# Patient Record
Sex: FEMALE | Race: OTHER | HISPANIC OR LATINO | URBAN - METROPOLITAN AREA
[De-identification: names, ages, dates, MRNs, and addresses within clinical notes are randomized per-mention and may not be internally consistent; named-entity substitution may affect disease eponyms.]

---

## 2017-01-04 ENCOUNTER — EMERGENCY (EMERGENCY)
Facility: HOSPITAL | Age: 42
LOS: 1 days | Discharge: HOME | End: 2017-01-04
Attending: EMERGENCY MEDICINE
Payer: MEDICAID

## 2017-01-04 VITALS
DIASTOLIC BLOOD PRESSURE: 70 MMHG | WEIGHT: 173.06 LBS | HEIGHT: 63 IN | OXYGEN SATURATION: 100 % | HEART RATE: 85 BPM | SYSTOLIC BLOOD PRESSURE: 95 MMHG | RESPIRATION RATE: 20 BRPM | TEMPERATURE: 99 F

## 2017-01-04 DIAGNOSIS — Z90.721 ACQUIRED ABSENCE OF OVARIES, UNILATERAL: Chronic | ICD-10-CM

## 2017-01-04 DIAGNOSIS — R20.0 ANESTHESIA OF SKIN: ICD-10-CM

## 2017-01-04 DIAGNOSIS — M54.5 LOW BACK PAIN: ICD-10-CM

## 2017-01-04 DIAGNOSIS — J45.909 UNSPECIFIED ASTHMA, UNCOMPLICATED: ICD-10-CM

## 2017-01-04 PROCEDURE — 99282 EMERGENCY DEPT VISIT SF MDM: CPT

## 2017-01-04 PROCEDURE — 99283 EMERGENCY DEPT VISIT LOW MDM: CPT

## 2017-01-04 NOTE — ED STATDOCS - OBJECTIVE STATEMENT
42 y/o F pt with hx of herniated disc  presents to ED c/o back pain radiating to her legs. Pt states b/l leg tingling. Pt was given a shot in her back by Dr. Stark today and now pain has worsen. No saddle anesthesia, no urinary symptoms. No further complaints at this time.

## 2017-01-04 NOTE — ED ADULT TRIAGE NOTE - CHIEF COMPLAINT QUOTE
pt reports back pain, given "needle" by doctor 5:30pm, now with pain radiating down legs and causing difficulty walking. unsure of name of injection.

## 2017-01-04 NOTE — ED STATDOCS - NS ED MD SCRIBE ATTENDING SCRIBE SECTIONS
DISPOSITION/HIV/HISTORY OF PRESENT ILLNESS/VITAL SIGNS( Pullset)/REVIEW OF SYSTEMS/PHYSICAL EXAM/PAST MEDICAL/SURGICAL/SOCIAL HISTORY

## 2017-03-02 ENCOUNTER — RESULT REVIEW (OUTPATIENT)
Age: 42
End: 2017-03-02

## 2017-03-03 ENCOUNTER — OUTPATIENT (OUTPATIENT)
Dept: OUTPATIENT SERVICES | Facility: HOSPITAL | Age: 42
LOS: 1 days | End: 2017-03-03
Payer: MEDICAID

## 2017-03-03 DIAGNOSIS — D34 BENIGN NEOPLASM OF THYROID GLAND: ICD-10-CM

## 2017-03-03 DIAGNOSIS — Z90.721 ACQUIRED ABSENCE OF OVARIES, UNILATERAL: Chronic | ICD-10-CM

## 2017-03-03 LAB — OBSTETRICS AND GYNECOLOGY HOSPITAL CONSULT NOTE: SIGNIFICANT CHANGE UP

## 2017-03-03 PROCEDURE — 88173 CYTOPATH EVAL FNA REPORT: CPT | Mod: 26

## 2017-03-03 PROCEDURE — 88173 CYTOPATH EVAL FNA REPORT: CPT

## 2017-03-09 ENCOUNTER — OUTPATIENT (OUTPATIENT)
Dept: OUTPATIENT SERVICES | Facility: HOSPITAL | Age: 42
LOS: 1 days | End: 2017-03-09
Payer: MEDICAID

## 2017-03-09 VITALS
RESPIRATION RATE: 16 BRPM | TEMPERATURE: 99 F | DIASTOLIC BLOOD PRESSURE: 60 MMHG | HEART RATE: 64 BPM | SYSTOLIC BLOOD PRESSURE: 100 MMHG | WEIGHT: 165.35 LBS | HEIGHT: 63 IN

## 2017-03-09 DIAGNOSIS — Z90.721 ACQUIRED ABSENCE OF OVARIES, UNILATERAL: Chronic | ICD-10-CM

## 2017-03-09 DIAGNOSIS — E04.1 NONTOXIC SINGLE THYROID NODULE: ICD-10-CM

## 2017-03-09 DIAGNOSIS — Z30.8 ENCOUNTER FOR OTHER CONTRACEPTIVE MANAGEMENT: Chronic | ICD-10-CM

## 2017-03-09 DIAGNOSIS — Z01.818 ENCOUNTER FOR OTHER PREPROCEDURAL EXAMINATION: ICD-10-CM

## 2017-03-09 DIAGNOSIS — Z90.89 ACQUIRED ABSENCE OF OTHER ORGANS: Chronic | ICD-10-CM

## 2017-03-09 LAB
ALBUMIN SERPL ELPH-MCNC: 4.3 G/DL — SIGNIFICANT CHANGE UP (ref 3.3–5.2)
ALP SERPL-CCNC: 84 U/L — SIGNIFICANT CHANGE UP (ref 40–120)
ALT FLD-CCNC: 7 U/L — SIGNIFICANT CHANGE UP
ANION GAP SERPL CALC-SCNC: 16 MMOL/L — SIGNIFICANT CHANGE UP (ref 5–17)
APPEARANCE UR: CLEAR — SIGNIFICANT CHANGE UP
APTT BLD: 33.2 SEC — SIGNIFICANT CHANGE UP (ref 27.5–37.4)
AST SERPL-CCNC: 15 U/L — SIGNIFICANT CHANGE UP
BACTERIA # UR AUTO: ABNORMAL
BASOPHILS # BLD AUTO: 0 K/UL — SIGNIFICANT CHANGE UP (ref 0–0.2)
BASOPHILS NFR BLD AUTO: 0.2 % — SIGNIFICANT CHANGE UP (ref 0–2)
BILIRUB SERPL-MCNC: 0.2 MG/DL — LOW (ref 0.4–2)
BILIRUB UR-MCNC: NEGATIVE — SIGNIFICANT CHANGE UP
BLD GP AB SCN SERPL QL: SIGNIFICANT CHANGE UP
BUN SERPL-MCNC: 10 MG/DL — SIGNIFICANT CHANGE UP (ref 8–20)
CALCIUM SERPL-MCNC: 9.4 MG/DL — SIGNIFICANT CHANGE UP (ref 8.6–10.2)
CHLORIDE SERPL-SCNC: 103 MMOL/L — SIGNIFICANT CHANGE UP (ref 98–107)
CO2 SERPL-SCNC: 23 MMOL/L — SIGNIFICANT CHANGE UP (ref 22–29)
COLOR SPEC: YELLOW — SIGNIFICANT CHANGE UP
CREAT SERPL-MCNC: 0.71 MG/DL — SIGNIFICANT CHANGE UP (ref 0.5–1.3)
DIFF PNL FLD: ABNORMAL
EPI CELLS # UR: SIGNIFICANT CHANGE UP
GLUCOSE SERPL-MCNC: 94 MG/DL — SIGNIFICANT CHANGE UP (ref 70–115)
GLUCOSE UR QL: NEGATIVE MG/DL — SIGNIFICANT CHANGE UP
HCT VFR BLD CALC: 39.2 % — SIGNIFICANT CHANGE UP (ref 37–47)
HGB BLD-MCNC: 13.5 G/DL — SIGNIFICANT CHANGE UP (ref 12–16)
INR BLD: 1.01 RATIO — SIGNIFICANT CHANGE UP (ref 0.88–1.16)
KETONES UR-MCNC: NEGATIVE — SIGNIFICANT CHANGE UP
LEUKOCYTE ESTERASE UR-ACNC: ABNORMAL
LYMPHOCYTES # BLD AUTO: 2.9 K/UL — SIGNIFICANT CHANGE UP (ref 1–4.8)
LYMPHOCYTES # BLD AUTO: 44.8 % — SIGNIFICANT CHANGE UP (ref 20–55)
MCHC RBC-ENTMCNC: 33.1 PG — HIGH (ref 27–31)
MCHC RBC-ENTMCNC: 34.4 G/DL — SIGNIFICANT CHANGE UP (ref 32–36)
MCV RBC AUTO: 96.1 FL — SIGNIFICANT CHANGE UP (ref 81–99)
MONOCYTES # BLD AUTO: 0.3 K/UL — SIGNIFICANT CHANGE UP (ref 0–0.8)
MONOCYTES NFR BLD AUTO: 5.1 % — SIGNIFICANT CHANGE UP (ref 3–10)
NEUTROPHILS # BLD AUTO: 3.2 K/UL — SIGNIFICANT CHANGE UP (ref 1.8–8)
NEUTROPHILS NFR BLD AUTO: 49.7 % — SIGNIFICANT CHANGE UP (ref 37–73)
NITRITE UR-MCNC: NEGATIVE — SIGNIFICANT CHANGE UP
PH UR: 5 — SIGNIFICANT CHANGE UP (ref 4.8–8)
PLATELET # BLD AUTO: 290 K/UL — SIGNIFICANT CHANGE UP (ref 150–400)
POTASSIUM SERPL-MCNC: 4.2 MMOL/L — SIGNIFICANT CHANGE UP (ref 3.5–5.3)
POTASSIUM SERPL-SCNC: 4.2 MMOL/L — SIGNIFICANT CHANGE UP (ref 3.5–5.3)
PROT SERPL-MCNC: 7.6 G/DL — SIGNIFICANT CHANGE UP (ref 6.6–8.7)
PROT UR-MCNC: NEGATIVE MG/DL — SIGNIFICANT CHANGE UP
PROTHROM AB SERPL-ACNC: 11.1 SEC — SIGNIFICANT CHANGE UP (ref 10–13.1)
RBC # BLD: 4.08 M/UL — LOW (ref 4.4–5.2)
RBC # FLD: 13.3 % — SIGNIFICANT CHANGE UP (ref 11–15.6)
RBC CASTS # UR COMP ASSIST: SIGNIFICANT CHANGE UP /HPF (ref 0–4)
SODIUM SERPL-SCNC: 142 MMOL/L — SIGNIFICANT CHANGE UP (ref 135–145)
SP GR SPEC: 1.01 — SIGNIFICANT CHANGE UP (ref 1.01–1.02)
TYPE + AB SCN PNL BLD: SIGNIFICANT CHANGE UP
UROBILINOGEN FLD QL: NEGATIVE MG/DL — SIGNIFICANT CHANGE UP
WBC # BLD: 6.4 K/UL — SIGNIFICANT CHANGE UP (ref 4.8–10.8)
WBC # FLD AUTO: 6.4 K/UL — SIGNIFICANT CHANGE UP (ref 4.8–10.8)

## 2017-03-09 PROCEDURE — 87086 URINE CULTURE/COLONY COUNT: CPT

## 2017-03-09 PROCEDURE — 85730 THROMBOPLASTIN TIME PARTIAL: CPT

## 2017-03-09 PROCEDURE — 93010 ELECTROCARDIOGRAM REPORT: CPT

## 2017-03-09 PROCEDURE — 71046 X-RAY EXAM CHEST 2 VIEWS: CPT

## 2017-03-09 PROCEDURE — 80053 COMPREHEN METABOLIC PANEL: CPT

## 2017-03-09 PROCEDURE — 86900 BLOOD TYPING SEROLOGIC ABO: CPT

## 2017-03-09 PROCEDURE — 81001 URINALYSIS AUTO W/SCOPE: CPT

## 2017-03-09 PROCEDURE — 93005 ELECTROCARDIOGRAM TRACING: CPT

## 2017-03-09 PROCEDURE — 86850 RBC ANTIBODY SCREEN: CPT

## 2017-03-09 PROCEDURE — 85027 COMPLETE CBC AUTOMATED: CPT

## 2017-03-09 PROCEDURE — 86901 BLOOD TYPING SEROLOGIC RH(D): CPT

## 2017-03-09 PROCEDURE — 71020: CPT | Mod: 26

## 2017-03-09 PROCEDURE — 85610 PROTHROMBIN TIME: CPT

## 2017-03-09 RX ORDER — SODIUM CHLORIDE 9 MG/ML
3 INJECTION INTRAMUSCULAR; INTRAVENOUS; SUBCUTANEOUS ONCE
Qty: 0 | Refills: 0 | Status: DISCONTINUED | OUTPATIENT
Start: 2017-03-28 | End: 2017-03-28

## 2017-03-09 RX ORDER — SODIUM CHLORIDE 9 MG/ML
1000 INJECTION, SOLUTION INTRAVENOUS
Qty: 0 | Refills: 0 | Status: DISCONTINUED | OUTPATIENT
Start: 2017-03-28 | End: 2017-03-28

## 2017-03-09 NOTE — H&P PST ADULT - PMH
x 6  Asthma    Bipolar 1 disorder, depressed    Ectopic pregnancy  x 3  GERD (gastroesophageal reflux disease)    History of heart murmur in childhood    Lupus    Palpitations    Thyroid nodule  2016

## 2017-03-09 NOTE — H&P PST ADULT - NEGATIVE PSYCHIATRIC SYMPTOMS
no agitation/no visual hallucinations/no mood swings/no insomnia/no memory loss/no paranoia/no suicidal ideation

## 2017-03-09 NOTE — H&P PST ADULT - NEGATIVE FEMALE-SPECIFIC SYMPTOMS
no abnormal vaginal bleeding/no vaginal discharge/no amenorrhea/no pelvic pain/no dysmenorrhea/no spotting/no menorrhagia/no irregular menses

## 2017-03-09 NOTE — H&P PST ADULT - NEGATIVE SKIN SYMPTOMS
no brittle nails/no rash/no change in size/color of mole/no pitted nails/no dryness/no tumor/no itching/no hair loss

## 2017-03-09 NOTE — H&P PST ADULT - NEGATIVE ENMT SYMPTOMS
no dysphagia/no recurrent cold sores/no ear pain/no vertigo/no abnormal taste sensation/no gum bleeding/no sinus symptoms/no nasal obstruction/no post-nasal discharge/no nasal congestion/no tinnitus/no dry mouth/no nasal discharge/no nose bleeds/no throat pain/no hearing difficulty

## 2017-03-09 NOTE — H&P PST ADULT - NEGATIVE OPHTHALMOLOGIC SYMPTOMS
no diplopia/no scleral injection R/no photophobia/no pain L/no blurred vision R/no discharge L/no pain R/no loss of vision R/no loss of vision L/no discharge R/no irritation L/no irritation R/no blurred vision L/no scleral injection L/no lacrimation R/no lacrimation L

## 2017-03-09 NOTE — H&P PST ADULT - NEGATIVE BREAST SYMPTOMS
no breast lump R/no breast lump L/no breast tenderness R/no nipple discharge R/no breast tenderness L/no nipple discharge L

## 2017-03-09 NOTE — H&P PST ADULT - NEGATIVE GENERAL SYMPTOMS
no weight loss/no malaise/no fever/no fatigue/no polydipsia/no weight gain/no polyuria/no polyphagia/no anorexia/no chills/no sweating

## 2017-03-09 NOTE — H&P PST ADULT - NEGATIVE NEUROLOGICAL SYMPTOMS
no syncope/no hemiparesis/no confusion/no tremors/no loss of sensation/no headache/no weakness/no vertigo/no loss of consciousness/no transient paralysis/no focal seizures/no paresthesias/no facial palsy/no difficulty walking/no generalized seizures

## 2017-03-09 NOTE — H&P PST ADULT - GASTROINTESTINAL DETAILS
no organomegaly/soft/no distention/no masses palpable/nontender/no guarding/no rebound tenderness/bowel sounds normal/no rigidity/no bruit

## 2017-03-09 NOTE — H&P PST ADULT - NSANTHOSAYNRD_GEN_A_CORE
No. RUPERTO screening performed.  STOP BANG Legend: 0-2 = LOW Risk; 3-4 = INTERMEDIATE Risk; 5-8 = HIGH Risk

## 2017-03-09 NOTE — H&P PST ADULT - PROBLEM SELECTOR PLAN 1
Right thyroid lobectomy with isthmusectomy frozen section possible total thyroidectomy possible modified radical neck dissection

## 2017-03-09 NOTE — H&P PST ADULT - MUSCULOSKELETAL
details… detailed exam ROM intact/normal strength/no joint warmth/no joint erythema/no calf tenderness/no joint swelling

## 2017-03-09 NOTE — H&P PST ADULT - FAMILY HISTORY
Father  Still living? No  Family history of cancer in father, Age at diagnosis: Age Unknown     Mother  Still living? Yes, Estimated age: 71-80  Family history of hypothyroidism, Age at diagnosis: Age Unknown

## 2017-03-09 NOTE — H&P PST ADULT - NEUROLOGICAL DETAILS
superficial reflexes intact/normal strength/cranial nerves intact/sensation intact/alert and oriented x 3/responds to verbal commands/no spontaneous movement/responds to pain/deep reflexes intact

## 2017-03-09 NOTE — H&P PST ADULT - NEGATIVE MUSCULOSKELETAL SYMPTOMS
no back pain/no arm pain R/no myalgia/no arthralgia/no arm pain L/no neck pain/no stiffness/no joint swelling/no muscle cramps/no muscle weakness/no leg pain L/no arthritis/no leg pain R

## 2017-03-09 NOTE — H&P PST ADULT - NEGATIVE CARDIOVASCULAR SYMPTOMS
no peripheral edema/no paroxysmal nocturnal dyspnea/no orthopnea/no palpitations/no claudication/no chest pain/no dyspnea on exertion

## 2017-03-09 NOTE — H&P PST ADULT - NEGATIVE GENERAL GENITOURINARY SYMPTOMS
no gas in urine/no flank pain L/no urine discoloration/no bladder infections/no renal colic/no nocturia/no dysuria/no incontinence/normal urinary frequency/no hematuria/no flank pain R/no urinary hesitancy

## 2017-03-09 NOTE — H&P PST ADULT - NEGATIVE GASTROINTESTINAL SYMPTOMS
no melena/no flatulence/no diarrhea/no abdominal pain/no hematochezia/no vomiting/no change in bowel habits/no constipation

## 2017-03-09 NOTE — H&P PST ADULT - HISTORY OF PRESENT ILLNESS
40 y/o female had ct chest to evaluate enlarged thyroid results showed thyroid nodules patient now presents for 40 y/o female had ct chest to evaluate enlarged thyroid results showed thyroid nodules patient now presents for Right thyroid lobectomy with isthmusectomy frozen section possible total thyroidectomy possible modified radical neck dissection

## 2017-03-09 NOTE — H&P PST ADULT - RS GEN PE MLT RESP DETAILS PC
no rales/airway patent/no intercostal retractions/breath sounds equal/respirations non-labored/no subcutaneous emphysema/good air movement/clear to auscultation bilaterally/no chest wall tenderness/no rhonchi/no wheezes

## 2017-03-10 DIAGNOSIS — Z01.818 ENCOUNTER FOR OTHER PREPROCEDURAL EXAMINATION: ICD-10-CM

## 2017-03-10 DIAGNOSIS — E02 SUBCLINICAL IODINE-DEFICIENCY HYPOTHYROIDISM: ICD-10-CM

## 2017-03-10 LAB
CULTURE RESULTS: SIGNIFICANT CHANGE UP
SPECIMEN SOURCE: SIGNIFICANT CHANGE UP

## 2017-03-27 ENCOUNTER — RESULT REVIEW (OUTPATIENT)
Age: 42
End: 2017-03-27

## 2017-03-28 ENCOUNTER — INPATIENT (INPATIENT)
Facility: HOSPITAL | Age: 42
LOS: 0 days | Discharge: HOME | DRG: 627 | End: 2017-03-29
Attending: SPECIALIST | Admitting: SPECIALIST
Payer: MEDICAID

## 2017-03-28 VITALS
HEIGHT: 63 IN | TEMPERATURE: 98 F | WEIGHT: 164.91 LBS | OXYGEN SATURATION: 100 % | SYSTOLIC BLOOD PRESSURE: 100 MMHG | HEART RATE: 77 BPM | DIASTOLIC BLOOD PRESSURE: 61 MMHG | RESPIRATION RATE: 16 BRPM

## 2017-03-28 DIAGNOSIS — Z90.89 ACQUIRED ABSENCE OF OTHER ORGANS: Chronic | ICD-10-CM

## 2017-03-28 DIAGNOSIS — E02 SUBCLINICAL IODINE-DEFICIENCY HYPOTHYROIDISM: ICD-10-CM

## 2017-03-28 DIAGNOSIS — Z90.721 ACQUIRED ABSENCE OF OVARIES, UNILATERAL: Chronic | ICD-10-CM

## 2017-03-28 DIAGNOSIS — Z30.8 ENCOUNTER FOR OTHER CONTRACEPTIVE MANAGEMENT: Chronic | ICD-10-CM

## 2017-03-28 DIAGNOSIS — E04.1 NONTOXIC SINGLE THYROID NODULE: ICD-10-CM

## 2017-03-28 RX ORDER — TRAZODONE HCL 50 MG
50 TABLET ORAL DAILY
Qty: 0 | Refills: 0 | Status: DISCONTINUED | OUTPATIENT
Start: 2017-03-28 | End: 2017-03-29

## 2017-03-28 RX ORDER — ONDANSETRON 8 MG/1
4 TABLET, FILM COATED ORAL ONCE
Qty: 0 | Refills: 0 | Status: DISCONTINUED | OUTPATIENT
Start: 2017-03-28 | End: 2017-03-28

## 2017-03-28 RX ORDER — MORPHINE SULFATE 50 MG/1
4 CAPSULE, EXTENDED RELEASE ORAL
Qty: 0 | Refills: 0 | Status: DISCONTINUED | OUTPATIENT
Start: 2017-03-28 | End: 2017-03-29

## 2017-03-28 RX ORDER — ARIPIPRAZOLE 15 MG/1
15 TABLET ORAL DAILY
Qty: 0 | Refills: 0 | Status: DISCONTINUED | OUTPATIENT
Start: 2017-03-28 | End: 2017-03-29

## 2017-03-28 RX ORDER — SODIUM CHLORIDE 9 MG/ML
3 INJECTION INTRAMUSCULAR; INTRAVENOUS; SUBCUTANEOUS EVERY 8 HOURS
Qty: 0 | Refills: 0 | Status: DISCONTINUED | OUTPATIENT
Start: 2017-03-28 | End: 2017-03-29

## 2017-03-28 RX ORDER — CEFAZOLIN SODIUM 1 G
2000 VIAL (EA) INJECTION ONCE
Qty: 0 | Refills: 0 | Status: COMPLETED | OUTPATIENT
Start: 2017-03-28 | End: 2017-03-28

## 2017-03-28 RX ORDER — HYDROMORPHONE HYDROCHLORIDE 2 MG/ML
0.5 INJECTION INTRAMUSCULAR; INTRAVENOUS; SUBCUTANEOUS
Qty: 0 | Refills: 0 | Status: DISCONTINUED | OUTPATIENT
Start: 2017-03-28 | End: 2017-03-28

## 2017-03-28 RX ORDER — QUETIAPINE FUMARATE 200 MG/1
100 TABLET, FILM COATED ORAL DAILY
Qty: 0 | Refills: 0 | Status: DISCONTINUED | OUTPATIENT
Start: 2017-03-28 | End: 2017-03-29

## 2017-03-28 RX ORDER — ACETAMINOPHEN 500 MG
650 TABLET ORAL EVERY 6 HOURS
Qty: 0 | Refills: 0 | Status: DISCONTINUED | OUTPATIENT
Start: 2017-03-28 | End: 2017-03-29

## 2017-03-28 RX ORDER — FENTANYL CITRATE 50 UG/ML
50 INJECTION INTRAVENOUS
Qty: 0 | Refills: 0 | Status: DISCONTINUED | OUTPATIENT
Start: 2017-03-28 | End: 2017-03-28

## 2017-03-28 RX ORDER — SODIUM CHLORIDE 9 MG/ML
1000 INJECTION, SOLUTION INTRAVENOUS
Qty: 0 | Refills: 0 | Status: DISCONTINUED | OUTPATIENT
Start: 2017-03-28 | End: 2017-03-28

## 2017-03-28 RX ORDER — DIVALPROEX SODIUM 500 MG/1
500 TABLET, DELAYED RELEASE ORAL DAILY
Qty: 0 | Refills: 0 | Status: DISCONTINUED | OUTPATIENT
Start: 2017-03-28 | End: 2017-03-29

## 2017-03-28 RX ORDER — ALBUTEROL 90 UG/1
2 AEROSOL, METERED ORAL EVERY 6 HOURS
Qty: 0 | Refills: 0 | Status: DISCONTINUED | OUTPATIENT
Start: 2017-03-28 | End: 2017-03-29

## 2017-03-28 RX ORDER — TRAZODONE HCL 50 MG
100 TABLET ORAL ONCE
Qty: 0 | Refills: 0 | Status: COMPLETED | OUTPATIENT
Start: 2017-03-28 | End: 2017-03-28

## 2017-03-28 RX ADMIN — FENTANYL CITRATE 50 MICROGRAM(S): 50 INJECTION INTRAVENOUS at 11:55

## 2017-03-28 RX ADMIN — QUETIAPINE FUMARATE 100 MILLIGRAM(S): 200 TABLET, FILM COATED ORAL at 15:59

## 2017-03-28 RX ADMIN — SODIUM CHLORIDE 3 MILLILITER(S): 9 INJECTION INTRAMUSCULAR; INTRAVENOUS; SUBCUTANEOUS at 20:56

## 2017-03-28 RX ADMIN — HYDROMORPHONE HYDROCHLORIDE 0.5 MILLIGRAM(S): 2 INJECTION INTRAMUSCULAR; INTRAVENOUS; SUBCUTANEOUS at 14:15

## 2017-03-28 RX ADMIN — FENTANYL CITRATE 50 MICROGRAM(S): 50 INJECTION INTRAVENOUS at 11:40

## 2017-03-28 RX ADMIN — HYDROMORPHONE HYDROCHLORIDE 0.5 MILLIGRAM(S): 2 INJECTION INTRAMUSCULAR; INTRAVENOUS; SUBCUTANEOUS at 12:55

## 2017-03-28 RX ADMIN — ALBUTEROL 2 PUFF(S): 90 AEROSOL, METERED ORAL at 20:44

## 2017-03-28 RX ADMIN — ARIPIPRAZOLE 15 MILLIGRAM(S): 15 TABLET ORAL at 15:57

## 2017-03-28 RX ADMIN — HYDROMORPHONE HYDROCHLORIDE 0.5 MILLIGRAM(S): 2 INJECTION INTRAMUSCULAR; INTRAVENOUS; SUBCUTANEOUS at 12:42

## 2017-03-28 RX ADMIN — DIVALPROEX SODIUM 500 MILLIGRAM(S): 500 TABLET, DELAYED RELEASE ORAL at 16:14

## 2017-03-28 RX ADMIN — MORPHINE SULFATE 4 MILLIGRAM(S): 50 CAPSULE, EXTENDED RELEASE ORAL at 17:55

## 2017-03-28 RX ADMIN — HYDROMORPHONE HYDROCHLORIDE 0.5 MILLIGRAM(S): 2 INJECTION INTRAMUSCULAR; INTRAVENOUS; SUBCUTANEOUS at 14:00

## 2017-03-28 RX ADMIN — FENTANYL CITRATE 50 MICROGRAM(S): 50 INJECTION INTRAVENOUS at 11:30

## 2017-03-28 RX ADMIN — MORPHINE SULFATE 4 MILLIGRAM(S): 50 CAPSULE, EXTENDED RELEASE ORAL at 18:55

## 2017-03-28 RX ADMIN — Medication 100 MILLIGRAM(S): at 23:13

## 2017-03-28 RX ADMIN — Medication 50 MILLIGRAM(S): at 15:59

## 2017-03-28 RX ADMIN — FENTANYL CITRATE 50 MICROGRAM(S): 50 INJECTION INTRAVENOUS at 11:45

## 2017-03-29 ENCOUNTER — TRANSCRIPTION ENCOUNTER (OUTPATIENT)
Age: 42
End: 2017-03-29

## 2017-03-29 VITALS
SYSTOLIC BLOOD PRESSURE: 111 MMHG | DIASTOLIC BLOOD PRESSURE: 74 MMHG | RESPIRATION RATE: 18 BRPM | OXYGEN SATURATION: 96 % | HEART RATE: 75 BPM | TEMPERATURE: 99 F

## 2017-03-29 LAB
ANION GAP SERPL CALC-SCNC: 9 MMOL/L — SIGNIFICANT CHANGE UP (ref 5–17)
BUN SERPL-MCNC: 7 MG/DL — LOW (ref 8–20)
CALCIUM SERPL-MCNC: 8.3 MG/DL — LOW (ref 8.6–10.2)
CHLORIDE SERPL-SCNC: 104 MMOL/L — SIGNIFICANT CHANGE UP (ref 98–107)
CO2 SERPL-SCNC: 26 MMOL/L — SIGNIFICANT CHANGE UP (ref 22–29)
CREAT SERPL-MCNC: 0.69 MG/DL — SIGNIFICANT CHANGE UP (ref 0.5–1.3)
GLUCOSE SERPL-MCNC: 101 MG/DL — SIGNIFICANT CHANGE UP (ref 70–115)
HCT VFR BLD CALC: 35 % — LOW (ref 37–47)
HGB BLD-MCNC: 12.1 G/DL — SIGNIFICANT CHANGE UP (ref 12–16)
MCHC RBC-ENTMCNC: 32.9 PG — HIGH (ref 27–31)
MCHC RBC-ENTMCNC: 34.6 G/DL — SIGNIFICANT CHANGE UP (ref 32–36)
MCV RBC AUTO: 95.1 FL — SIGNIFICANT CHANGE UP (ref 81–99)
PLATELET # BLD AUTO: 214 K/UL — SIGNIFICANT CHANGE UP (ref 150–400)
POTASSIUM SERPL-MCNC: 4.1 MMOL/L — SIGNIFICANT CHANGE UP (ref 3.5–5.3)
POTASSIUM SERPL-SCNC: 4.1 MMOL/L — SIGNIFICANT CHANGE UP (ref 3.5–5.3)
RBC # BLD: 3.68 M/UL — LOW (ref 4.4–5.2)
RBC # FLD: 13.4 % — SIGNIFICANT CHANGE UP (ref 11–15.6)
SODIUM SERPL-SCNC: 139 MMOL/L — SIGNIFICANT CHANGE UP (ref 135–145)
SURGICAL PATHOLOGY FINAL REPORT - CH: SIGNIFICANT CHANGE UP
WBC # BLD: 8.8 K/UL — SIGNIFICANT CHANGE UP (ref 4.8–10.8)
WBC # FLD AUTO: 8.8 K/UL — SIGNIFICANT CHANGE UP (ref 4.8–10.8)

## 2017-03-29 PROCEDURE — 83735 ASSAY OF MAGNESIUM: CPT

## 2017-03-29 PROCEDURE — 82040 ASSAY OF SERUM ALBUMIN: CPT

## 2017-03-29 PROCEDURE — 85027 COMPLETE CBC AUTOMATED: CPT

## 2017-03-29 PROCEDURE — 94640 AIRWAY INHALATION TREATMENT: CPT

## 2017-03-29 PROCEDURE — 88331 PATH CONSLTJ SURG 1 BLK 1SPC: CPT

## 2017-03-29 PROCEDURE — 88307 TISSUE EXAM BY PATHOLOGIST: CPT

## 2017-03-29 PROCEDURE — 36415 COLL VENOUS BLD VENIPUNCTURE: CPT

## 2017-03-29 PROCEDURE — 80048 BASIC METABOLIC PNL TOTAL CA: CPT

## 2017-03-29 RX ORDER — SODIUM CHLORIDE 9 MG/ML
1000 INJECTION, SOLUTION INTRAVENOUS
Qty: 0 | Refills: 0 | Status: DISCONTINUED | OUTPATIENT
Start: 2017-03-29 | End: 2017-03-29

## 2017-03-29 RX ORDER — OXYCODONE HYDROCHLORIDE 5 MG/1
1 TABLET ORAL
Qty: 24 | Refills: 0
Start: 2017-03-29 | End: 2017-04-02

## 2017-03-29 RX ORDER — SODIUM CHLORIDE 9 MG/ML
1000 INJECTION INTRAMUSCULAR; INTRAVENOUS; SUBCUTANEOUS ONCE
Qty: 0 | Refills: 0 | Status: COMPLETED | OUTPATIENT
Start: 2017-03-29 | End: 2017-03-29

## 2017-03-29 RX ADMIN — ARIPIPRAZOLE 15 MILLIGRAM(S): 15 TABLET ORAL at 13:03

## 2017-03-29 RX ADMIN — QUETIAPINE FUMARATE 100 MILLIGRAM(S): 200 TABLET, FILM COATED ORAL at 13:02

## 2017-03-29 RX ADMIN — SODIUM CHLORIDE 3 MILLILITER(S): 9 INJECTION INTRAMUSCULAR; INTRAVENOUS; SUBCUTANEOUS at 04:31

## 2017-03-29 RX ADMIN — DIVALPROEX SODIUM 500 MILLIGRAM(S): 500 TABLET, DELAYED RELEASE ORAL at 13:02

## 2017-03-29 RX ADMIN — SODIUM CHLORIDE 2000 MILLILITER(S): 9 INJECTION INTRAMUSCULAR; INTRAVENOUS; SUBCUTANEOUS at 09:46

## 2017-03-29 RX ADMIN — Medication 50 MILLIGRAM(S): at 13:03

## 2017-03-29 RX ADMIN — SODIUM CHLORIDE 3 MILLILITER(S): 9 INJECTION INTRAMUSCULAR; INTRAVENOUS; SUBCUTANEOUS at 13:03

## 2017-03-29 NOTE — DISCHARGE NOTE ADULT - CARE PLAN
Principal Discharge DX:	Thyroid nodule  Goal:	pain control, outpatient follow up, normal hormone regulation  Instructions for follow-up, activity and diet:	avoid strenuous activity   advance diet as tolerated   may shower only, prevent drain area from getting wet, if it does pat dry, do not let water directl;y hit incision area, do not remove steri strips , pat area dry   monitor and record drain outpatient every 8 hours , empty as needed   follow up dr hopkins one week Principal Discharge DX:	Thyroid nodule  Goal:	pain control, outpatient follow up  Instructions for follow-up, activity and diet:	avoid strenuous activity   advance diet as tolerated   may shower only, prevent drain area from getting wet, if it does pat dry, do not let water directl;y hit incision area, do not remove steri strips , pat area dry   monitor and record drain outpatient every 12 hours , empty as needed   follow up dr hopkins FRIDAY in office. call for appointment immediately

## 2017-03-29 NOTE — DISCHARGE NOTE ADULT - PLAN OF CARE
pain control, outpatient follow up, normal hormone regulation avoid strenuous activity   advance diet as tolerated   may shower only, prevent drain area from getting wet, if it does pat dry, do not let water directl;y hit incision area, do not remove steri strips , pat area dry   monitor and record drain outpatient every 8 hours , empty as needed   follow up dr hopkins one week pain control, outpatient follow up avoid strenuous activity   advance diet as tolerated   may shower only, prevent drain area from getting wet, if it does pat dry, do not let water directl;y hit incision area, do not remove steri strips , pat area dry   monitor and record drain outpatient every 12 hours , empty as needed   follow up dr hopkins FRIDAY in office. call for appointment immediately

## 2017-03-29 NOTE — DISCHARGE NOTE ADULT - MEDICATION SUMMARY - MEDICATIONS TO TAKE
I will START or STAY ON the medications listed below when I get home from the hospital:    acetaminophen-oxyCODONE 325 mg-5 mg oral tablet  -- 1 tab(s) by mouth every 4 hours, As needed, Moderate Pain MDD:6  -- Indication: For Moderate PAin     Depakote 500 mg oral delayed release tablet  -- 1000  by mouth once a day (at bedtime)  -- Indication: For Home med    traZODone 150 mg oral tablet  -- 1 tab(s) by mouth once a day (at bedtime)  -- Indication: For Home med    ARIPiprazole 15 mg oral tablet  -- 1 tab(s) by mouth once a day  -- Indication: For Home med     SEROquel 100 mg oral tablet  -- 1 tab(s) by mouth once a day  -- Indication: For HOme med     potassium iodide 1 g/mL oral solution  -- 5 drop(s) by mouth 3 times a day  -- Indication: For Home med     Ventolin HFA 90 mcg/inh inhalation aerosol  -- 2 puff(s) inhaled 4 times a day, As Needed  -- Indication: For Home med

## 2017-03-29 NOTE — DISCHARGE NOTE ADULT - CARE PROVIDER_API CALL
Abisai Eubanks), Surgery  [address]  [place] [address]  Phone: [phone]  Fax: [phone]

## 2017-03-29 NOTE — DISCHARGE NOTE ADULT - PATIENT PORTAL LINK FT
“You can access the FollowHealth Patient Portal, offered by [facility], by registering with the following website: [email or web address]”

## 2017-03-29 NOTE — DISCHARGE NOTE ADULT - HOSPITAL COURSE
42F s/p hemithryoidectomy on 3/28 no complicattions, admitted to surgical floor post op remained stable no acute events. KELIN output recorded post op. Given dvt ppx  During stay tolerating diet ambulating and voiding. pain well controlled on oral meds. no issues. Plan for d/c with outpatient follow up in officr with dr hopkins 42F s/p hemithryoidectomy on 3/28 no complicattions, admitted to surgical floor post op remained stable no acute events. KELIN output recorded post op. Given dvt ppx  During stay tolerating diet ambulating and voiding. pain well controlled on oral meds. no issues. Plan for d/c with outpatient follow up in officr with dr hopkins     Ca++ 8.3 this am  voice good  drain 55cc, will leave in place  fu kellie fri am

## 2017-06-22 ENCOUNTER — EMERGENCY (EMERGENCY)
Facility: HOSPITAL | Age: 42
LOS: 1 days | Discharge: LEFT BEFORE DISCHARGE | End: 2017-06-22
Attending: EMERGENCY MEDICINE

## 2017-06-22 VITALS
TEMPERATURE: 98 F | HEART RATE: 90 BPM | RESPIRATION RATE: 20 BRPM | HEIGHT: 66 IN | DIASTOLIC BLOOD PRESSURE: 77 MMHG | WEIGHT: 186.07 LBS | OXYGEN SATURATION: 99 % | SYSTOLIC BLOOD PRESSURE: 112 MMHG

## 2017-06-22 VITALS
RESPIRATION RATE: 20 BRPM | SYSTOLIC BLOOD PRESSURE: 112 MMHG | OXYGEN SATURATION: 100 % | TEMPERATURE: 98 F | DIASTOLIC BLOOD PRESSURE: 86 MMHG | HEART RATE: 86 BPM

## 2017-06-22 DIAGNOSIS — Z30.8 ENCOUNTER FOR OTHER CONTRACEPTIVE MANAGEMENT: Chronic | ICD-10-CM

## 2017-06-22 DIAGNOSIS — Z90.89 ACQUIRED ABSENCE OF OTHER ORGANS: Chronic | ICD-10-CM

## 2017-06-22 DIAGNOSIS — Z90.721 ACQUIRED ABSENCE OF OVARIES, UNILATERAL: Chronic | ICD-10-CM

## 2018-09-19 ENCOUNTER — TRANSCRIPTION ENCOUNTER (OUTPATIENT)
Age: 43
End: 2018-09-19

## 2019-01-11 ENCOUNTER — EMERGENCY (EMERGENCY)
Facility: HOSPITAL | Age: 44
LOS: 1 days | Discharge: HOME | End: 2019-01-11
Attending: EMERGENCY MEDICINE
Payer: MEDICAID

## 2019-01-11 VITALS
RESPIRATION RATE: 18 BRPM | HEIGHT: 63 IN | OXYGEN SATURATION: 96 % | SYSTOLIC BLOOD PRESSURE: 117 MMHG | WEIGHT: 209 LBS | HEART RATE: 89 BPM | DIASTOLIC BLOOD PRESSURE: 78 MMHG | TEMPERATURE: 98 F

## 2019-01-11 VITALS
TEMPERATURE: 98 F | OXYGEN SATURATION: 98 % | DIASTOLIC BLOOD PRESSURE: 65 MMHG | RESPIRATION RATE: 19 BRPM | SYSTOLIC BLOOD PRESSURE: 119 MMHG | HEART RATE: 80 BPM

## 2019-01-11 DIAGNOSIS — Z90.89 ACQUIRED ABSENCE OF OTHER ORGANS: Chronic | ICD-10-CM

## 2019-01-11 DIAGNOSIS — Z30.8 ENCOUNTER FOR OTHER CONTRACEPTIVE MANAGEMENT: Chronic | ICD-10-CM

## 2019-01-11 DIAGNOSIS — Z90.721 ACQUIRED ABSENCE OF OVARIES, UNILATERAL: Chronic | ICD-10-CM

## 2019-01-11 LAB
ALBUMIN SERPL ELPH-MCNC: 4.1 G/DL — SIGNIFICANT CHANGE UP (ref 3.3–5.2)
ALP SERPL-CCNC: 102 U/L — SIGNIFICANT CHANGE UP (ref 40–120)
ALT FLD-CCNC: 10 U/L — SIGNIFICANT CHANGE UP
ANION GAP SERPL CALC-SCNC: 12 MMOL/L — SIGNIFICANT CHANGE UP (ref 5–17)
APPEARANCE UR: CLEAR — SIGNIFICANT CHANGE UP
APTT BLD: 34.7 SEC — SIGNIFICANT CHANGE UP (ref 27.5–36.3)
AST SERPL-CCNC: 21 U/L — SIGNIFICANT CHANGE UP
BASOPHILS # BLD AUTO: 0 K/UL — SIGNIFICANT CHANGE UP (ref 0–0.2)
BASOPHILS NFR BLD AUTO: 0.2 % — SIGNIFICANT CHANGE UP (ref 0–2)
BILIRUB SERPL-MCNC: 0.2 MG/DL — LOW (ref 0.4–2)
BILIRUB UR-MCNC: NEGATIVE — SIGNIFICANT CHANGE UP
BUN SERPL-MCNC: 9 MG/DL — SIGNIFICANT CHANGE UP (ref 8–20)
CALCIUM SERPL-MCNC: 9 MG/DL — SIGNIFICANT CHANGE UP (ref 8.6–10.2)
CHLORIDE SERPL-SCNC: 104 MMOL/L — SIGNIFICANT CHANGE UP (ref 98–107)
CO2 SERPL-SCNC: 23 MMOL/L — SIGNIFICANT CHANGE UP (ref 22–29)
COLOR SPEC: YELLOW — SIGNIFICANT CHANGE UP
CREAT SERPL-MCNC: 0.59 MG/DL — SIGNIFICANT CHANGE UP (ref 0.5–1.3)
DIFF PNL FLD: ABNORMAL
EOSINOPHIL # BLD AUTO: 0.1 K/UL — SIGNIFICANT CHANGE UP (ref 0–0.5)
EOSINOPHIL NFR BLD AUTO: 1 % — SIGNIFICANT CHANGE UP (ref 0–6)
EPI CELLS # UR: SIGNIFICANT CHANGE UP
GLUCOSE SERPL-MCNC: 78 MG/DL — SIGNIFICANT CHANGE UP (ref 70–115)
GLUCOSE UR QL: NEGATIVE MG/DL — SIGNIFICANT CHANGE UP
HCG SERPL-ACNC: <4 MIU/ML — SIGNIFICANT CHANGE UP
HCG UR QL: NEGATIVE — SIGNIFICANT CHANGE UP
HCT VFR BLD CALC: 39.5 % — SIGNIFICANT CHANGE UP (ref 37–47)
HGB BLD-MCNC: 13.3 G/DL — SIGNIFICANT CHANGE UP (ref 12–16)
INR BLD: 0.93 RATIO — SIGNIFICANT CHANGE UP (ref 0.88–1.16)
KETONES UR-MCNC: NEGATIVE — SIGNIFICANT CHANGE UP
LEUKOCYTE ESTERASE UR-ACNC: NEGATIVE — SIGNIFICANT CHANGE UP
LIDOCAIN IGE QN: 34 U/L — SIGNIFICANT CHANGE UP (ref 22–51)
LYMPHOCYTES # BLD AUTO: 4.4 K/UL — SIGNIFICANT CHANGE UP (ref 1–4.8)
LYMPHOCYTES # BLD AUTO: 51.8 % — SIGNIFICANT CHANGE UP (ref 20–55)
MCHC RBC-ENTMCNC: 31 PG — SIGNIFICANT CHANGE UP (ref 27–31)
MCHC RBC-ENTMCNC: 33.7 G/DL — SIGNIFICANT CHANGE UP (ref 32–36)
MCV RBC AUTO: 92.1 FL — SIGNIFICANT CHANGE UP (ref 81–99)
MONOCYTES # BLD AUTO: 0.7 K/UL — SIGNIFICANT CHANGE UP (ref 0–0.8)
MONOCYTES NFR BLD AUTO: 7.8 % — SIGNIFICANT CHANGE UP (ref 3–10)
NEUTROPHILS # BLD AUTO: 3.3 K/UL — SIGNIFICANT CHANGE UP (ref 1.8–8)
NEUTROPHILS NFR BLD AUTO: 39.1 % — SIGNIFICANT CHANGE UP (ref 37–73)
NITRITE UR-MCNC: NEGATIVE — SIGNIFICANT CHANGE UP
OB PNL STL: NEGATIVE — SIGNIFICANT CHANGE UP
PH UR: 7 — SIGNIFICANT CHANGE UP (ref 5–8)
PLATELET # BLD AUTO: 289 K/UL — SIGNIFICANT CHANGE UP (ref 150–400)
POTASSIUM SERPL-MCNC: 4.1 MMOL/L — SIGNIFICANT CHANGE UP (ref 3.5–5.3)
POTASSIUM SERPL-SCNC: 4.1 MMOL/L — SIGNIFICANT CHANGE UP (ref 3.5–5.3)
PROT SERPL-MCNC: 7.3 G/DL — SIGNIFICANT CHANGE UP (ref 6.6–8.7)
PROT UR-MCNC: NEGATIVE MG/DL — SIGNIFICANT CHANGE UP
PROTHROM AB SERPL-ACNC: 10.7 SEC — SIGNIFICANT CHANGE UP (ref 10–12.9)
RBC # BLD: 4.29 M/UL — LOW (ref 4.4–5.2)
RBC # FLD: 13.1 % — SIGNIFICANT CHANGE UP (ref 11–15.6)
RBC CASTS # UR COMP ASSIST: SIGNIFICANT CHANGE UP /HPF (ref 0–4)
SODIUM SERPL-SCNC: 139 MMOL/L — SIGNIFICANT CHANGE UP (ref 135–145)
SP GR SPEC: 1.01 — SIGNIFICANT CHANGE UP (ref 1.01–1.02)
UROBILINOGEN FLD QL: NEGATIVE MG/DL — SIGNIFICANT CHANGE UP
WBC # BLD: 8.4 K/UL — SIGNIFICANT CHANGE UP (ref 4.8–10.8)
WBC # FLD AUTO: 8.4 K/UL — SIGNIFICANT CHANGE UP (ref 4.8–10.8)
WBC UR QL: SIGNIFICANT CHANGE UP

## 2019-01-11 PROCEDURE — 96374 THER/PROPH/DIAG INJ IV PUSH: CPT | Mod: XU

## 2019-01-11 PROCEDURE — 83690 ASSAY OF LIPASE: CPT

## 2019-01-11 PROCEDURE — 74177 CT ABD & PELVIS W/CONTRAST: CPT | Mod: 26

## 2019-01-11 PROCEDURE — 82272 OCCULT BLD FECES 1-3 TESTS: CPT

## 2019-01-11 PROCEDURE — 86900 BLOOD TYPING SEROLOGIC ABO: CPT

## 2019-01-11 PROCEDURE — 81001 URINALYSIS AUTO W/SCOPE: CPT

## 2019-01-11 PROCEDURE — 84702 CHORIONIC GONADOTROPIN TEST: CPT

## 2019-01-11 PROCEDURE — 81025 URINE PREGNANCY TEST: CPT

## 2019-01-11 PROCEDURE — 99284 EMERGENCY DEPT VISIT MOD MDM: CPT

## 2019-01-11 PROCEDURE — 85730 THROMBOPLASTIN TIME PARTIAL: CPT

## 2019-01-11 PROCEDURE — 86850 RBC ANTIBODY SCREEN: CPT

## 2019-01-11 PROCEDURE — 71046 X-RAY EXAM CHEST 2 VIEWS: CPT

## 2019-01-11 PROCEDURE — 80053 COMPREHEN METABOLIC PANEL: CPT

## 2019-01-11 PROCEDURE — 36415 COLL VENOUS BLD VENIPUNCTURE: CPT

## 2019-01-11 PROCEDURE — 74177 CT ABD & PELVIS W/CONTRAST: CPT

## 2019-01-11 PROCEDURE — 85610 PROTHROMBIN TIME: CPT

## 2019-01-11 PROCEDURE — 71046 X-RAY EXAM CHEST 2 VIEWS: CPT | Mod: 26

## 2019-01-11 PROCEDURE — 99284 EMERGENCY DEPT VISIT MOD MDM: CPT | Mod: 25

## 2019-01-11 PROCEDURE — 85027 COMPLETE CBC AUTOMATED: CPT

## 2019-01-11 PROCEDURE — 86901 BLOOD TYPING SEROLOGIC RH(D): CPT

## 2019-01-11 RX ORDER — SODIUM CHLORIDE 9 MG/ML
1000 INJECTION INTRAMUSCULAR; INTRAVENOUS; SUBCUTANEOUS
Qty: 0 | Refills: 0 | Status: DISCONTINUED | OUTPATIENT
Start: 2019-01-11 | End: 2019-01-16

## 2019-01-11 RX ORDER — ONDANSETRON 8 MG/1
1 TABLET, FILM COATED ORAL
Qty: 1 | Refills: 0
Start: 2019-01-11 | End: 2019-01-13

## 2019-01-11 RX ORDER — FAMOTIDINE 10 MG/ML
1 INJECTION INTRAVENOUS
Qty: 28 | Refills: 0
Start: 2019-01-11 | End: 2019-01-24

## 2019-01-11 RX ORDER — SODIUM CHLORIDE 9 MG/ML
1000 INJECTION INTRAMUSCULAR; INTRAVENOUS; SUBCUTANEOUS ONCE
Qty: 0 | Refills: 0 | Status: COMPLETED | OUTPATIENT
Start: 2019-01-11 | End: 2019-01-11

## 2019-01-11 RX ORDER — PANTOPRAZOLE SODIUM 20 MG/1
40 TABLET, DELAYED RELEASE ORAL ONCE
Qty: 0 | Refills: 0 | Status: COMPLETED | OUTPATIENT
Start: 2019-01-11 | End: 2019-01-11

## 2019-01-11 RX ADMIN — SODIUM CHLORIDE 1000 MILLILITER(S): 9 INJECTION INTRAMUSCULAR; INTRAVENOUS; SUBCUTANEOUS at 18:52

## 2019-01-11 RX ADMIN — PANTOPRAZOLE SODIUM 40 MILLIGRAM(S): 20 TABLET, DELAYED RELEASE ORAL at 18:52

## 2019-01-11 NOTE — ED STATDOCS - PROGRESS NOTE DETAILS
Patient is a 43 year old F with a PMHx of lupus, asthma, bipolar and depression/anxiety who presents to the ED complaining of rectal bleeding onset today.  Reports that "there was a lot of blood."  When she wiped, there was bright red blood on the tissue and in the bowl.  States that her lower abdomen and lower back have been cramping as well. Notes that she needed to push during her BM and her stool was not hard.  Patient states that she is not on blood thinners, but she does bleed excessively at times.  No history hemorrhoids, anemia or blood transfusions in the past.  No fever, vomiting or other medical complaints at this time. Focused eval, protocol orders entered. Pt to be moved to main ED for complete evaluation by another provider.

## 2019-01-11 NOTE — ED ADULT NURSE NOTE - OBJECTIVE STATEMENT
Pt presented to ED c/o lower abd pain since today + blood in the stool + feeling lightheaded and dizzy,

## 2019-01-11 NOTE — ED PROVIDER NOTE - PROGRESS NOTE DETAILS
Eboni PIERSON- PT REASSURE DOF NOS ERIOUS ILLNESS, ADVSIED TO STAY HYDRATED AND DISCHAREGD WITH PEPCID AND ZOFRAN

## 2019-01-11 NOTE — ED PROVIDER NOTE - NSFOLLOWUPINSTRUCTIONS_ED_ALL_ED_FT
1. TAKE MEDS AS PRESCRIBED  2. drink lot so fluids like Pedialyte, Gatorade, coconut water  3. eat soft and light foods, avoid spicy food, fried food  4. return promptly in c/o worsening symptoms  5. follow up with your PCP in 1 wk

## 2019-01-11 NOTE — ED ADULT NURSE NOTE - NSIMPLEMENTINTERV_GEN_ALL_ED
Implemented All Universal Safety Interventions:  [place] to call system. Call bell, personal items and telephone within reach. Instruct patient to call for assistance. Room bathroom lighting operational. Non-slip footwear when patient is off stretcher. Physically safe environment: no spills, clutter or unnecessary equipment. Stretcher in lowest position, wheels locked, appropriate side rails in place.

## 2019-01-11 NOTE — ED PROVIDER NOTE - MEDICAL DECISION MAKING DETAILS
plan to check labs, do ct abd to r/o colitis, given h/o lupus but not on meds, may be a manifestation of lupus

## 2019-01-12 PROBLEM — E04.1 NONTOXIC SINGLE THYROID NODULE: Chronic | Status: ACTIVE | Noted: 2017-03-09

## 2019-01-12 PROBLEM — K21.9 GASTRO-ESOPHAGEAL REFLUX DISEASE WITHOUT ESOPHAGITIS: Chronic | Status: ACTIVE | Noted: 2017-03-09

## 2019-01-12 PROBLEM — Z86.79 PERSONAL HISTORY OF OTHER DISEASES OF THE CIRCULATORY SYSTEM: Chronic | Status: ACTIVE | Noted: 2017-03-09

## 2019-01-12 PROBLEM — R00.2 PALPITATIONS: Chronic | Status: ACTIVE | Noted: 2017-03-09

## 2019-01-12 LAB
BLD GP AB SCN SERPL QL: SIGNIFICANT CHANGE UP
TYPE + AB SCN PNL BLD: SIGNIFICANT CHANGE UP

## 2019-06-17 ENCOUNTER — APPOINTMENT (OUTPATIENT)
Dept: NEUROLOGY | Facility: CLINIC | Age: 44
End: 2019-06-17
Payer: MEDICAID

## 2019-06-17 VITALS
WEIGHT: 186 LBS | DIASTOLIC BLOOD PRESSURE: 80 MMHG | HEIGHT: 63 IN | BODY MASS INDEX: 32.96 KG/M2 | SYSTOLIC BLOOD PRESSURE: 100 MMHG

## 2019-06-17 DIAGNOSIS — G56.01 CARPAL TUNNEL SYNDROME, RIGHT UPPER LIMB: ICD-10-CM

## 2019-06-17 DIAGNOSIS — Z86.39 PERSONAL HISTORY OF OTHER ENDOCRINE, NUTRITIONAL AND METABOLIC DISEASE: ICD-10-CM

## 2019-06-17 DIAGNOSIS — M54.16 RADICULOPATHY, LUMBAR REGION: ICD-10-CM

## 2019-06-17 PROCEDURE — 99204 OFFICE O/P NEW MOD 45 MIN: CPT

## 2019-06-17 NOTE — CONSULT LETTER
[Dear  ___] : Dear ~ADAMARIS, [Consult Letter:] : I had the pleasure of evaluating your patient, [unfilled]. [Please see my note below.] : Please see my note below. [Consult Closing:] : Thank you very much for allowing me to participate in the care of this patient.  If you have any questions, please do not hesitate to contact me. [FreeTextEntry3] : Carlos Ozuna M.D., Ph.D. DPN-N\par [facility]\par Neurology at [place]\par Medical Director of Stroke Services\par [facility]\par  [Sincerely,] : Sincerely,

## 2019-06-17 NOTE — DISCUSSION/SUMMARY
[FreeTextEntry1] : This is a 44-year-old woman with numbness mostly in the mornings and the right arm greater than left arm as well as in the right leg. I plan to do an EMG nerve conduction study to differentiate between radiculopathy and carpal tunnel syndrome in the upper extremity and to determine whether it radiculopathy in the lower extremity. Once I have these results O. call her with any further treatment plans it may be necessary.

## 2019-06-17 NOTE — PHYSICAL EXAM
[General Appearance - In No Acute Distress] : in no acute distress [General Appearance - Alert] : alert [General Appearance - Well Nourished] : well nourished [General Appearance - Well Developed] : well developed [Person] : oriented to person [Place] : oriented to place [Short Term Intact] : short term memory intact [Time] : oriented to time [Remote Intact] : remote memory intact [Span Intact] : the attention span was normal [Registration Intact] : recent registration memory intact [Concentration Intact] : normal concentrating ability [Visual Intact] : visual attention was ~T not ~L decreased [Naming Objects] : no difficulty naming common objects [Repeating Phrases] : no difficulty repeating a phrase [Comprehension] : comprehension intact [Fluency] : fluency intact [Past History] : adequate knowledge of personal past history [Current Events] : adequate knowledge of current events [Cranial Nerves Optic (II)] : visual acuity intact bilaterally,  visual fields full to confrontation, pupils equal round and reactive to light [Cranial Nerves Oculomotor (III)] : extraocular motion intact [Cranial Nerves Trigeminal (V)] : facial sensation intact symmetrically [Cranial Nerves Glossopharyngeal (IX)] : tongue and palate midline [Cranial Nerves Facial (VII)] : face symmetrical [Cranial Nerves Vestibulocochlear (VIII)] : hearing was intact bilaterally [Cranial Nerves Accessory (XI - Cranial And Spinal)] : head turning and shoulder shrug symmetric [Cranial Nerves Hypoglossal (XII)] : there was no tongue deviation with protrusion [Motor Strength] : muscle strength was normal in all four extremities [No Muscle Atrophy] : normal bulk in all four extremities [Paresis Pronator Drift Right-Sided] : no pronator drift on the right [Sensation Tactile Decrease] : light touch was intact [Paresis Pronator Drift Left-Sided] : no pronator drift on the left [Sensation Pain / Temperature Decrease] : pain and temperature was intact [Sensation Vibration Decrease] : vibration was intact [Proprioception] : proprioception was intact [Balance] : balance was intact [Coordination - Dysmetria Impaired Finger-to-Nose Bilateral] : not present [Tremor] : no tremor present [2+] : Ankle jerk left 2+ [FreeTextEntry7] : Positive Phalen's sign right greater than left [Sclera] : the sclera and conjunctiva were normal [PERRL With Normal Accommodation] : pupils were equal in size, round, reactive to light, with normal accommodation [Extraocular Movements] : extraocular movements were intact [Optic Disc Abnormality] : the optic disc were normal in size and color [No APD] : no afferent pupillary defect [No SARAH] : no internuclear ophthalmoplegia [Full Visual Field] : full visual field [Edema] : there was no peripheral edema [Neck Appearance] : the appearance of the neck was normal [Abnormal Walk] : normal gait [Motor Tone] : muscle strength and tone were normal [Involuntary Movements] : no involuntary movements were seen

## 2019-06-17 NOTE — HISTORY OF PRESENT ILLNESS
[FreeTextEntry1] : Initial office visit June 17, 2019:\par This is a 44-year-old woman who presents today for evaluation of numbness. She states that upon awakening she gets numbness in the right upper extremity. It lasts for about 5-7 minutes. She'll also have tingling which takes a few additional minutes for her to stop. She can shake it out but it takes a while. She also has similar numbness in the right leg but it only lasts about 5 minutes or less. This is been going on for at least one year. There is nothing that makes it worse anxious time that makes it better. She states she does have a history of issues with her back. She is here today for neurologic evaluation.

## 2019-07-18 ENCOUNTER — APPOINTMENT (OUTPATIENT)
Dept: NEUROLOGY | Facility: CLINIC | Age: 44
End: 2019-07-18

## 2019-10-01 ENCOUNTER — OUTPATIENT (OUTPATIENT)
Dept: OUTPATIENT SERVICES | Facility: HOSPITAL | Age: 44
LOS: 1 days | End: 2019-10-01
Payer: MEDICAID

## 2019-10-01 DIAGNOSIS — Z90.89 ACQUIRED ABSENCE OF OTHER ORGANS: Chronic | ICD-10-CM

## 2019-10-01 DIAGNOSIS — Z30.8 ENCOUNTER FOR OTHER CONTRACEPTIVE MANAGEMENT: Chronic | ICD-10-CM

## 2019-10-01 DIAGNOSIS — Z90.721 ACQUIRED ABSENCE OF OVARIES, UNILATERAL: Chronic | ICD-10-CM

## 2019-10-01 PROCEDURE — G9001: CPT

## 2019-10-24 ENCOUNTER — EMERGENCY (EMERGENCY)
Facility: HOSPITAL | Age: 44
LOS: 1 days | Discharge: HOME | End: 2019-10-24
Attending: EMERGENCY MEDICINE
Payer: MEDICAID

## 2019-10-24 VITALS
HEIGHT: 63 IN | WEIGHT: 184.97 LBS | OXYGEN SATURATION: 97 % | SYSTOLIC BLOOD PRESSURE: 109 MMHG | HEART RATE: 70 BPM | TEMPERATURE: 98 F | DIASTOLIC BLOOD PRESSURE: 71 MMHG | RESPIRATION RATE: 18 BRPM

## 2019-10-24 DIAGNOSIS — Z90.89 ACQUIRED ABSENCE OF OTHER ORGANS: Chronic | ICD-10-CM

## 2019-10-24 DIAGNOSIS — Z30.8 ENCOUNTER FOR OTHER CONTRACEPTIVE MANAGEMENT: Chronic | ICD-10-CM

## 2019-10-24 DIAGNOSIS — Z90.721 ACQUIRED ABSENCE OF OVARIES, UNILATERAL: Chronic | ICD-10-CM

## 2019-10-24 PROCEDURE — 99283 EMERGENCY DEPT VISIT LOW MDM: CPT

## 2019-10-24 PROCEDURE — 73030 X-RAY EXAM OF SHOULDER: CPT

## 2019-10-24 PROCEDURE — 73030 X-RAY EXAM OF SHOULDER: CPT | Mod: 26,RT

## 2019-10-24 RX ORDER — LIDOCAINE 4 G/100G
1 CREAM TOPICAL ONCE
Refills: 0 | Status: COMPLETED | OUTPATIENT
Start: 2019-10-24 | End: 2019-10-24

## 2019-10-24 RX ORDER — DIAZEPAM 5 MG
5 TABLET ORAL ONCE
Refills: 0 | Status: DISCONTINUED | OUTPATIENT
Start: 2019-10-24 | End: 2019-10-24

## 2019-10-24 RX ORDER — LIDOCAINE 4 G/100G
1 CREAM TOPICAL
Qty: 30 | Refills: 0
Start: 2019-10-24 | End: 2019-11-22

## 2019-10-24 RX ORDER — ACETAMINOPHEN 500 MG
2 TABLET ORAL
Qty: 60 | Refills: 0
Start: 2019-10-24 | End: 2019-10-28

## 2019-10-24 RX ORDER — IBUPROFEN 200 MG
600 TABLET ORAL ONCE
Refills: 0 | Status: COMPLETED | OUTPATIENT
Start: 2019-10-24 | End: 2019-10-24

## 2019-10-24 RX ORDER — DEXAMETHASONE 0.5 MG/5ML
10 ELIXIR ORAL ONCE
Refills: 0 | Status: COMPLETED | OUTPATIENT
Start: 2019-10-24 | End: 2019-10-24

## 2019-10-24 RX ADMIN — Medication 600 MILLIGRAM(S): at 08:31

## 2019-10-24 RX ADMIN — Medication 10 MILLIGRAM(S): at 08:32

## 2019-10-24 RX ADMIN — Medication 5 MILLIGRAM(S): at 08:31

## 2019-10-24 RX ADMIN — LIDOCAINE 1 PATCH: 4 CREAM TOPICAL at 08:31

## 2019-10-24 NOTE — ED STATDOCS - ATTENDING CONTRIBUTION TO CARE
AJM; I performed the initial face to face bedside interview with this patient regarding history of present illness, review of symptoms and past medical, social and family history.  I completed an independent physical examination.  I was the initial provider who evaluated this patient.  The history, review of symptoms and examination was documented by the scribe in my presence and I attest to the accuracy of the documentation.  I have signed out the follow up of any pending tests (i.e. labs, radiological studies) to the ACP.  I have discussed the patient’s plan of care and disposition with the ACP

## 2019-10-24 NOTE — ED ADULT TRIAGE NOTE - CHIEF COMPLAINT QUOTE
patient states that she picked up her luggage and now has right shoulder and upper back pain, started 3 days a go

## 2019-10-24 NOTE — ED STATDOCS - OBJECTIVE STATEMENT
45y/o F with PMHx of Lupus and Asthma presents to the ED c/o sharp right shoulder pain radiating through neck and back, onset 3 days ago. Pt reports lifting heavy suitcase prior to sx onset. She reports that pain is obstructing sleep. Pt has tried Tylenol with Cyclobenzaprine without improvement.  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 Denies numbness, tingling, abdominal pain or CP. No additional complaints at this time.

## 2019-10-24 NOTE — ED STATDOCS - PATIENT PORTAL LINK FT
You can access the FollowMyHealth Patient Portal offered by [facility] by registering at the following website: [email or web address]. By joining [employer]’s FollowMyHealth portal, you will also be able to view your health information using other applications (apps) compatible with our system.

## 2019-10-24 NOTE — ED STATDOCS - CLINICAL SUMMARY MEDICAL DECISION MAKING FREE TEXT BOX
Pt p/w shoulder pain after lifting several days ago +muscle tenderness, full ROM of shoulder. Likely strain, will obtain Xray, treat sx with steroids and NSAID. Ortho f/u

## 2019-10-24 NOTE — ED STATDOCS - PROGRESS NOTE DETAILS
HPI and intake orders and PE reviewed, XR shows no FX, will TX as MSK with NSAIDS and muscle relaxers, f/u with ortho

## 2019-10-28 DIAGNOSIS — Z71.89 OTHER SPECIFIED COUNSELING: ICD-10-CM

## 2019-11-12 ENCOUNTER — APPOINTMENT (OUTPATIENT)
Dept: ORTHOPEDIC SURGERY | Facility: CLINIC | Age: 44
End: 2019-11-12
Payer: MEDICAID

## 2019-11-12 VITALS
DIASTOLIC BLOOD PRESSURE: 76 MMHG | HEART RATE: 98 BPM | HEIGHT: 63 IN | SYSTOLIC BLOOD PRESSURE: 110 MMHG | WEIGHT: 196 LBS | TEMPERATURE: 97.7 F | BODY MASS INDEX: 34.73 KG/M2

## 2019-11-12 DIAGNOSIS — Z87.39 PERSONAL HISTORY OF OTHER DISEASES OF THE MUSCULOSKELETAL SYSTEM AND CONNECTIVE TISSUE: ICD-10-CM

## 2019-11-12 DIAGNOSIS — M75.41 IMPINGEMENT SYNDROME OF RIGHT SHOULDER: ICD-10-CM

## 2019-11-12 DIAGNOSIS — M32.9 SYSTEMIC LUPUS ERYTHEMATOSUS, UNSPECIFIED: ICD-10-CM

## 2019-11-12 PROCEDURE — 73030 X-RAY EXAM OF SHOULDER: CPT | Mod: RT,TC

## 2019-11-12 PROCEDURE — 99203 OFFICE O/P NEW LOW 30 MIN: CPT | Mod: 25

## 2019-11-12 PROCEDURE — 20610 DRAIN/INJ JOINT/BURSA W/O US: CPT | Mod: RT

## 2019-11-13 PROBLEM — M75.41 IMPINGEMENT SYNDROME OF RIGHT SHOULDER: Status: ACTIVE | Noted: 2019-11-12

## 2019-11-13 PROBLEM — Z87.39 HISTORY OF HERNIATED INTERVERTEBRAL DISC: Status: RESOLVED | Noted: 2019-11-12 | Resolved: 2019-11-13

## 2019-11-13 PROBLEM — M32.9 LUPUS: Status: RESOLVED | Noted: 2019-11-12 | Resolved: 2019-11-13

## 2019-11-13 PROBLEM — Z87.39 HISTORY OF FIBROMYALGIA: Status: RESOLVED | Noted: 2019-11-12 | Resolved: 2019-11-13

## 2019-11-13 NOTE — REVIEW OF SYSTEMS
[Joint Pain] : joint pain [Joint Stiffness] : joint stiffness [Joint Swelling] : joint swelling [Headache] : headache [Feeling Tired] : fatigue [Constipation] : constipation [Muscle Weakness] : muscle weakness [Negative] : Psychiatric

## 2019-11-13 NOTE — PROCEDURE
[de-identified] : Consent: \par At this time, I have recommended an injection to the right shoulder.  The risks and benefits of the procedure were discussed with the patient in detail.  Upon verbal consent of the patient, we proceeded with the injection as noted below.  \par \par Procedure:  \par After a sterile prep, the patient underwent an injection of 9 cc of 1% Lidocaine without epinephrine and 1 cc of Kenalog into the right shoulder.  The patient tolerated the procedure well.  There were no complications.  \par

## 2019-11-13 NOTE — PHYSICAL EXAM
[Normal] : Gait: normal [de-identified] : . [de-identified] : Appearance:  Well-developed, well-nourished female in no acute distress.\par \par   [de-identified] : Right Shoulder:  \par Range of Motion in Degrees:	\par 	                                  Claimant:	Normal:	\par Abduction (Active)	                  180	               180 degrees	\par Abduction (Passive)	  180	               180 degrees	\par Forward elevation (Active):	  180	               180 degrees	\par Forward elevation (Passive):	  180	               180 degrees	\par External rotation (Active):	   45	               45 degrees	\par External rotation (Passive):	   45	               45 degrees	\par Internal rotation (Active):	   L-1	               L-1	\par Internal rotation (Passive):	   L-1	               L-1	\par  \par No motor weakness to internal rotation, external rotation or abduction in the scapular plane.  Negative crank test.  Negative O’Paul’s test.  Negative Speed’s test. Negative Yergason’s test.  Negative cross arm test.  No tenderness to palpation at the AC joint. Positive Hawkin’s sign.  Positive Neer’s sign. Negative apprehension. Negative sulcus sign.  No gross neurological or vascular deficits distally.  Skin is intact.  No rashes, scars or lesions. 2+ radial and ulnar pulses. No extra-articular swelling or tenderness.\par \par Left Shoulder: \par Range of Motion in Degrees:   	\par    	                        Claimant:  	Normal:  	\par Abduction (Active)  	180  	180 degrees  	\par Abduction (Passive)  	180  	180 degrees  	\par Forward elevation (Active):  	180  	180 degrees  	\par Forward elevation (Passive):  180  	180 degrees  	\par External rotation (Active):  	45  	45 degrees  	\par External rotation (Passive):  	45  	45 degrees  	\par Internal rotation (Active):  	L-1  	L-1  	\par Internal rotation (Passive):  	L-1  	L-1  \par 	\par No motor weakness to internal rotation, external rotation or abduction in the scapular plane.  Negative crank test.  Negative O’Paul’s test.  Negative Speed’s test. Negative Yergason’s test.  Negative cross arm test.  No tenderness to palpation at the AC joint. Negative Hawkin’s sign.  Negative Neer’s sign.  Negative apprehension. Negative sulcus sign.  No gross neurological or vascular deficits distally.  Skin is intact.  No rashes, scars or lesions. 2+ radial and ulnar pulses. No extra-articular swelling or tenderness. \par   [de-identified] : Radiographs, two views of the right shoulder, reveal no acute fractures or dislocations noted.

## 2019-11-13 NOTE — ADDENDUM
[FreeTextEntry1] : This note was written by Xenia Allen on 11/13/2019 acting as a scribe for DARLING NELSON

## 2019-11-13 NOTE — HISTORY OF PRESENT ILLNESS
[de-identified] : The patient comes in today with complaints of right shoulder pain, as well as numbness and tingling going down her right arm.  The patient states the pain is constant and radiating.  The patient describes the pain as sharp, throbbing, cramping, shooting and stabbing.  The patient notes nothing at this point makes her symptoms better, while lying down, physical use of the arm, sitting and laying on the side (left or right) makes her symptoms worse.  The patient indicates pain is at a level of 8 on a pain scale of 0-10.\par \par  [(no relief)  0] : the relief from medicine is 0/10 (no relief) [1] : the relief from medicine is 1/10 [de-identified] : Gabapentin [10  (interferes completely)] : the ailment interference is10/10 (interferes completely) [] : No [de-identified] : Naproxen [de-identified] : Apap Cod #4 [de-identified] : Apap [de-identified] : Cyclobenzaprine

## 2019-11-13 NOTE — DISCUSSION/SUMMARY
[de-identified] : At this time, due to impingement syndrome of the right shoulder, the patient was advised to ice.  She will return to the office in two weeks.  She was also sent to a cervical spine specialist that will follow up with her for her neck.

## 2020-01-02 NOTE — BRIEF OPERATIVE NOTE - CONDITION POST OP
Test Reason : 

Blood Pressure : ***/*** mmHG

Vent. Rate : 070 BPM     Atrial Rate : 070 BPM

   P-R Int : 142 ms          QRS Dur : 076 ms

    QT Int : 406 ms       P-R-T Axes : 059 049 043 degrees

   QTc Int : 438 ms

 

NORMAL SINUS RHYTHM

NORMAL ECG

WHEN COMPARED WITH ECG OF 08-JAN-2019 22:30,

NO SIGNIFICANT CHANGE WAS FOUND

Confirmed by ANIL ZHAO MD ([id number]) on 1/2/2020 3:14:33 PM

 

Referred By:             Confirmed By:ANIL ZHAO MD
stable

## 2020-02-16 ENCOUNTER — TRANSCRIPTION ENCOUNTER (OUTPATIENT)
Age: 45
End: 2020-02-16

## 2020-08-31 ENCOUNTER — APPOINTMENT (OUTPATIENT)
Dept: RHEUMATOLOGY | Facility: CLINIC | Age: 45
End: 2020-08-31

## 2020-09-04 ENCOUNTER — APPOINTMENT (OUTPATIENT)
Dept: ORTHOPEDIC SURGERY | Facility: CLINIC | Age: 45
End: 2020-09-04

## 2020-09-11 ENCOUNTER — TRANSCRIPTION ENCOUNTER (OUTPATIENT)
Age: 45
End: 2020-09-11

## 2020-09-16 ENCOUNTER — APPOINTMENT (OUTPATIENT)
Dept: CARDIOLOGY | Facility: CLINIC | Age: 45
End: 2020-09-16

## 2020-10-02 ENCOUNTER — EMERGENCY (EMERGENCY)
Facility: HOSPITAL | Age: 45
LOS: 1 days | Discharge: HOME | End: 2020-10-02
Attending: EMERGENCY MEDICINE
Payer: COMMERCIAL

## 2020-10-02 VITALS
HEART RATE: 100 BPM | OXYGEN SATURATION: 100 % | TEMPERATURE: 98 F | SYSTOLIC BLOOD PRESSURE: 112 MMHG | RESPIRATION RATE: 16 BRPM | WEIGHT: 195.11 LBS | HEIGHT: 63 IN | DIASTOLIC BLOOD PRESSURE: 78 MMHG

## 2020-10-02 DIAGNOSIS — Z90.721 ACQUIRED ABSENCE OF OVARIES, UNILATERAL: Chronic | ICD-10-CM

## 2020-10-02 DIAGNOSIS — Z30.8 ENCOUNTER FOR OTHER CONTRACEPTIVE MANAGEMENT: Chronic | ICD-10-CM

## 2020-10-02 DIAGNOSIS — Z90.89 ACQUIRED ABSENCE OF OTHER ORGANS: Chronic | ICD-10-CM

## 2020-10-02 PROCEDURE — 99283 EMERGENCY DEPT VISIT LOW MDM: CPT

## 2020-10-02 PROCEDURE — 99284 EMERGENCY DEPT VISIT MOD MDM: CPT

## 2020-10-02 RX ORDER — IBUPROFEN 200 MG
1 TABLET ORAL
Qty: 30 | Refills: 0
Start: 2020-10-02

## 2020-10-02 RX ORDER — METHOCARBAMOL 500 MG/1
1 TABLET, FILM COATED ORAL
Qty: 20 | Refills: 0
Start: 2020-10-02 | End: 2020-10-06
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
## 2020-10-02 NOTE — ED STATDOCS - PHYSICAL EXAMINATION
GCS 15, NAD, no midline c/t/l spine tenderness, FROM c-spine, chest clear and equal BS wanda, heart reg, abd soft, FROM upper and lower extremities without localized bony tenderness.
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
## 2020-10-02 NOTE — ED STATDOCS - PATIENT PORTAL LINK FT
You can access the FollowMyHealth Patient Portal offered by [facility] by registering at the following website: [email or web address]. By joining [facility]’s FollowMyHealth portal, you will also be able to view your health information using other applications (apps) compatible with our system.

## 2020-10-02 NOTE — ED STATDOCS - MUSCULOSKELETAL NEGATIVE STATEMENT, MLM
(+)shoulder, neck, chest soreness. no back pain, no gout, no musculoskeletal pain, no neck pain, and no weakness.
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
## 2020-11-12 ENCOUNTER — APPOINTMENT (OUTPATIENT)
Dept: OBGYN | Facility: CLINIC | Age: 45
End: 2020-11-12
Payer: MEDICAID

## 2020-11-12 VITALS
HEIGHT: 63 IN | DIASTOLIC BLOOD PRESSURE: 82 MMHG | WEIGHT: 183.38 LBS | SYSTOLIC BLOOD PRESSURE: 128 MMHG | BODY MASS INDEX: 32.49 KG/M2

## 2020-11-12 DIAGNOSIS — B96.89 ACUTE VAGINITIS: ICD-10-CM

## 2020-11-12 DIAGNOSIS — Z86.2 PERSONAL HISTORY OF DISEASES OF THE BLOOD AND BLOOD-FORMING ORGANS AND CERTAIN DISORDERS INVOLVING THE IMMUNE MECHANISM: ICD-10-CM

## 2020-11-12 DIAGNOSIS — N76.0 ACUTE VAGINITIS: ICD-10-CM

## 2020-11-12 LAB
BILIRUB UR QL STRIP: NORMAL
CLARITY UR: CLEAR
COLLECTION METHOD: NORMAL
GLUCOSE UR-MCNC: NORMAL
HCG UR QL: 0.2 EU/DL
HGB UR QL STRIP.AUTO: NORMAL
KETONES UR-MCNC: NORMAL
LEUKOCYTE ESTERASE UR QL STRIP: NORMAL
NITRITE UR QL STRIP: NORMAL
PH UR STRIP: 5
PROT UR STRIP-MCNC: NORMAL
SP GR UR STRIP: 1.01

## 2020-11-12 PROCEDURE — 36415 COLL VENOUS BLD VENIPUNCTURE: CPT

## 2020-11-12 PROCEDURE — 99202 OFFICE O/P NEW SF 15 MIN: CPT

## 2020-11-12 PROCEDURE — 99072 ADDL SUPL MATRL&STAF TM PHE: CPT

## 2020-11-12 PROCEDURE — 81002 URINALYSIS NONAUTO W/O SCOPE: CPT
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
## 2020-11-12 NOTE — PHYSICAL EXAM
[Normal] : uterus [Discharge] : a  ~M vaginal discharge was present [Moderate] : moderate [Foul Smelling] : foul smelling [Rebecca] : yellow [Thin] : thin [No Bleeding] : there was no active vaginal bleeding [Uterine Adnexae] : were not tender and not enlarged

## 2020-11-15 DIAGNOSIS — R79.89 OTHER SPECIFIED ABNORMAL FINDINGS OF BLOOD CHEMISTRY: ICD-10-CM

## 2020-11-15 LAB
BASOPHILS # BLD AUTO: 0.04 K/UL
BASOPHILS NFR BLD AUTO: 0.5 %
C TRACH RRNA SPEC QL NAA+PROBE: NOT DETECTED
CANDIDA VAG CYTO: NOT DETECTED
EOSINOPHIL # BLD AUTO: 0.04 K/UL
EOSINOPHIL NFR BLD AUTO: 0.5 %
FSH SERPL-MCNC: 12.9 IU/L
G VAGINALIS+PREV SP MTYP VAG QL MICRO: DETECTED
HCG SERPL-MCNC: <1 MIU/ML
HCT VFR BLD CALC: 42.8 %
HGB BLD-MCNC: 13.2 G/DL
IMM GRANULOCYTES NFR BLD AUTO: 0.5 %
LYMPHOCYTES # BLD AUTO: 3.37 K/UL
LYMPHOCYTES NFR BLD AUTO: 43.9 %
MAN DIFF?: NORMAL
MCHC RBC-ENTMCNC: 30.8 GM/DL
MCHC RBC-ENTMCNC: 32 PG
MCV RBC AUTO: 103.9 FL
MONOCYTES # BLD AUTO: 0.5 K/UL
MONOCYTES NFR BLD AUTO: 6.5 %
N GONORRHOEA RRNA SPEC QL NAA+PROBE: NOT DETECTED
NEUTROPHILS # BLD AUTO: 3.68 K/UL
NEUTROPHILS NFR BLD AUTO: 48.1 %
PLATELET # BLD AUTO: 298 K/UL
RBC # BLD: 4.12 M/UL
RBC # FLD: 14.1 %
SOURCE AMPLIFICATION: NORMAL
T VAGINALIS VAG QL WET PREP: NOT DETECTED
TSH SERPL-ACNC: 5.78 UIU/ML
WBC # FLD AUTO: 7.67 K/UL

## 2020-11-16 ENCOUNTER — NON-APPOINTMENT (OUTPATIENT)
Age: 45
End: 2020-11-16

## 2020-11-16 LAB
T3FREE SERPL-MCNC: 3.11 PG/ML
T4 FREE SERPL-MCNC: 1.2 NG/DL

## 2020-11-18 ENCOUNTER — NON-APPOINTMENT (OUTPATIENT)
Age: 45
End: 2020-11-18

## 2020-12-01 ENCOUNTER — APPOINTMENT (OUTPATIENT)
Dept: OBGYN | Facility: CLINIC | Age: 45
End: 2020-12-01

## 2020-12-23 PROBLEM — N76.0 BACTERIAL VAGINOSIS: Status: RESOLVED | Noted: 2020-11-12 | Resolved: 2020-12-23

## 2021-08-15 ENCOUNTER — EMERGENCY (EMERGENCY)
Facility: HOSPITAL | Age: 46
LOS: 1 days | Discharge: HOME | End: 2021-08-15
Attending: EMERGENCY MEDICINE | Admitting: EMERGENCY MEDICINE
Payer: MEDICAID

## 2021-08-15 VITALS
TEMPERATURE: 98 F | RESPIRATION RATE: 26 BRPM | HEIGHT: 63 IN | SYSTOLIC BLOOD PRESSURE: 120 MMHG | OXYGEN SATURATION: 100 % | HEART RATE: 106 BPM | DIASTOLIC BLOOD PRESSURE: 83 MMHG | WEIGHT: 149.91 LBS

## 2021-08-15 DIAGNOSIS — Z90.721 ACQUIRED ABSENCE OF OVARIES, UNILATERAL: Chronic | ICD-10-CM

## 2021-08-15 DIAGNOSIS — F31.62 BIPOLAR DISORDER, CURRENT EPISODE MIXED, MODERATE: ICD-10-CM

## 2021-08-15 DIAGNOSIS — F41.9 ANXIETY DISORDER, UNSPECIFIED: ICD-10-CM

## 2021-08-15 DIAGNOSIS — F14.10 COCAINE ABUSE, UNCOMPLICATED: ICD-10-CM

## 2021-08-15 DIAGNOSIS — K21.9 GASTRO-ESOPHAGEAL REFLUX DISEASE WITHOUT ESOPHAGITIS: ICD-10-CM

## 2021-08-15 DIAGNOSIS — F12.10 CANNABIS ABUSE, UNCOMPLICATED: ICD-10-CM

## 2021-08-15 DIAGNOSIS — Z90.89 ACQUIRED ABSENCE OF OTHER ORGANS: Chronic | ICD-10-CM

## 2021-08-15 DIAGNOSIS — Z20.822 CONTACT WITH AND (SUSPECTED) EXPOSURE TO COVID-19: ICD-10-CM

## 2021-08-15 DIAGNOSIS — Z30.8 ENCOUNTER FOR OTHER CONTRACEPTIVE MANAGEMENT: Chronic | ICD-10-CM

## 2021-08-15 DIAGNOSIS — J45.909 UNSPECIFIED ASTHMA, UNCOMPLICATED: ICD-10-CM

## 2021-08-15 DIAGNOSIS — Z80.9 FAMILY HISTORY OF MALIGNANT NEOPLASM, UNSPECIFIED: ICD-10-CM

## 2021-08-15 DIAGNOSIS — L93.2 OTHER LOCAL LUPUS ERYTHEMATOSUS: ICD-10-CM

## 2021-08-15 DIAGNOSIS — G47.00 INSOMNIA, UNSPECIFIED: ICD-10-CM

## 2021-08-15 LAB
ALBUMIN SERPL ELPH-MCNC: 4.4 G/DL — SIGNIFICANT CHANGE UP (ref 3.3–5.2)
ALP SERPL-CCNC: 100 U/L — SIGNIFICANT CHANGE UP (ref 40–120)
ALT FLD-CCNC: 8 U/L — SIGNIFICANT CHANGE UP
ANION GAP SERPL CALC-SCNC: 17 MMOL/L — SIGNIFICANT CHANGE UP (ref 5–17)
ANION GAP SERPL CALC-SCNC: 17 MMOL/L — SIGNIFICANT CHANGE UP (ref 5–17)
APAP SERPL-MCNC: <3 UG/ML — LOW (ref 10–26)
AST SERPL-CCNC: 17 U/L — SIGNIFICANT CHANGE UP
BASOPHILS # BLD AUTO: 0.03 K/UL — SIGNIFICANT CHANGE UP (ref 0–0.2)
BASOPHILS NFR BLD AUTO: 0.3 % — SIGNIFICANT CHANGE UP (ref 0–2)
BILIRUB DIRECT SERPL-MCNC: 0.1 MG/DL — SIGNIFICANT CHANGE UP (ref 0–0.3)
BILIRUB INDIRECT FLD-MCNC: 0.5 MG/DL — SIGNIFICANT CHANGE UP (ref 0.2–1)
BILIRUB SERPL-MCNC: 0.6 MG/DL — SIGNIFICANT CHANGE UP (ref 0.4–2)
BUN SERPL-MCNC: 19.2 MG/DL — SIGNIFICANT CHANGE UP (ref 8–20)
BUN SERPL-MCNC: 19.6 MG/DL — SIGNIFICANT CHANGE UP (ref 8–20)
CALCIUM SERPL-MCNC: 10 MG/DL — SIGNIFICANT CHANGE UP (ref 8.6–10.2)
CALCIUM SERPL-MCNC: 9.8 MG/DL — SIGNIFICANT CHANGE UP (ref 8.6–10.2)
CHLORIDE SERPL-SCNC: 102 MMOL/L — SIGNIFICANT CHANGE UP (ref 98–107)
CHLORIDE SERPL-SCNC: 104 MMOL/L — SIGNIFICANT CHANGE UP (ref 98–107)
CO2 SERPL-SCNC: 17 MMOL/L — LOW (ref 22–29)
CO2 SERPL-SCNC: 18 MMOL/L — LOW (ref 22–29)
CREAT SERPL-MCNC: 0.84 MG/DL — SIGNIFICANT CHANGE UP (ref 0.5–1.3)
CREAT SERPL-MCNC: 0.91 MG/DL — SIGNIFICANT CHANGE UP (ref 0.5–1.3)
EOSINOPHIL # BLD AUTO: 0.07 K/UL — SIGNIFICANT CHANGE UP (ref 0–0.5)
EOSINOPHIL NFR BLD AUTO: 0.6 % — SIGNIFICANT CHANGE UP (ref 0–6)
ETHANOL SERPL-MCNC: <10 MG/DL — SIGNIFICANT CHANGE UP (ref 0–9)
GLUCOSE SERPL-MCNC: 88 MG/DL — SIGNIFICANT CHANGE UP (ref 70–99)
GLUCOSE SERPL-MCNC: 89 MG/DL — SIGNIFICANT CHANGE UP (ref 70–99)
HCG SERPL-ACNC: <4 MIU/ML — SIGNIFICANT CHANGE UP
HCT VFR BLD CALC: 40.6 % — SIGNIFICANT CHANGE UP (ref 34.5–45)
HGB BLD-MCNC: 14.5 G/DL — SIGNIFICANT CHANGE UP (ref 11.5–15.5)
IMM GRANULOCYTES NFR BLD AUTO: 0.3 % — SIGNIFICANT CHANGE UP (ref 0–1.5)
LYMPHOCYTES # BLD AUTO: 3.99 K/UL — HIGH (ref 1–3.3)
LYMPHOCYTES # BLD AUTO: 33.5 % — SIGNIFICANT CHANGE UP (ref 13–44)
MCHC RBC-ENTMCNC: 31.9 PG — SIGNIFICANT CHANGE UP (ref 27–34)
MCHC RBC-ENTMCNC: 35.7 GM/DL — SIGNIFICANT CHANGE UP (ref 32–36)
MCV RBC AUTO: 89.2 FL — SIGNIFICANT CHANGE UP (ref 80–100)
MONOCYTES # BLD AUTO: 1.24 K/UL — HIGH (ref 0–0.9)
MONOCYTES NFR BLD AUTO: 10.4 % — SIGNIFICANT CHANGE UP (ref 2–14)
NEUTROPHILS # BLD AUTO: 6.53 K/UL — SIGNIFICANT CHANGE UP (ref 1.8–7.4)
NEUTROPHILS NFR BLD AUTO: 54.9 % — SIGNIFICANT CHANGE UP (ref 43–77)
PLATELET # BLD AUTO: 332 K/UL — SIGNIFICANT CHANGE UP (ref 150–400)
POTASSIUM SERPL-MCNC: 3.8 MMOL/L — SIGNIFICANT CHANGE UP (ref 3.5–5.3)
POTASSIUM SERPL-MCNC: 3.8 MMOL/L — SIGNIFICANT CHANGE UP (ref 3.5–5.3)
POTASSIUM SERPL-SCNC: 3.8 MMOL/L — SIGNIFICANT CHANGE UP (ref 3.5–5.3)
POTASSIUM SERPL-SCNC: 3.8 MMOL/L — SIGNIFICANT CHANGE UP (ref 3.5–5.3)
PROT SERPL-MCNC: 7.7 G/DL — SIGNIFICANT CHANGE UP (ref 6.6–8.7)
RBC # BLD: 4.55 M/UL — SIGNIFICANT CHANGE UP (ref 3.8–5.2)
RBC # FLD: 12.4 % — SIGNIFICANT CHANGE UP (ref 10.3–14.5)
SALICYLATES SERPL-MCNC: <0.6 MG/DL — LOW (ref 10–20)
SARS-COV-2 RNA SPEC QL NAA+PROBE: SIGNIFICANT CHANGE UP
SODIUM SERPL-SCNC: 137 MMOL/L — SIGNIFICANT CHANGE UP (ref 135–145)
SODIUM SERPL-SCNC: 138 MMOL/L — SIGNIFICANT CHANGE UP (ref 135–145)
T3 SERPL-MCNC: 199 NG/DL — SIGNIFICANT CHANGE UP (ref 80–200)
T4 AB SER-ACNC: 9.8 UG/DL — SIGNIFICANT CHANGE UP (ref 4.5–12)
TSH SERPL-MCNC: 1.98 UIU/ML — SIGNIFICANT CHANGE UP (ref 0.27–4.2)
TSH SERPL-MCNC: 1.98 UIU/ML — SIGNIFICANT CHANGE UP (ref 0.27–4.2)
VALPROATE SERPL-MCNC: <3.7 UG/ML — LOW (ref 50–100)
WBC # BLD: 11.9 K/UL — HIGH (ref 3.8–10.5)
WBC # FLD AUTO: 11.9 K/UL — HIGH (ref 3.8–10.5)

## 2021-08-15 PROCEDURE — 93010 ELECTROCARDIOGRAM REPORT: CPT

## 2021-08-15 PROCEDURE — 99220: CPT

## 2021-08-15 PROCEDURE — 90791 PSYCH DIAGNOSTIC EVALUATION: CPT

## 2021-08-15 RX ORDER — HYDROXYZINE HCL 10 MG
25 TABLET ORAL EVERY 6 HOURS
Refills: 0 | Status: DISCONTINUED | OUTPATIENT
Start: 2021-08-15 | End: 2021-08-20

## 2021-08-15 RX ORDER — MIRTAZAPINE 45 MG/1
15 TABLET, ORALLY DISINTEGRATING ORAL AT BEDTIME
Refills: 0 | Status: DISCONTINUED | OUTPATIENT
Start: 2021-08-15 | End: 2021-08-20

## 2021-08-15 RX ADMIN — MIRTAZAPINE 15 MILLIGRAM(S): 45 TABLET, ORALLY DISINTEGRATING ORAL at 22:43

## 2021-08-15 NOTE — ED PROVIDER NOTE - CLINICAL SUMMARY MEDICAL DECISION MAKING FREE TEXT BOX
Pt is a 46 y.o. F hx Bipolar Type 1, Lupus, asthma, thyroid nodule s/p surgical removal, presenting with anxiety, tearful, depressed, tangential. Endorses stopping her depakote, denies SI/HI or hallucinations. Labs, ekg,  consultation.

## 2021-08-15 NOTE — ED CDU PROVIDER INITIAL DAY NOTE - MEDICAL DECISION MAKING DETAILS
Pt is a 46 y.o. F hx Bipolar Type 1, Lupus, asthma, thyroid nodule s/p surgical removal, presenting with anxiety, tearful, depressed, tangential. Endorses stopping her depakote, denies SI/HI or hallucinations. Labs, ekg,  consultation. Will require hold for reevaluation after restarting home meds and anxiolytics.

## 2021-08-15 NOTE — ED PROVIDER NOTE - NSICDXPASTSURGICALHX_GEN_ALL_CORE_FT
PAST SURGICAL HISTORY:  Encounter for post Essure sterilization check     H/O oophorectomy right    S/P tonsillectomy

## 2021-08-15 NOTE — ED BEHAVIORAL HEALTH ASSESSMENT NOTE - DESCRIPTION
Calm and cooperative    ICU Vital Signs Last 24 Hrs  T(C): 37.3 (15 Aug 2021 15:27), Max: 37.3 (15 Aug 2021 15:27)  T(F): 99.1 (15 Aug 2021 15:27), Max: 99.1 (15 Aug 2021 15:27)  HR: 90 (15 Aug 2021 15:27) (90 - 106)  BP: 102/73 (15 Aug 2021 15:27) (102/73 - 120/83)  BP(mean): --  ABP: --  ABP(mean): --  RR: 20 (15 Aug 2021 15:27) (20 - 26)  SpO2: 100% (15 Aug 2021 15:27) (100% - 100%) Denies See HPI

## 2021-08-15 NOTE — ED PROVIDER NOTE - OBJECTIVE STATEMENT
Pt is a 46 y.o. F hx Bipolar Type 1, Lupus, asthma, thyroid nodule s/p surgical removal, presenting with anxiety. The pt states she is anxious, depressed, feels as if no one is listening to her, feels as if everyone thinks she is crazy. Denies SI/HI, or hallucinations. States she has not used marijuana in a few days, does not use any other drugs. Tangential, difficult to obtain full history.

## 2021-08-15 NOTE — ED PROVIDER NOTE - ATTENDING CONTRIBUTION TO CARE
Eduardo: I performed a face to face evaluation of this patient and performed a full history and physical examination on the patient.  I agree with the resident's history, physical examination, and plan of the patient unless otherwise noted. My brief assessment is as follows: hx bipolar non compliant with ability and depakote, biba for panic attacks/feeling depressed today. repeating "no one loves" her and she doesn't get why. denies si/hi, no etoh, and reports thc use but none today. no other physical complaints. hx thyroid nodules removed but no other thyroid conditions/meds. non toxic, tearful, borderline tangential with repeating phrases pertaining to being depressed/upset. pulse ~100, ctab, rrr, abd benign, neuro intact. ekg, labs, medically cleared, for psych eval.

## 2021-08-15 NOTE — ED ADULT TRIAGE NOTE - CHIEF COMPLAINT QUOTE
pt arrives by ambulance with reports that daughter called secondary to pt having anxiety attack. pt arrives crying out repeating self; "my  doesn't love me, no body understands me, I need medicine, why, why why, this is my life, I give everything to everyone when I have nothing." pt reports hx anxiety and depression, has been off of Depakote and Abilify for years. denies SI/HI, denies drugs and alcohol.

## 2021-08-15 NOTE — ED BEHAVIORAL HEALTH ASSESSMENT NOTE - HPI (INCLUDE ILLNESS QUALITY, SEVERITY, DURATION, TIMING, CONTEXT, MODIFYING FACTORS, ASSOCIATED SIGNS AND SYMPTOMS)
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 With a past psychiatric history of Bipolar disorder, no past suicidal attempts, with a history of auditory hallucinations; history of marijuana use, no history of alcohol abuse or other illicit drug use. Brought in to the ED by EMS after daughter called 911 for alleged anxiety attack.     Chart reviewed; per collateral on [place], "patient arrives by ambulance with reports that daughter called secondary to pt having anxiety attack. pt arrives crying out repeating self; "my  doesn't love me, no body understands me, I need medicine, why, why why, this is my life, I give everything to everyone when I have nothing." pt reports hx anxiety and depression, has been off of Depakote and Abilify for years. denies SI/HI".     On assessment patient is calm and irritable; with c/o severe sleep disturbance. She states she has not slept for over 5 days. Patient states she has a psychiatric NP named Jennifer she has been seeing at [facility]. She states she spoke to her last week and that she asked her whether she was feeling like she needs to be back on medications since she has been off of them for a while. She states her psychiatric provider told her since she was functioning she did not need to go back on meds. Patients states she has not slept for over 5 days and feels she needs to go back on her meds. She reports a history of  Mirtazapine, Abilify and Depakote treatment. She states she has been working a lot more lately because of her boss she tried to help out by working the overnight shift. Patient states she "flipped out at her  yesterday because of not sleeping". Patient states she was feeling sad because her  is not listening to her and that she is taking care of everyone else and that she is not taking care of herself. Nonetheless, she denies current suicidal or homicidal ideations and denies current intent or plans to die, citing her family as protective factor.

## 2021-08-15 NOTE — ED PROVIDER NOTE - PROGRESS NOTE DETAILS
Dee Dee Harrison, Resident: Home meds restarted along with anxiolytics, pt will require hold with reevaluations, will continue being evaluated by .
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
## 2021-08-15 NOTE — ED PROVIDER NOTE - NSICDXPASTMEDICALHX_GEN_ALL_CORE_FT
PAST MEDICAL HISTORY:   x 6    Asthma     Bipolar 1 disorder, depressed     Ectopic pregnancy x 3    GERD (gastroesophageal reflux disease)     History of heart murmur in childhood     Lupus     Palpitations     Thyroid nodule 2016

## 2021-08-15 NOTE — ED ADULT NURSE REASSESSMENT NOTE - NSIMPLEMENTINTERV_GEN_ALL_ED
Implemented All Universal Safety Interventions:  [place] to call system. Call bell, personal items and telephone within reach. Instruct patient to call for assistance. Room bathroom lighting operational. Non-slip footwear when patient is off stretcher. Physically safe environment: no spills, clutter or unnecessary equipment. Stretcher in lowest position, wheels locked, appropriate side rails in place.

## 2021-08-15 NOTE — ED ADULT NURSE NOTE - NSIMPLEMENTINTERV_GEN_ALL_ED
Implemented All Universal Safety Interventions:  [place] to call system. Call bell, personal items and telephone within reach. Instruct patient to call for assistance. Room bathroom lighting operational. Non-slip footwear when patient is off stretcher. Physically safe environment: no spills, clutter or unnecessary equipment. Stretcher in lowest position, wheels locked, appropriate side rails in place.

## 2021-08-15 NOTE — ED PROVIDER NOTE - PHYSICAL EXAMINATION
General: moderate distress secondary to anxiety, tearful  Head:  NC, AT  Eyes: EOMI, no scleral icterus  Ears: no erythema/drainage  Nose: midline, no bleeding/drainage  Throat: MMM  Cardiac: rapid rate, regular rhythm, no m/r/g, no lower extremity edema  Respiratory: CTABL, no wheezes/rales/rhonchi, equal chest wall expansions, no use of accessory muscles, no retractions  Abdomen: soft, nondistended, nontender, no rebound tenderness, no guarding, nonperitonitic  MSK/Vascular: full ROM, soft compartments, warm extremities  Neuro: Alert and oriented x3, motor/sensory intact  Psych: anxious, depressed affect, flight of ideas, tangential

## 2021-08-15 NOTE — ED BEHAVIORAL HEALTH ASSESSMENT NOTE - SUMMARY
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 With a past psychiatric history of Bipolar disorder, no past suicidal attempts, with a history of auditory hallucinations; history of marijuana use, no history of alcohol abuse or other illicit drug use. Brought in to the ED by EMS after daughter called 911 for alleged anxiety attack.     Patient reports global insomnia and denies current suicidal or homicidal ideations; she denies current intent or plans to die, citing her family as protective factor. However, she is reporting acute anxiety and severe sleep disturbances and will need to stay in the ED overnight for reassessment tomorrow morning.    Plan:  1. Give Ativan 2 mg PO X1 for severe anxiety.  2. Start Remeron 15 mg PO HS for sleep.  3. Will consider restarting Depakote once liver panel is available for review.  4. Patient to be reassessed in AM for appropriate dispo.

## 2021-08-15 NOTE — ED BEHAVIORAL HEALTH ASSESSMENT NOTE - RISK ASSESSMENT
Low risk:  Protective factor: No current suicidal thoughts; no current psychosis.  Risk factor: Current sleep disturbances and acute anxiety. Moderate Acute Suicide Risk

## 2021-08-15 NOTE — ED ADULT NURSE NOTE - HPI (INCLUDE ILLNESS QUALITY, SEVERITY, DURATION, TIMING, CONTEXT, MODIFYING FACTORS, ASSOCIATED SIGNS AND SYMPTOMS)
Patient comes to -ED after being medically cleared in main ED. BIB ambulance after family called to report pt was having an "anxiety attack". Pt presents with pressured speech, rapid and needs prompts to speak more slowly. Pt reports she has not been sleeping for about 5 nights, as she has been working overnight shifts for her job. Pt states she was upset at her  for not "listening" and not "understanding" her. Pt states she became obsessed about this, and then the anxiety attack occurred. Pt states she hasn't taken her meds for three days, but told other staff she hasn't taken meds in years. Admits to h/o auditory hallucinations, though none recently. Also denied thoughts to harm self/others. Pt oriented to unit and placed in bed 1.

## 2021-08-16 VITALS
TEMPERATURE: 99 F | DIASTOLIC BLOOD PRESSURE: 75 MMHG | RESPIRATION RATE: 20 BRPM | SYSTOLIC BLOOD PRESSURE: 108 MMHG | OXYGEN SATURATION: 100 % | HEART RATE: 88 BPM

## 2021-08-16 LAB
AMPHET UR-MCNC: POSITIVE
APPEARANCE UR: ABNORMAL
BACTERIA # UR AUTO: ABNORMAL
BARBITURATES UR SCN-MCNC: NEGATIVE — SIGNIFICANT CHANGE UP
BENZODIAZ UR-MCNC: NEGATIVE — SIGNIFICANT CHANGE UP
BILIRUB UR-MCNC: NEGATIVE — SIGNIFICANT CHANGE UP
COCAINE METAB.OTHER UR-MCNC: POSITIVE
COLOR SPEC: YELLOW — SIGNIFICANT CHANGE UP
COMMENT - URINE: SIGNIFICANT CHANGE UP
COVID-19 SPIKE DOMAIN AB INTERP: NEGATIVE — SIGNIFICANT CHANGE UP
COVID-19 SPIKE DOMAIN ANTIBODY RESULT: 0.4 U/ML — SIGNIFICANT CHANGE UP
DIFF PNL FLD: ABNORMAL
EPI CELLS # UR: SIGNIFICANT CHANGE UP
GLUCOSE UR QL: NEGATIVE MG/DL — SIGNIFICANT CHANGE UP
HCG UR QL: NEGATIVE — SIGNIFICANT CHANGE UP
KETONES UR-MCNC: ABNORMAL
LEUKOCYTE ESTERASE UR-ACNC: ABNORMAL
METHADONE UR-MCNC: NEGATIVE — SIGNIFICANT CHANGE UP
NITRITE UR-MCNC: NEGATIVE — SIGNIFICANT CHANGE UP
OPIATES UR-MCNC: NEGATIVE — SIGNIFICANT CHANGE UP
PCP SPEC-MCNC: SIGNIFICANT CHANGE UP
PCP UR-MCNC: NEGATIVE — SIGNIFICANT CHANGE UP
PH UR: 5 — SIGNIFICANT CHANGE UP (ref 5–8)
PROT UR-MCNC: 30 MG/DL
RBC CASTS # UR COMP ASSIST: ABNORMAL /HPF (ref 0–4)
SARS-COV-2 IGG+IGM SERPL QL IA: 0.4 U/ML — SIGNIFICANT CHANGE UP
SARS-COV-2 IGG+IGM SERPL QL IA: NEGATIVE — SIGNIFICANT CHANGE UP
SP GR SPEC: 1.02 — SIGNIFICANT CHANGE UP (ref 1.01–1.02)
THC UR QL: POSITIVE
UROBILINOGEN FLD QL: NEGATIVE MG/DL — SIGNIFICANT CHANGE UP
WBC UR QL: ABNORMAL

## 2021-08-16 PROCEDURE — U0005: CPT

## 2021-08-16 PROCEDURE — 80307 DRUG TEST PRSMV CHEM ANLYZR: CPT

## 2021-08-16 PROCEDURE — 99217: CPT

## 2021-08-16 PROCEDURE — U0003: CPT

## 2021-08-16 PROCEDURE — 36415 COLL VENOUS BLD VENIPUNCTURE: CPT

## 2021-08-16 PROCEDURE — 84443 ASSAY THYROID STIM HORMONE: CPT

## 2021-08-16 PROCEDURE — 82248 BILIRUBIN DIRECT: CPT

## 2021-08-16 PROCEDURE — 84480 ASSAY TRIIODOTHYRONINE (T3): CPT

## 2021-08-16 PROCEDURE — 80164 ASSAY DIPROPYLACETIC ACD TOT: CPT

## 2021-08-16 PROCEDURE — 80053 COMPREHEN METABOLIC PANEL: CPT

## 2021-08-16 PROCEDURE — 84702 CHORIONIC GONADOTROPIN TEST: CPT

## 2021-08-16 PROCEDURE — 80048 BASIC METABOLIC PNL TOTAL CA: CPT

## 2021-08-16 PROCEDURE — 81025 URINE PREGNANCY TEST: CPT

## 2021-08-16 PROCEDURE — 84436 ASSAY OF TOTAL THYROXINE: CPT

## 2021-08-16 PROCEDURE — 86769 SARS-COV-2 COVID-19 ANTIBODY: CPT

## 2021-08-16 PROCEDURE — 99284 EMERGENCY DEPT VISIT MOD MDM: CPT

## 2021-08-16 PROCEDURE — 93005 ELECTROCARDIOGRAM TRACING: CPT

## 2021-08-16 PROCEDURE — 99213 OFFICE O/P EST LOW 20 MIN: CPT

## 2021-08-16 PROCEDURE — 85025 COMPLETE CBC W/AUTO DIFF WBC: CPT

## 2021-08-16 PROCEDURE — 81001 URINALYSIS AUTO W/SCOPE: CPT

## 2021-08-16 PROCEDURE — G0378: CPT

## 2021-08-16 NOTE — ED CDU PROVIDER DISPOSITION NOTE - PATIENT PORTAL LINK FT
You can access the FollowMyHealth Patient Portal offered by [facility] by registering at the following website: [email or web address]. By joining [employer]’s FollowMyHealth portal, you will also be able to view your health information using other applications (apps) compatible with our system.

## 2021-08-16 NOTE — ED ADULT NURSE REASSESSMENT NOTE - COMFORT CARE
darkened lights/po fluids offered
plan of care explained
ambulated to bathroom
meal provided/plan of care explained

## 2021-08-16 NOTE — ED ADULT NURSE REASSESSMENT NOTE - NS ED NURSE REASSESS COMMENT FT1
Patient ate 100% of her breakfast.  Patient mood is subdued.  Patient reports she contracted her therapist at [facility] and made an appointment for tomorrow.  Denies suicidal or homicidal ideations safety maintained.
awake talking on phone.  s/p small episode of watery stool. pt showering
pt has appeared to be sleeping,  awoke and requested medication to help her sleep. pt given Remeron 15mg po.
pt resting/sleeping and does not appear to be in any acute distress with unlabored respirations. pt has made no attempts to harm herself or others.
po fluids provided.  pt calm pleasant at this time returned to room
Patient has been assessed by NP, and she determined pt would need to be held overnight for reassessment due to pt's recent insomnia. Pt expressed she was not thinking clearly because she had not slept for 5 nights. Pt able to sleep when put in darkened room B-5. Appears to be resting/sleeping at this time and in no apparent distress/discomfort.
Reviewed discharge instructions with patient and verbalized understanding of same.  Patient provided a copy of discharge instructions.  Patient agrees to return to local ED or call 911 if symptoms worsen or thoughts to harm self or others develop.
Assumed care of patient at 0715.  Patient oriented to staff and educated bout plan of care.  No attempts to harm self or others and safety maintained.

## 2021-08-16 NOTE — ED BEHAVIORAL HEALTH NOTE - BEHAVIORAL HEALTH NOTE
PROGRESS NOTE: 21 @ 08:50  	  • Reason for Ongoing Consultation: 	anxiety / insomnia    ID: 47 y/o Female with HEALTH ISSUES - PROBLEM Dx:  Bipolar disorder, current episode mixed, moderate      INTERVAL DATA:   • Interval Chief Complaint: "I can think better now that I've slept"  • Interval History: Patient is a 47 y/o F with history of bipolar disorder who presented with anxiety and insomnia x 5 days. Patient reports she is supposed to take Depakote 500mg 2 tablets at bedtime and Remeron for sleep, prescribed by her psychiatrist CHINA Rodriguez at [facility]. Patient admits that she was due for blood work before being able to receive another prescription for Depakote, but kept putting it off due to work and because the lab was too crowded. States she spoke her her NP over the phone on Thursday, who told her that if she felt okay off the medication, she could try taking a break from it. States she had been completely off her medication for one week once her prescription ran out. States for the last week she has had a lot of energy, has been staying late and overnight at work, feeling like she "could do anything" and that she was "on top of the world". States thinking back now, she "might have been manic". States she became upset last week because her  was being very distant, which prompted her to have "an anxiety attack, I couldn't calm down." States she went to a friends house and had some edibles before going home (endorses she smokes marijuana daily), where she couldn't calm down and her daughter had to call 911. Patient feels like she "lost sense of time" over the last week. Is requesting a phone to call her NP Jennifer to set up an appointment and update her. Wishes to go back home, feels much better now that she has slept. Reports she has a new prescription for Remeron at her pharmacy that she has not picked up yet. Denies SI/HI, AH/VH, depressed mood, anxiety.    REVIEW OF SYSTEMS:   • Constitutional Symptoms	No complaints  • Eyes	No complaints  • Ears / Nose / Throat / Mouth	No complaints  • Cardiovascular	No complaints  • Respiratory	No complaints  • Gastrointestinal	No complaints  • Genitourinary	No complaints  • Musculoskeletal	No complaints  • Skin	No complaints  • Neurological	No complaints  • Psychiatric (see HPI)	See HPI  • Endocrine	No complaints  • Hematologic / Lymphatic	No complaints  • Allergic / Immunologic	No complaints    REVIEW OF VITALS/LABS/IMAGING/INVESTIGATIONS:   • Vital signs reviewed: Yes  • Vital Signs:	    T(C): 36.9 (21 @ 07:47), Max: 37.3 (08-15-21 @ 15:27)  HR: 63 (21 @ 07:47) (63 - 106)  BP: 120/72 (21 @ 07:47) (102/73 - 120/83)  RR: 20 (21 @ 07:47) (18 - 26)  SpO2: 100% (21 @ 07:47) (97% - 100%)    • Available labs reviewed: Yes  • Available Lab Results:                           14.5   11.90 )-----------( 332      ( 15 Aug 2021 16:18 )             40.6     08-15    137  |  102  |  19.2  ----------------------------<  89  3.8   |  18.0<L>  |  0.84    Ca    9.8      15 Aug 2021 16:19    TPro  7.7  /  Alb  4.4  /  TBili  0.6  /  DBili  0.1  /  AST  17  /  ALT  8   /  AlkPhos  100  08-15    LIVER FUNCTIONS - ( 15 Aug 2021 16:19 )  Alb: 4.4 g/dL / Pro: 7.7 g/dL / ALK PHOS: 100 U/L / ALT: 8 U/L / AST: 17 U/L / GGT: x             Urinalysis Basic - ( 16 Aug 2021 08:37 )    Color: Yellow / Appearance: Slightly Turbid / S.025 / pH: x  Gluc: x / Ketone: Trace  / Bili: Negative / Urobili: Negative mg/dL   Blood: x / Protein: 30 mg/dL / Nitrite: Negative   Leuk Esterase: Moderate / RBC: x / WBC x   Sq Epi: x / Non Sq Epi: x / Bacteria: x          MEDICATIONS:      PRN Medications:   • PRN Medications since last evaluation	yes  • PRN Details	Ativan 2mg PO for severe anxiety    Current Medications:   hydrOXYzine hydrochloride 25 milliGRAM(s) Oral every 6 hours PRN  mirtazapine 15 milliGRAM(s) Oral at bedtime     Medication Side Effects:  • Medication Side Effects or Adverse Reactions (new or ongoing)	None known    MENTAL STATUS EXAM:   • Level of Consciousness	Alert  • General Appearance	Well developed  • Body Habitus	Well nourished  • Hygiene	Fair  • Grooming	Fair  • Behavior	Cooperative  • Eye Contact	Good  • Relatedness	Good  • Impulse Control	Normal  • Muscle Tone / Strength	Normal muscle tone/strength  • Abnormal Movements	No abnormal movements  • Gait / Station	Normal gait / station  • Speech Volume	Normal  • Speech Rate	Normal  • Speech Spontaneity	Normal  • Speech Articulation	Normal  • Mood	Normal  • Affect Quality	Euthymic  • Affect Range	Full  • Affect Congruence	Congruent  • Thought Process	Linear  • Thought Associations	Normal  • Thought Content	Unremarkable  • Perceptions	No abnormalities  • Oriented to Time	Yes  • Oriented to Place	Yes  • Oriented to Situation	Yes  • Oriented to Person	Yes  • Attention / Concentration	Normal  • Estimated Intelligence	Average  • Recent Memory	Normal  • Remote Memory	Normal  • Fund of Knowledge	Normal  • Language	No abnormalities noted  • Judgment (regarding everyday events)	Fair  • Insight (regarding psychiatric illness)	Fair    SUICIDALITY:   • Suicidality (Interval)	none known    HOMICIDALITY/AGGRESSION:   • Homicidality/Aggression	none known    DIAGNOSIS DSM-V:    Psychiatric Diagnosis (Corresponds to DSM-IV Axis I, II):   HEALTH ISSUES - PROBLEM Dx:  Bipolar disorder, current episode mixed, moderate     Medical Diagnosis (Corresponds to DSM-IV Axis III):  • Axis III	see above      ASSESSMENT OF CURRENT CONDITION:   Summary (include case differential, formulation and patient response to therapy): Patient is a 47 y/o F with history of bipolar disorder who presented with anxiety and insomnia x 5 days. Patient received Ativan and Remeron last night for anxiety and sleep. Reports she slept well and feels better this morning. Requesting phone to call her psychiatrist CHINA Rodriguez with [facility]. Wishes to go home.     Risk Assessment (consider static vs modifiable risk factors and protective factors; comment on level of risk for dangerous behavior): low risk of dangerous behavior; denies SI/HI/AH/VH, cites family as protective factor and has appropriate outpatient follow up    PLAN  Patient is a stable for discharge home with copy of lab work done in hospital for patient to give to her outpatient provider PROGRESS NOTE: 21 @ 08:50  	  • Reason for Ongoing Consultation: 	anxiety / insomnia    ID: 47 y/o Female with HEALTH ISSUES - PROBLEM Dx:  Bipolar disorder, current episode mixed, moderate      INTERVAL DATA:   • Interval Chief Complaint: "I can think better now that I've slept"  • Interval History: Patient is a 47 y/o F with history of bipolar disorder who presented with anxiety and insomnia x 5 days. Patient reports she is supposed to take Depakote 500mg 2 tablets at bedtime and Remeron for sleep, prescribed by her psychiatrist CHINA Rodriguez at [facility]. Patient admits that she was due for blood work before being able to receive another prescription for Depakote, but kept putting it off due to work and because the lab was too crowded. States she spoke her her NP over the phone on Thursday, who told her that if she felt okay off the medication, she could try taking a break from it. States she had been completely off her medication for one week once her prescription ran out. States for the last week she has had a lot of energy, has been staying late and overnight at work, feeling like she "could do anything" and that she was "on top of the world". States thinking back now, she "might have been manic". States she became upset last week because her  was being very distant, which prompted her to have "an anxiety attack, I couldn't calm down." States she went to a friends house and had some edibles before going home (endorses she smokes marijuana daily), where she couldn't calm down and her daughter had to call 911. Patient feels like she "lost sense of time" over the last week. Is requesting a phone to call her NP Jennifer to set up an appointment and update her. Wishes to go back home, feels much better now that she has slept. Reports she has a new prescription for Remeron at her pharmacy that she has not picked up yet. Denies SI/HI, AH/VH, depressed mood, anxiety.    REVIEW OF SYSTEMS:   • Constitutional Symptoms	No complaints  • Eyes	No complaints  • Ears / Nose / Throat / Mouth	No complaints  • Cardiovascular	No complaints  • Respiratory	No complaints  • Gastrointestinal	No complaints  • Genitourinary	No complaints  • Musculoskeletal	No complaints  • Skin	No complaints  • Neurological	No complaints  • Psychiatric (see HPI)	See HPI  • Endocrine	No complaints  • Hematologic / Lymphatic	No complaints  • Allergic / Immunologic	No complaints    REVIEW OF VITALS/LABS/IMAGING/INVESTIGATIONS:   • Vital signs reviewed: Yes  • Vital Signs:	    T(C): 36.9 (21 @ 07:47), Max: 37.3 (08-15-21 @ 15:27)  HR: 63 (21 @ 07:47) (63 - 106)  BP: 120/72 (21 @ 07:47) (102/73 - 120/83)  RR: 20 (21 @ 07:47) (18 - 26)  SpO2: 100% (21 @ 07:47) (97% - 100%)    • Available labs reviewed: Yes  • Available Lab Results:                           14.5   11.90 )-----------( 332      ( 15 Aug 2021 16:18 )             40.6     08-15    137  |  102  |  19.2  ----------------------------<  89  3.8   |  18.0<L>  |  0.84    Ca    9.8      15 Aug 2021 16:19    TPro  7.7  /  Alb  4.4  /  TBili  0.6  /  DBili  0.1  /  AST  17  /  ALT  8   /  AlkPhos  100  08-15    LIVER FUNCTIONS - ( 15 Aug 2021 16:19 )  Alb: 4.4 g/dL / Pro: 7.7 g/dL / ALK PHOS: 100 U/L / ALT: 8 U/L / AST: 17 U/L / GGT: x             Urinalysis Basic - ( 16 Aug 2021 08:37 )    Color: Yellow / Appearance: Slightly Turbid / S.025 / pH: x  Gluc: x / Ketone: Trace  / Bili: Negative / Urobili: Negative mg/dL   Blood: x / Protein: 30 mg/dL / Nitrite: Negative   Leuk Esterase: Moderate / RBC: x / WBC x   Sq Epi: x / Non Sq Epi: x / Bacteria: x          MEDICATIONS:      PRN Medications:   • PRN Medications since last evaluation	yes  • PRN Details	Ativan 2mg PO for severe anxiety    Current Medications:   hydrOXYzine hydrochloride 25 milliGRAM(s) Oral every 6 hours PRN  mirtazapine 15 milliGRAM(s) Oral at bedtime     Medication Side Effects:  • Medication Side Effects or Adverse Reactions (new or ongoing)	None known    MENTAL STATUS EXAM:   • Level of Consciousness	Alert  • General Appearance	Well developed  • Body Habitus	Well nourished  • Hygiene	Fair  • Grooming	Fair  • Behavior	Cooperative  • Eye Contact	Good  • Relatedness	Good  • Impulse Control	Normal  • Muscle Tone / Strength	Normal muscle tone/strength  • Abnormal Movements	No abnormal movements  • Gait / Station	Normal gait / station  • Speech Volume	Normal  • Speech Rate	Normal  • Speech Spontaneity	Normal  • Speech Articulation	Normal  • Mood	Normal  • Affect Quality	Euthymic  • Affect Range	Full  • Affect Congruence	Congruent  • Thought Process	Linear  • Thought Associations	Normal  • Thought Content	Unremarkable  • Perceptions	No abnormalities  • Oriented to Time	Yes  • Oriented to Place	Yes  • Oriented to Situation	Yes  • Oriented to Person	Yes  • Attention / Concentration	Normal  • Estimated Intelligence	Average  • Recent Memory	Normal  • Remote Memory	Normal  • Fund of Knowledge	Normal  • Language	No abnormalities noted  • Judgment (regarding everyday events)	Fair  • Insight (regarding psychiatric illness)	Fair    SUICIDALITY:   • Suicidality (Interval)	none known    HOMICIDALITY/AGGRESSION:   • Homicidality/Aggression	none known    DIAGNOSIS DSM-V:    Psychiatric Diagnosis (Corresponds to DSM-IV Axis I, II):   HEALTH ISSUES - PROBLEM Dx:  Bipolar disorder, current episode mixed, moderate     Medical Diagnosis (Corresponds to DSM-IV Axis III):  • Axis III	see above      ASSESSMENT OF CURRENT CONDITION:   Summary (include case differential, formulation and patient response to therapy): Patient is a 47 y/o F with history of bipolar disorder who presented with anxiety and insomnia x 5 days. Patient received Ativan and Remeron last night for anxiety and sleep. Reports she slept well and feels better this morning. Requesting phone to call her psychiatrist CHINA Rodriguez with [facility]. Wishes to go home.     Risk Assessment (consider static vs modifiable risk factors and protective factors; comment on level of risk for dangerous behavior): low risk of dangerous behavior; denies SI/HI/AH/VH, cites family as protective factor and has appropriate outpatient follow up    PLAN  Patient is a stable for discharge home with copy of lab work done in hospital for patient to give to her outpatient provider  [facility] tomorrow at 12:30PM PROGRESS NOTE: 21 @ 08:50  	  • Reason for Ongoing Consultation: 	anxiety / insomnia    ID: 45 y/o Female with HEALTH ISSUES - PROBLEM Dx:  Bipolar disorder, current episode mixed, moderate      INTERVAL DATA:   • Interval Chief Complaint: "I can think better now that I've slept"  • Interval History: Patient is a 45 y/o F with history of bipolar disorder who presented with anxiety and insomnia x 5 days. Patient reports she is supposed to take Depakote 500mg 2 tablets at bedtime and Remeron for sleep, prescribed by her psychiatrist CHINA Rodriguez at [facility]. Patient admits that she was due for blood work before being able to receive another prescription for Depakote, but kept putting it off due to work and because the lab was too crowded. States she spoke her NP over the phone on Thursday, who told her that if she felt okay off the medication, she could try taking a break from it. States she had been completely off her medication for one week once her prescription ran out. States for the last week she has had a lot of energy, has been staying late and overnight at work, feeling like she "could do anything" and that she was "on top of the world". States thinking back now, she "might have been manic". States she became upset last week because her  was being very distant, which prompted her to have "an anxiety attack, I couldn't calm down." States she went to a friends house and had some edibles before going home (endorses she smokes marijuana daily), where she couldn't calm down and her daughter had to call 911. Patient feels like she "lost sense of time" over the last week. Is requesting a phone to call her NP Jennifer to set up an appointment and update her. Wishes to go back home, feels much better now that she has slept. Reports she has a new prescription for Remeron at her pharmacy that she has not picked up yet. Denies SI/HI, AH/VH, depressed mood, anxiety.  Patient urine drug screen came back this morning, positive for cocaine, amphetamines and THC. Patient shocked upon hearing the results. States maybe that is why she got so worked up and "crazy". Patient states she is in disbelief, thinks it might have been mixed in with the edibles she took.     REVIEW OF SYSTEMS:   • Constitutional Symptoms	No complaints  • Eyes	No complaints  • Ears / Nose / Throat / Mouth	No complaints  • Cardiovascular	No complaints  • Respiratory	No complaints  • Gastrointestinal	No complaints  • Genitourinary	No complaints  • Musculoskeletal	No complaints  • Skin	No complaints  • Neurological	No complaints  • Psychiatric (see HPI)	See HPI  • Endocrine	No complaints  • Hematologic / Lymphatic	No complaints  • Allergic / Immunologic	No complaints    REVIEW OF VITALS/LABS/IMAGING/INVESTIGATIONS:   • Vital signs reviewed: Yes  • Vital Signs:	    T(C): 36.9 (21 @ 07:47), Max: 37.3 (08-15-21 @ 15:27)  HR: 63 (21 @ 07:47) (63 - 106)  BP: 120/72 (21 @ 07:47) (102/73 - 120/83)  RR: 20 (21 @ 07:47) (18 - 26)  SpO2: 100% (21 @ 07:47) (97% - 100%)    • Available labs reviewed: Yes  • Available Lab Results:                           14.5   11.90 )-----------( 332      ( 15 Aug 2021 16:18 )             40.6     -15    137  |  102  |  19.2  ----------------------------<  89  3.8   |  18.0<L>  |  0.84    Ca    9.8      15 Aug 2021 16:19    TPro  7.7  /  Alb  4.4  /  TBili  0.6  /  DBili  0.1  /  AST  17  /  ALT  8   /  AlkPhos  100  08-15    LIVER FUNCTIONS - ( 15 Aug 2021 16:19 )  Alb: 4.4 g/dL / Pro: 7.7 g/dL / ALK PHOS: 100 U/L / ALT: 8 U/L / AST: 17 U/L / GGT: x             Urinalysis Basic - ( 16 Aug 2021 08:37 )    Color: Yellow / Appearance: Slightly Turbid / S.025 / pH: x  Gluc: x / Ketone: Trace  / Bili: Negative / Urobili: Negative mg/dL   Blood: x / Protein: 30 mg/dL / Nitrite: Negative   Leuk Esterase: Moderate / RBC: x / WBC x   Sq Epi: x / Non Sq Epi: x / Bacteria: x          MEDICATIONS:      PRN Medications:   • PRN Medications since last evaluation	yes  • PRN Details	Ativan 2mg PO for severe anxiety    Current Medications:   hydrOXYzine hydrochloride 25 milliGRAM(s) Oral every 6 hours PRN  mirtazapine 15 milliGRAM(s) Oral at bedtime     Medication Side Effects:  • Medication Side Effects or Adverse Reactions (new or ongoing)	None known    MENTAL STATUS EXAM:   • Level of Consciousness	Alert  • General Appearance	Well developed  • Body Habitus	Well nourished  • Hygiene	Fair  • Grooming	Fair  • Behavior	Cooperative  • Eye Contact	Good  • Relatedness	Good  • Impulse Control	Normal  • Muscle Tone / Strength	Normal muscle tone/strength  • Abnormal Movements	No abnormal movements  • Gait / Station	Normal gait / station  • Speech Volume	Normal  • Speech Rate	Normal  • Speech Spontaneity	Normal  • Speech Articulation	Normal  • Mood	Normal  • Affect Quality	Euthymic  • Affect Range	Full  • Affect Congruence	Congruent  • Thought Process	Linear  • Thought Associations	Normal  • Thought Content	Unremarkable  • Perceptions	No abnormalities  • Oriented to Time	Yes  • Oriented to Place	Yes  • Oriented to Situation	Yes  • Oriented to Person	Yes  • Attention / Concentration	Normal  • Estimated Intelligence	Average  • Recent Memory	Normal  • Remote Memory	Normal  • Fund of Knowledge	Normal  • Language	No abnormalities noted  • Judgment (regarding everyday events)	Fair  • Insight (regarding psychiatric illness)	Fair    SUICIDALITY:   • Suicidality (Interval)	none known    HOMICIDALITY/AGGRESSION:   • Homicidality/Aggression	none known    DIAGNOSIS DSM-V:    Psychiatric Diagnosis (Corresponds to DSM-IV Axis I, II):   HEALTH ISSUES - PROBLEM Dx:  Bipolar disorder, current episode mixed, moderate     Medical Diagnosis (Corresponds to DSM-IV Axis III):  • Axis III	see above      ASSESSMENT OF CURRENT CONDITION:   Summary (include case differential, formulation and patient response to therapy): Patient is a 45 y/o F with history of bipolar disorder who presented with anxiety and insomnia x 5 days. Patient received Ativan and Remeron last night for anxiety and sleep. Reports she slept well and feels better this morning. Requesting phone to call her psychiatrist CHINA Rodriguez with [facility]. Wishes to go home.     Risk Assessment (consider static vs modifiable risk factors and protective factors; comment on level of risk for dangerous behavior): low risk of dangerous behavior; denies SI/HI/AH/VH, cites family as protective factor and has appropriate outpatient follow up    PLAN  Patient is a stable for discharge home with copy of lab work done in hospital for patient to give to her outpatient provider  Patient made an appointment at [facility] for tomorrow at 12:30PM

## 2021-08-16 NOTE — ED ADULT NURSE REASSESSMENT NOTE - GENERAL PATIENT STATE
comfortable appearance
resting/sleeping
comfortable appearance/cooperative
comfortable appearance/cooperative
comfortable appearance/resting/sleeping
comfortable appearance/resting/sleeping

## 2021-09-25 ENCOUNTER — INPATIENT (INPATIENT)
Facility: HOSPITAL | Age: 46
LOS: 2 days | Discharge: HOME | DRG: 280 | End: 2021-09-28
Attending: INTERNAL MEDICINE | Admitting: INTERNAL MEDICINE
Payer: MEDICAID

## 2021-09-25 VITALS
SYSTOLIC BLOOD PRESSURE: 85 MMHG | DIASTOLIC BLOOD PRESSURE: 53 MMHG | HEIGHT: 63 IN | RESPIRATION RATE: 20 BRPM | HEART RATE: 77 BPM | TEMPERATURE: 98 F | OXYGEN SATURATION: 99 %

## 2021-09-25 DIAGNOSIS — Z30.8 ENCOUNTER FOR OTHER CONTRACEPTIVE MANAGEMENT: Chronic | ICD-10-CM

## 2021-09-25 DIAGNOSIS — R77.8 OTHER SPECIFIED ABNORMALITIES OF PLASMA PROTEINS: ICD-10-CM

## 2021-09-25 DIAGNOSIS — Z90.721 ACQUIRED ABSENCE OF OVARIES, UNILATERAL: Chronic | ICD-10-CM

## 2021-09-25 DIAGNOSIS — Z90.89 ACQUIRED ABSENCE OF OTHER ORGANS: Chronic | ICD-10-CM

## 2021-09-25 DIAGNOSIS — R09.89 OTHER SPECIFIED SYMPTOMS AND SIGNS INVOLVING THE CIRCULATORY AND RESPIRATORY SYSTEMS: ICD-10-CM

## 2021-09-25 LAB
ALBUMIN SERPL ELPH-MCNC: 3.8 G/DL — SIGNIFICANT CHANGE UP (ref 3.3–5.2)
ALP SERPL-CCNC: 78 U/L — SIGNIFICANT CHANGE UP (ref 40–120)
ALT FLD-CCNC: 16 U/L — SIGNIFICANT CHANGE UP
ANION GAP SERPL CALC-SCNC: 16 MMOL/L — SIGNIFICANT CHANGE UP (ref 5–17)
APTT BLD: 32.4 SEC — SIGNIFICANT CHANGE UP (ref 27.5–35.5)
AST SERPL-CCNC: 40 U/L — HIGH
BASOPHILS # BLD AUTO: 0.03 K/UL — SIGNIFICANT CHANGE UP (ref 0–0.2)
BASOPHILS NFR BLD AUTO: 0.4 % — SIGNIFICANT CHANGE UP (ref 0–2)
BILIRUB SERPL-MCNC: <0.2 MG/DL — LOW (ref 0.4–2)
BLD GP AB SCN SERPL QL: SIGNIFICANT CHANGE UP
BUN SERPL-MCNC: 13 MG/DL — SIGNIFICANT CHANGE UP (ref 8–20)
CALCIUM SERPL-MCNC: 9.2 MG/DL — SIGNIFICANT CHANGE UP (ref 8.6–10.2)
CHLORIDE SERPL-SCNC: 102 MMOL/L — SIGNIFICANT CHANGE UP (ref 98–107)
CO2 SERPL-SCNC: 19 MMOL/L — LOW (ref 22–29)
CREAT SERPL-MCNC: 0.7 MG/DL — SIGNIFICANT CHANGE UP (ref 0.5–1.3)
CRP SERPL-MCNC: 60 MG/L — HIGH
EOSINOPHIL # BLD AUTO: 0.02 K/UL — SIGNIFICANT CHANGE UP (ref 0–0.5)
EOSINOPHIL NFR BLD AUTO: 0.2 % — SIGNIFICANT CHANGE UP (ref 0–6)
FERRITIN SERPL-MCNC: 110 NG/ML — SIGNIFICANT CHANGE UP (ref 15–150)
GLUCOSE SERPL-MCNC: 80 MG/DL — SIGNIFICANT CHANGE UP (ref 70–99)
HCG SERPL-ACNC: <4 MIU/ML — SIGNIFICANT CHANGE UP
HCT VFR BLD CALC: 39.3 % — SIGNIFICANT CHANGE UP (ref 34.5–45)
HGB BLD-MCNC: 13.7 G/DL — SIGNIFICANT CHANGE UP (ref 11.5–15.5)
IMM GRANULOCYTES NFR BLD AUTO: 0.7 % — SIGNIFICANT CHANGE UP (ref 0–1.5)
INR BLD: 1.13 RATIO — SIGNIFICANT CHANGE UP (ref 0.88–1.16)
LACTATE BLDV-MCNC: 1.3 MMOL/L — SIGNIFICANT CHANGE UP (ref 0.5–2)
LDH SERPL L TO P-CCNC: 517 U/L — HIGH (ref 98–192)
LYMPHOCYTES # BLD AUTO: 3.4 K/UL — HIGH (ref 1–3.3)
LYMPHOCYTES # BLD AUTO: 39.9 % — SIGNIFICANT CHANGE UP (ref 13–44)
MCHC RBC-ENTMCNC: 32.3 PG — SIGNIFICANT CHANGE UP (ref 27–34)
MCHC RBC-ENTMCNC: 34.9 GM/DL — SIGNIFICANT CHANGE UP (ref 32–36)
MCV RBC AUTO: 92.7 FL — SIGNIFICANT CHANGE UP (ref 80–100)
MONOCYTES # BLD AUTO: 0.85 K/UL — SIGNIFICANT CHANGE UP (ref 0–0.9)
MONOCYTES NFR BLD AUTO: 10 % — SIGNIFICANT CHANGE UP (ref 2–14)
NEUTROPHILS # BLD AUTO: 4.17 K/UL — SIGNIFICANT CHANGE UP (ref 1.8–7.4)
NEUTROPHILS NFR BLD AUTO: 48.8 % — SIGNIFICANT CHANGE UP (ref 43–77)
NT-PROBNP SERPL-SCNC: 1177 PG/ML — HIGH (ref 0–300)
PLATELET # BLD AUTO: 313 K/UL — SIGNIFICANT CHANGE UP (ref 150–400)
POTASSIUM SERPL-MCNC: 4 MMOL/L — SIGNIFICANT CHANGE UP (ref 3.5–5.3)
POTASSIUM SERPL-SCNC: 4 MMOL/L — SIGNIFICANT CHANGE UP (ref 3.5–5.3)
PROCALCITONIN SERPL-MCNC: 0.05 NG/ML — SIGNIFICANT CHANGE UP (ref 0.02–0.1)
PROT SERPL-MCNC: 7.2 G/DL — SIGNIFICANT CHANGE UP (ref 6.6–8.7)
PROTHROM AB SERPL-ACNC: 13 SEC — SIGNIFICANT CHANGE UP (ref 10.6–13.6)
RBC # BLD: 4.24 M/UL — SIGNIFICANT CHANGE UP (ref 3.8–5.2)
RBC # FLD: 12.8 % — SIGNIFICANT CHANGE UP (ref 10.3–14.5)
SARS-COV-2 RNA SPEC QL NAA+PROBE: DETECTED
SODIUM SERPL-SCNC: 137 MMOL/L — SIGNIFICANT CHANGE UP (ref 135–145)
TROPONIN T SERPL-MCNC: 1.41 NG/ML — HIGH (ref 0–0.06)
WBC # BLD: 8.53 K/UL — SIGNIFICANT CHANGE UP (ref 3.8–10.5)
WBC # FLD AUTO: 8.53 K/UL — SIGNIFICANT CHANGE UP (ref 3.8–10.5)

## 2021-09-25 PROCEDURE — 71275 CT ANGIOGRAPHY CHEST: CPT | Mod: 26

## 2021-09-25 PROCEDURE — 99285 EMERGENCY DEPT VISIT HI MDM: CPT

## 2021-09-25 PROCEDURE — 71045 X-RAY EXAM CHEST 1 VIEW: CPT | Mod: 26

## 2021-09-25 PROCEDURE — 99223 1ST HOSP IP/OBS HIGH 75: CPT

## 2021-09-25 PROCEDURE — 93010 ELECTROCARDIOGRAM REPORT: CPT

## 2021-09-25 RX ORDER — ASPIRIN/CALCIUM CARB/MAGNESIUM 324 MG
81 TABLET ORAL DAILY
Refills: 0 | Status: DISCONTINUED | OUTPATIENT
Start: 2021-09-25 | End: 2021-09-28

## 2021-09-25 RX ORDER — SODIUM CHLORIDE 9 MG/ML
1000 INJECTION INTRAMUSCULAR; INTRAVENOUS; SUBCUTANEOUS ONCE
Refills: 0 | Status: COMPLETED | OUTPATIENT
Start: 2021-09-25 | End: 2021-09-25

## 2021-09-25 RX ORDER — CLOPIDOGREL BISULFATE 75 MG/1
75 TABLET, FILM COATED ORAL DAILY
Refills: 0 | Status: DISCONTINUED | OUTPATIENT
Start: 2021-09-26 | End: 2021-09-28

## 2021-09-25 RX ORDER — HEPARIN SODIUM 5000 [USP'U]/ML
3000 INJECTION INTRAVENOUS; SUBCUTANEOUS EVERY 6 HOURS
Refills: 0 | Status: DISCONTINUED | OUTPATIENT
Start: 2021-09-25 | End: 2021-09-26

## 2021-09-25 RX ORDER — HEPARIN SODIUM 5000 [USP'U]/ML
INJECTION INTRAVENOUS; SUBCUTANEOUS
Qty: 25000 | Refills: 0 | Status: DISCONTINUED | OUTPATIENT
Start: 2021-09-25 | End: 2021-09-25

## 2021-09-25 RX ORDER — HEPARIN SODIUM 5000 [USP'U]/ML
3000 INJECTION INTRAVENOUS; SUBCUTANEOUS EVERY 6 HOURS
Refills: 0 | Status: DISCONTINUED | OUTPATIENT
Start: 2021-09-25 | End: 2021-09-25

## 2021-09-25 RX ORDER — HEPARIN SODIUM 5000 [USP'U]/ML
6500 INJECTION INTRAVENOUS; SUBCUTANEOUS ONCE
Refills: 0 | Status: COMPLETED | OUTPATIENT
Start: 2021-09-25 | End: 2021-09-25

## 2021-09-25 RX ORDER — HEPARIN SODIUM 5000 [USP'U]/ML
6500 INJECTION INTRAVENOUS; SUBCUTANEOUS ONCE
Refills: 0 | Status: DISCONTINUED | OUTPATIENT
Start: 2021-09-25 | End: 2021-09-25

## 2021-09-25 RX ORDER — DIVALPROEX SODIUM 500 MG/1
250 TABLET, DELAYED RELEASE ORAL DAILY
Refills: 0 | Status: DISCONTINUED | OUTPATIENT
Start: 2021-09-25 | End: 2021-09-28

## 2021-09-25 RX ORDER — TRAZODONE HCL 50 MG
1 TABLET ORAL
Qty: 0 | Refills: 0 | DISCHARGE

## 2021-09-25 RX ORDER — ARIPIPRAZOLE 15 MG/1
0 TABLET ORAL
Qty: 0 | Refills: 0 | DISCHARGE

## 2021-09-25 RX ORDER — ZINC SULFATE TAB 220 MG (50 MG ZINC EQUIVALENT) 220 (50 ZN) MG
220 TAB ORAL DAILY
Refills: 0 | Status: DISCONTINUED | OUTPATIENT
Start: 2021-09-25 | End: 2021-09-28

## 2021-09-25 RX ORDER — ASCORBIC ACID 60 MG
500 TABLET,CHEWABLE ORAL DAILY
Refills: 0 | Status: DISCONTINUED | OUTPATIENT
Start: 2021-09-25 | End: 2021-09-28

## 2021-09-25 RX ORDER — ACETAMINOPHEN 500 MG
650 TABLET ORAL EVERY 6 HOURS
Refills: 0 | Status: DISCONTINUED | OUTPATIENT
Start: 2021-09-25 | End: 2021-09-28

## 2021-09-25 RX ORDER — DIVALPROEX SODIUM 500 MG/1
500 TABLET, DELAYED RELEASE ORAL AT BEDTIME
Refills: 0 | Status: DISCONTINUED | OUTPATIENT
Start: 2021-09-25 | End: 2021-09-28

## 2021-09-25 RX ORDER — ARIPIPRAZOLE 15 MG/1
1 TABLET ORAL
Qty: 0 | Refills: 0 | DISCHARGE

## 2021-09-25 RX ORDER — MIRTAZAPINE 45 MG/1
30 TABLET, ORALLY DISINTEGRATING ORAL AT BEDTIME
Refills: 0 | Status: DISCONTINUED | OUTPATIENT
Start: 2021-09-25 | End: 2021-09-28

## 2021-09-25 RX ORDER — METOPROLOL TARTRATE 50 MG
12.5 TABLET ORAL
Refills: 0 | Status: DISCONTINUED | OUTPATIENT
Start: 2021-09-25 | End: 2021-09-28

## 2021-09-25 RX ORDER — ALBUTEROL 90 UG/1
2 AEROSOL, METERED ORAL
Qty: 0 | Refills: 0 | DISCHARGE

## 2021-09-25 RX ORDER — NICOTINE POLACRILEX 2 MG
1 GUM BUCCAL DAILY
Refills: 0 | Status: DISCONTINUED | OUTPATIENT
Start: 2021-09-25 | End: 2021-09-28

## 2021-09-25 RX ORDER — HEPARIN SODIUM 5000 [USP'U]/ML
INJECTION INTRAVENOUS; SUBCUTANEOUS
Qty: 25000 | Refills: 0 | Status: DISCONTINUED | OUTPATIENT
Start: 2021-09-25 | End: 2021-09-26

## 2021-09-25 RX ORDER — ALBUTEROL 90 UG/1
2 AEROSOL, METERED ORAL EVERY 6 HOURS
Refills: 0 | Status: DISCONTINUED | OUTPATIENT
Start: 2021-09-25 | End: 2021-09-28

## 2021-09-25 RX ORDER — ARIPIPRAZOLE 15 MG/1
20 TABLET ORAL AT BEDTIME
Refills: 0 | Status: DISCONTINUED | OUTPATIENT
Start: 2021-09-25 | End: 2021-09-28

## 2021-09-25 RX ORDER — HEPARIN SODIUM 5000 [USP'U]/ML
6500 INJECTION INTRAVENOUS; SUBCUTANEOUS EVERY 6 HOURS
Refills: 0 | Status: DISCONTINUED | OUTPATIENT
Start: 2021-09-25 | End: 2021-09-26

## 2021-09-25 RX ORDER — HYDROXYZINE HCL 10 MG
50 TABLET ORAL EVERY 6 HOURS
Refills: 0 | Status: DISCONTINUED | OUTPATIENT
Start: 2021-09-25 | End: 2021-09-28

## 2021-09-25 RX ORDER — HEPARIN SODIUM 5000 [USP'U]/ML
6500 INJECTION INTRAVENOUS; SUBCUTANEOUS EVERY 6 HOURS
Refills: 0 | Status: DISCONTINUED | OUTPATIENT
Start: 2021-09-25 | End: 2021-09-25

## 2021-09-25 RX ADMIN — HEPARIN SODIUM 1400 UNIT(S)/HR: 5000 INJECTION INTRAVENOUS; SUBCUTANEOUS at 19:38

## 2021-09-25 RX ADMIN — SODIUM CHLORIDE 1000 MILLILITER(S): 9 INJECTION INTRAMUSCULAR; INTRAVENOUS; SUBCUTANEOUS at 16:40

## 2021-09-25 RX ADMIN — HEPARIN SODIUM 6500 UNIT(S): 5000 INJECTION INTRAVENOUS; SUBCUTANEOUS at 19:38

## 2021-09-25 NOTE — CONSULT NOTE ADULT - TIME BILLING
Above noted.  Pt with smoking history who is positive for COVID  Prior to COVID, she had cp with exertion and SOB.   She went to [facility] with cp. No records available but she was not rushed to cath alb for about a day. She appears to have had a NSTEMI. The next day as per pt, CE were elevated and she was having cp  she had MI, PCI to LAD.   Pt was also told that she has an apical thrombus  Advised ED attending to start pt on heparin drip for now  cont with DAPT  will need records  Cont with statin therapy  TTE in AM  + COVID, doing better now. COVID infection was about 10-14 days ago.   We will follow with you.

## 2021-09-25 NOTE — ED PROVIDER NOTE - ATTENDING CONTRIBUTION TO CARE
I, Isaac Lee, personally saw the patient with the resident, and completed the key components of the history and physical exam. I then discussed the management plan with the resident.    47 yo F hx of Bipolar Type 1, Lupus, asthma, fibromyalgia recent admission to [facility] [place] for chest pain. Patient had recent stent via right groin and placed on heparin infusion for "clot in the heart". patient signed out AMA because she was unhappy with her care. Patient report that there was multiple discontinuation of her herein drip. EKG without ischemic changes. trop elevated. patient seen by cardiology, restarted on heparin. admitted for further cardiac care.

## 2021-09-25 NOTE — H&P ADULT - NSHPPHYSICALEXAM_GEN_ALL_CORE
Vital Signs Last 24 Hrs  T(F): 98.4 (25 Sep 2021 14:51), Max: 98.4 (25 Sep 2021 14:51)  HR: 77 (25 Sep 2021 14:51) (77 - 77)  BP: 105/65 (25 Sep 2021 16:10) (85/53 - 105/65)  RR: 20 (25 Sep 2021 14:51) (20 - 20)  SpO2: 99% (25 Sep 2021 14:51) (99% - 99%)    Physical Exam:  Constitutional: NAD, awake and alert, well-developed  Eyes: EOMI, PERRL  Mouth: Moist oral mucosa  Neck: Soft and supple, No LAD   Respiratory: cta b/l no wheezing no rhonchi  Cardiovascular: +s1/s2 RRR no edema b/l le  Gastrointestinal: soft nt nd bs+  Vascular: 2+ peripheral pulses, no calf tenderness   Neurological: A/O x 3, no focal deficits  Musculoskeletal: 5/5 strength b/l upper and lower extremities  Skin: Warm, dry, well perfused, right groin hematoma noted from prior cath insertion site   Psych: has insight into condition. Speech slightly pressured. Mood congruent with affect.

## 2021-09-25 NOTE — H&P ADULT - NSHPSOCIALHISTORY_GEN_ALL_CORE
35 pack year smoking history, stopped prior to MI this past week.   rare etoh use.   +marijuana use.     Lives with children, , mother

## 2021-09-25 NOTE — ED PROVIDER NOTE - PROGRESS NOTE DETAILS
Attempted for >30 minutes to contact pt's provider at [facility] today. Spoke with Dr. Idalia Traore that is listed on pt's AMA paperwork but reports that she does not know who this pt is. Attempted to contact patient records but as it is Saturday did not reach anybody. Was forwarded to [facility] Resident phone multiple times but this was unfortunately met by voicemail and unable to leave a message. Plan to w/u here in ED and will likely admit. - Denilson Fernandez, PGY-3

## 2021-09-25 NOTE — H&P ADULT - NSICDXFAMILYHX_GEN_ALL_CORE_FT
FAMILY HISTORY:  Family history of cancer in father  Family history of hypothyroidism    Mother  Still living? Unknown  FH: HTN (hypertension), Age at diagnosis: Age Unknown  FH: hyperlipidemia, Age at diagnosis: Age Unknown

## 2021-09-25 NOTE — ED ADULT TRIAGE NOTE - CHIEF COMPLAINT QUOTE
Patient here with family, pt states that she was at a different hospital and was being treated for blood clots in her heart and was s/p PCI with a stent. Pt states that the staff was not treating her well and she signed out AMA to come here. Pt states that she still needs treatment for the blood clots. Pt diagnosed with COVID on the 17th

## 2021-09-25 NOTE — ED PROVIDER NOTE - OBJECTIVE STATEMENT
47 y/o F pt with pmhx Bipolar Type 1, Lupus, asthma, fibromyalgia presenting today for evaluation after signing out AMA from [facility] today. States that she was admitted there 2 days prior after having an MI, was cathed and had a stent placed. Incidentally was found to have a "clot in my heart" during the cath and started on heparin gtt. Pt states that she did not like the care she was receiving there and subsequently signed out AMA and is here now to continue her care. Pt arriving with no paper work from [facility] other than AMA sheet. Currently is c/o is chest pain that she reports is improved since her cath and groin pain at the cath site. Pt is also incidentally covid+, has been positive since 9/17. Denies any cough, fevers, sob. Pt denies any n/v/d, abdominal pain, dysuria, headache, congestion, sore throat, neck pain, back pain, weakness, numbness, tingling, dizziness, syncope, or other complaint.

## 2021-09-25 NOTE — ED PROVIDER NOTE - MDM ORDERS SUBMITTED SELECTION
Labs/EKG/Imaging Studies/Medications Complex Repair And Epidermal Autograft Text: The defect edges were debeveled with a #15 scalpel blade.  The primary defect was closed partially with a complex linear closure.  Given the location of the defect, shape of the defect and the proximity to free margins an epidermal autograft was deemed most appropriate to repair the remaining defect.  The graft was trimmed to fit the size of the remaining defect.  The graft was then placed in the primary defect, oriented appropriately, and sutured into place.

## 2021-09-25 NOTE — ED PROVIDER NOTE - PHYSICAL EXAMINATION
Gen: no acute distress  Head: normocephalic, atraumatic  Lung: CTAB, no respiratory distress, no wheezing, rales, rhonchi  CV: normal s1/s2, rrr,   Abd: soft, no tenderness, non-distended  MSK: No edema, no visible deformities, full range of motion in all 4 extremities  Neuro: No focal neurologic deficits  Skin: No rash, ecchymosis and tenderness to R groin access site with clean/dry/intact dressing overlying, no pulsatile masses or bruit  Psych: normal affect

## 2021-09-25 NOTE — ED ADULT NURSE NOTE - NSSEPSISSUSPECTED_ED_A_ED
Dr. Carla Ace,     Thank you for your documentation of :    PROCEDURES PERFORMED: Incision and drainage, left foot, and debridement. Using a 15-blade, the necrotic tissue was debrided down to, but not including the bone of the tendons. All nonviable tissues were debrided. In order to accurately code this procedure could you please document if this debridement was \"excisional\",  or a \"nonexcisional debridement\". Also could you clarify the depth, ie SQ tissue, tendon, ...    =>Other Explanation of clinical findings  =>Unable to Determine (no explanation of clinical findings)    Use of a sharp instrument does not always indicate that an excisional debridement was performed. A code is assigned for excisional debridement when the provider documents \"excisional debridement,\" and/or the documentation meets the root operation definition of \"excision\" (cutting out or off, without replacement, a portion of a body part). Documentation of excisional debridement should be specific regarding the type of debridement. Please clarify and document your clinical opinion in the progress notes and discharge summary.      Thank you,  Kaitlynn Luong RN [address] [facility]
No

## 2021-09-25 NOTE — ED PROVIDER NOTE - CLINICAL SUMMARY MEDICAL DECISION MAKING FREE TEXT BOX
Pt presenting for continuation of care after signing out AMA from [facility] today while being treated for a "clot in heart" and recently s/p cath 2 days prior for MI. No covid concerns currently, pt in no respiratory distress. Will attempt to obtain pt records from [facility]. Orders for labs, trop, bnp, coag, t/s, CTA chest, cardaic echo placed. R groin site from cath appears stable. Given concerning hx pt will likely require admission for further monitoring.

## 2021-09-25 NOTE — H&P ADULT - HISTORY OF PRESENT ILLNESS
45 yo female, pmh of Asthma, Bipolar, Lupus, Recently Diagnosed 9/17 with covid19 after having symptoms of body aches and anosmia that has since improved who recently presented to [facility] (formerly [facility]3] this past Thursday after experiencing substernal chest pain that persisted overnight Wednesday into Thursday AM. Says that she was admitted for MI, underwent right groin cath, had 1 stent deployed and was told after a post cath echo that she has "a clot on her heart" that was is unable to better quantify or explain. She says that she felt that she was not receiving adequate care from the staff at that hospital when she was told that she needed to be on heparin with plan to transition to warfarin but at multiple times was disconnected from heparin drip. Patient says that she also way not having any assistance in ambulating to bath room when called nursing staff. Patient signed out AMA from said facility and presented to [facility] ED.     Patient is a 46y old  Female with a PMH of Lupus, Asthma/copd, childhood heart murmur, Gerd and bipolar who recently who has been dxed with covid on 9-17 symtoms with myalgias then 4 days ago she began to experience unrelenting sharp chest pain with associated sob and went to Er and was diagnosed with a Mi.  she went to cath lab and had a artery stented due to thrombosis. she had a repeat echo and was told that she has a blood clot on her heart and was started on heparin and warfarin.  she did not feel they were caring for her for example they left the hepain drip off for several hours. Her daughter works in dietary and she signed out ama and presents to our Er to complete her care   45 yo female, pmh of Asthma, Bipolar, Lupus, Recently Diagnosed 9/17 with covid19 after having symptoms of body aches and anosmia that has since improved who recently presented to [facility] this past Thursday after experiencing substernal chest pain that persisted overnight Wednesday into Thursday AM. Says that she was admitted for MI, underwent right groin cath, had 1 stent deployed and was told after a post cath echo that she has "a clot on her heart" that was is unable to better quantify or explain. She says that she felt that she was not receiving adequate care from the staff at that hospital when she was told that she needed to be on heparin with plan to transition to warfarin but at multiple times was disconnected from heparin drip. Patient says that she also way not having any assistance in ambulating to bath room when called nursing staff. Patient signed out AMA from said facility and presented to [facility] ED.     Patient says that her chest pain has since resolved. She does not have any current complaints. She states that she has not gotten her psych meds for 3 days. Denies any current fevers, chills, headaches, vision changes, dizziness, chest pain, palpitations, dyspnea, cough, abdominal pain, n/v/d, melena, dysuria, hematuria, numbness, weakness.

## 2021-09-25 NOTE — ED PROVIDER NOTE - CARE PLAN
1 Principal Discharge DX:	Elevated troponin  Secondary Diagnosis:	2019 novel coronavirus disease (COVID-19)

## 2021-09-25 NOTE — H&P ADULT - ASSESSMENT
47 yo female, pmh of Asthma, Bipolar, Lupus, Recently Diagnosed 9/17 with covid19 after having symptoms of body aches and anosmia that has since improved who recently presented to [facility] in past week, found to have MI s/p Stents, concern for unknown clot/thrombus who presents to [facility] ED to be admitted.     Admit to Medicine, Dr. Ba  Tele Bed  Ambulate as tolerated   Vitals per routine  DASH/TLC diet      Acute MI s/p PCI   concern for LV thrombus  Cardio Consult appreciated   restarted on heparin GGT  asa and plavix   lopressor 12.5 bid  will attempt to obtain records from prior hospital  Trop and BNP noted elevated in setting of recent cath/MI  TTE pending  CTA Chest ordered, eval for PE due to ?unknown clot? in history     Bipolar Disorder  c/w home meds  Abilify  mirtazapine  Depakote  Atarax prn acute anxiety     COVID19 Positive   No current symptoms  currently saturating well on room air  Supportive Care   Tessalon Perles PRN  Vitamin C and Zinc daily     Nicotine Use  Cessation discussed  patient verbalized does not want to smoke anymore  nicotine patch     DVT PPX: on heparin GGT    Plan of Care discussed with Patient, RN.

## 2021-09-25 NOTE — CONSULT NOTE ADULT - SUBJECTIVE AND OBJECTIVE BOX
Patient is a 46y old  Female with a PMH of Lupus, Asthma/copd, childhood heart murmur, Gerd and bipolar who recently who has been dxed with covid on 9-17 symtoms with myalgias then 4 days ago she began to experience unrelenting sharp chest pain with associated sob and went to Er and was diagnosed with a Mi.  she went to cath lab and had a artery stented due to thrombosis. she had a repeat echo and was told that she has a blood clot on her heart and was started on heparin and warfarin.  she did not feel they were caring for her for example they left the hepain drip off for several hours. Her daughter works in dietary and she signed out ama and presents to our Er to complete her care  She had some upper cp on arrival that did resolve and she is feeling fine n      PAST MEDICAL HISTORY  Lupus  Bipolar 1 disorder, depressed  Asthma/Copd  Heart murmur  GERD    PAST SURGICAL HISTORY  Thyroid nodule s/p surg  2016  H/O oophorectomy  S/P tonsillectomy    ALLERGIES:  NKDA    FAMILY HISTORY:  Family history of cancer in father  Family history of hypothyroidism    SOCIAL HISTORY:  Smokes 1 pack of cigarettes every 3 days  smokes "weed" daily  No eoth  Has partner  Daughter works her in dietary      HOME MEDS  Abilify 20 qd  Hydroxine 50 mg prn anxiety  depakote 500am 250 pm  abiligy 20mg qd        REVIEW OF SYSTEMS:  CONSTITUTIONAL: No fever, weight loss, or fatigue  EYES: No eye pain, visual disturbances, or discharge  ENMT:  No difficulty hearing, tinnitus, vertigo; No sinus or throat pain  NECK: No pain or stiffness  RESPIRATORY: No cough, wheezing, chills or hemoptysis; No Shortness of Breath  CARDIOVASCULAR:  had some cp on arrival but did not feel the same as when she was having mi  gone now  GASTROINTESTINAL: No abdominal or epigastric pain. No nausea, vomiting, or hematemesis; No diarrhea or constipation. No melena or hematochezia.  GENITOURINARY: No dysuria, frequency, hematuria, or incontinence  NEUROLOGICAL: No headaches, memory loss, loss of strength, numbness, or tremors  SKIN: No itching, burning, rashes, or lesions   LYMPH Nodes: No enlarged glands  ENDOCRINE: No heat or cold intolerance; No hair loss  MUSCULOSKELETAL: No joint pain or swelling; No muscle, back, or extremity pain  PSYCHIATRIC: No depression, anxiety, mood swings, or difficulty sleeping  HEME/LYMPH: No easy bruising, or bleeding gums  ALLERY AND IMMUNOLOGIC: No hives or eczema	    Vital Signs Last 24 Hrs  T(C): 36.9 (25 Sep 2021 14:51), Max: 36.9 (25 Sep 2021 14:51)  T(F): 98.4 (25 Sep 2021 14:51), Max: 98.4 (25 Sep 2021 14:51)  HR: 77 (25 Sep 2021 14:51) (77 - 77)  BP: 105/65 (25 Sep 2021 16:10) (85/53 - 105/65)  BP(mean): --  RR: 20 (25 Sep 2021 14:51) (20 - 20)  SpO2: 99% (25 Sep 2021 14:51) (99% - 99%)    Daily Height in cm: 160.02 (25 Sep 2021 14:51)    Daily     I&O's Detail      PHYSICAL EXAM:  Appearance: Normal, well nourished	  HEENT:   Normal oral mucosa, PERRL, EOMI, sclera non-icteric	  Lymphatic: No cervical lymphadenopathy  Cardiovascular: Normal S1 S2 1/6 holosystolic murmur lsb  Respiratory: Lungs clear to auscultation	  Psychiatry: A & O x 3, Mood & affect appropriate  Gastrointestinal:  Soft, Non-tender, + BS, no bruits	right groin ecchynmotic good pulse  Skin: No rashes, No ecchymoses, No cyanosis  Neurologic: Grossly non-focal with full strength in all four extremities  Extremities: Normal range of motion, No clubbing, cyanosis or edema  Vascular: Peripheral pulses palpable 2+ bilaterally      CG:  NSR st changes thru ant/lateral leads  CXR official reading pending    mild elevation right hemidiaphragm otherwise wnl    LABS:                        13.7   8.53  )-----------( 313      ( 25 Sep 2021 16:37 )             39.3     PT/INR - ( 25 Sep 2021 16:37 )   PT: 13.0 sec;   INR: 1.13 ratio       PTT - ( 25 Sep 2021 16:37 )  PTT:32.4 sec    A/P46y old  Female with a PMH of Lupus, Asthma/copd, childhood heart murmur, Gerd and bipolar who recently who has been dxed with covid on 9-17 symptoms with myalgias then 4 days ago she began to experience unrelenting sharp chest pain with associated sob and went to Er and was diagnosed with a Mi.  she went to cath lab and had a artery stented due to thrombosis. she had a repeat echo and was told that she has a blood clot on her heart and was started on heparin and warfarin.  she did not feel they were caring for her for example they left the heparin drip off for several hours. Her daughter works in dietary and she signed out ama and presents to our Er to complete her care  She had some upper cp on arrival that did resolve and she is feeling fine .      S/P AWMI S/P STENT/ likely LV thrombus  Monday will need to get cath report  Plavix asa  Repeat echo   restart heparin /coumadin  metorpolol 12.5 bid    ACTIVE SMOKER  smoking cessation counseling given    GERD  ppi due to triple therapy    Depression c/w Abilify and Depakote           Patient is a 46y old  Female with a PMH of Lupus, Asthma/copd, childhood heart murmur, Gerd and bipolar who recently who has been dxed with covid on 9-17 symtoms with myalgias then 4 days ago she began to experience unrelenting sharp chest pain with associated sob and went to Er and was diagnosed with a Mi.  she went to cath lab and had a artery stented due to thrombosis. she had a repeat echo and was told that she has a blood clot on her heart and was started on heparin and warfarin.  she did not feel they were caring for her for example they left the hepain drip off for several hours. Her daughter works in dietary and she signed out ama and presents to our Er to complete her care  She had some upper cp on arrival that did resolve and she is feeling fine n      PAST MEDICAL HISTORY  Lupus  Bipolar 1 disorder, depressed  Asthma/Copd  Heart murmur  GERD    PAST SURGICAL HISTORY  Thyroid nodule s/p surg  2016  H/O oophorectomy  S/P tonsillectomy    ALLERGIES:  NKDA    FAMILY HISTORY:  Family history of cancer in father  Family history of hypothyroidism    SOCIAL HISTORY:  Smokes 1 pack of cigarettes every 3 days  smokes "weed" daily  No eoth  Has partner  Daughter works her in dietary      HOME MEDS  Abilify 20 qd  Hydroxine 50 mg prn anxiety  depakote 500am 250 pm  abiligy 20mg qd        REVIEW OF SYSTEMS:  CONSTITUTIONAL: No fever, weight loss, or fatigue  EYES: No eye pain, visual disturbances, or discharge  ENMT:  No difficulty hearing, tinnitus, vertigo; No sinus or throat pain  NECK: No pain or stiffness  RESPIRATORY: No cough, wheezing, chills or hemoptysis; No Shortness of Breath  CARDIOVASCULAR:  had some cp on arrival but did not feel the same as when she was having mi  gone now  GASTROINTESTINAL: No abdominal or epigastric pain. No nausea, vomiting, or hematemesis; No diarrhea or constipation. No melena or hematochezia.  GENITOURINARY: No dysuria, frequency, hematuria, or incontinence  NEUROLOGICAL: No headaches, memory loss, loss of strength, numbness, or tremors  SKIN: No itching, burning, rashes, or lesions   LYMPH Nodes: No enlarged glands  ENDOCRINE: No heat or cold intolerance; No hair loss  MUSCULOSKELETAL: No joint pain or swelling; No muscle, back, or extremity pain  PSYCHIATRIC: No depression, anxiety, mood swings, or difficulty sleeping  HEME/LYMPH: No easy bruising, or bleeding gums  ALLERY AND IMMUNOLOGIC: No hives or eczema	    Vital Signs Last 24 Hrs  T(C): 36.9 (25 Sep 2021 14:51), Max: 36.9 (25 Sep 2021 14:51)  T(F): 98.4 (25 Sep 2021 14:51), Max: 98.4 (25 Sep 2021 14:51)  HR: 77 (25 Sep 2021 14:51) (77 - 77)  BP: 105/65 (25 Sep 2021 16:10) (85/53 - 105/65)  BP(mean): --  RR: 20 (25 Sep 2021 14:51) (20 - 20)  SpO2: 99% (25 Sep 2021 14:51) (99% - 99%)    Daily Height in cm: 160.02 (25 Sep 2021 14:51)    Daily     I&O's Detail      PHYSICAL EXAM:  Appearance: Normal, well nourished	  HEENT:   Normal oral mucosa, PERRL, EOMI, sclera non-icteric	  Lymphatic: No cervical lymphadenopathy  Cardiovascular: Normal S1 S2 1/6 holosystolic murmur lsb  Respiratory: Lungs clear to auscultation	  Psychiatry: A & O x 3, Mood & affect appropriate  Gastrointestinal:  Soft, Non-tender, + BS, no bruits	right groin ecchynmotic good pulse  Skin: No rashes, No ecchymoses, No cyanosis  Neurologic: Grossly non-focal with full strength in all four extremities  Extremities: Normal range of motion, No clubbing, cyanosis or edema  Vascular: Peripheral pulses palpable 2+ bilaterally      CG:  NSR st changes thru ant/lateral leads  CXR official reading pending    mild elevation right hemidiaphragm otherwise wnl    LABS:                        13.7   8.53  )-----------( 313      ( 25 Sep 2021 16:37 )             39.3     PT/INR - ( 25 Sep 2021 16:37 )   PT: 13.0 sec;   INR: 1.13 ratio       PTT - ( 25 Sep 2021 16:37 )  PTT:32.4 sec    A/P46y old  Female with a PMH of Lupus, Asthma/copd, childhood heart murmur, Gerd and bipolar who recently who has been dxed with covid on 9-17 symptoms with myalgias then 4 days ago she began to experience unrelenting sharp chest pain with associated sob and went to Er and was diagnosed with a Mi.  she went to cath lab and had a artery stented due to thrombosis. she had a repeat echo and was told that she has a blood clot on her heart and was started on heparin and warfarin.  she did not feel they were caring for her for example they left the heparin drip off for several hours. Her daughter works in dietary and she signed out ama and presents to our Er to complete her care  She had some upper cp on arrival that did resolve and now just having pleuritic cp      S/P AWMI S/P STENT/ likely LV thrombus  Monday will need to get cath report  Plavix asa  Repeat echo   restart heparin /coumadin  metorpolol 12.5 bid    PLEURITIC CP/COVID  DAY 9  CT Angio r/o pe    ACTIVE SMOKER  smoking cessation counseling given    GERD  ppi due to triple therapy    Depression c/w Abilify and Depakote           History from [facility]. she came with no medical records  Was hospitalized at [facility]    Patient is a 46y old  Female with a PMH of Lupus, Asthma/copd, childhood heart murmur, Gerd and bipolar who recently who has been dxed with covid on 9-17 symtoms with myalgias then 4 days ago she began to experience unrelenting sharp chest pain with associated sob and went to Er and was diagnosed with a Mi.  she went to cath lab and had a artery stented due to thrombosis. she had a repeat echo and was told that she has a blood clot on her heart and was started on heparin and warfarin.  she did not feel they were caring for her for example they left the hepain drip off for several hours. Her daughter works in dietary and she signed out ama and presents to our Er to complete her care  She had some upper cp on arrival that did resolve and she is feeling fine n      PAST MEDICAL HISTORY  Lupus  Bipolar 1 disorder, depressed  Asthma/Copd  Heart murmur  GERD    PAST SURGICAL HISTORY  Thyroid nodule s/p surg  2016  H/O oophorectomy  S/P tonsillectomy    ALLERGIES:  NKDA    FAMILY HISTORY:  Family history of cancer in father  Family history of hypothyroidism    SOCIAL HISTORY:  Smokes 1 pack of cigarettes every 3 days  smokes "weed" daily  No eoth  Has partner  Daughter works her in dietary      HOME MEDS  Abilify 20 qd  Hydroxine 50 mg prn anxiety  depakote 500am 250 pm  abiligy 20mg qd        REVIEW OF SYSTEMS:  CONSTITUTIONAL: No fever, weight loss, or fatigue  EYES: No eye pain, visual disturbances, or discharge  ENMT:  No difficulty hearing, tinnitus, vertigo; No sinus or throat pain  NECK: No pain or stiffness  RESPIRATORY: No cough, wheezing, chills or hemoptysis; No Shortness of Breath  CARDIOVASCULAR:  had some cp on arrival but did not feel the same as when she was having mi  gone now  GASTROINTESTINAL: No abdominal or epigastric pain. No nausea, vomiting, or hematemesis; No diarrhea or constipation. No melena or hematochezia.  GENITOURINARY: No dysuria, frequency, hematuria, or incontinence  NEUROLOGICAL: No headaches, memory loss, loss of strength, numbness, or tremors  SKIN: No itching, burning, rashes, or lesions   LYMPH Nodes: No enlarged glands  ENDOCRINE: No heat or cold intolerance; No hair loss  MUSCULOSKELETAL: No joint pain or swelling; No muscle, back, or extremity pain  PSYCHIATRIC: No depression, anxiety, mood swings, or difficulty sleeping  HEME/LYMPH: No easy bruising, or bleeding gums  ALLERY AND IMMUNOLOGIC: No hives or eczema	    Vital Signs Last 24 Hrs  T(C): 36.9 (25 Sep 2021 14:51), Max: 36.9 (25 Sep 2021 14:51)  T(F): 98.4 (25 Sep 2021 14:51), Max: 98.4 (25 Sep 2021 14:51)  HR: 77 (25 Sep 2021 14:51) (77 - 77)  BP: 105/65 (25 Sep 2021 16:10) (85/53 - 105/65)  BP(mean): --  RR: 20 (25 Sep 2021 14:51) (20 - 20)  SpO2: 99% (25 Sep 2021 14:51) (99% - 99%)    Daily Height in cm: 160.02 (25 Sep 2021 14:51)    Daily     I&O's Detail      PHYSICAL EXAM:  Appearance: Normal, well nourished	  HEENT:   Normal oral mucosa, PERRL, EOMI, sclera non-icteric	  Lymphatic: No cervical lymphadenopathy  Cardiovascular: Normal S1 S2 1/6 holosystolic murmur lsb  Respiratory: Lungs clear to auscultation	  Psychiatry: A & O x 3, Mood & affect appropriate  Gastrointestinal:  Soft, Non-tender, + BS, no bruits	right groin ecchynmotic good pulse  Skin: No rashes, No ecchymoses, No cyanosis  Neurologic: Grossly non-focal with full strength in all four extremities  Extremities: Normal range of motion, No clubbing, cyanosis or edema  Vascular: Peripheral pulses palpable 2+ bilaterally      CG:  NSR st changes thru ant/lateral leads  CXR official reading pending    mild elevation right hemidiaphragm otherwise wnl    LABS:                        13.7   8.53  )-----------( 313      ( 25 Sep 2021 16:37 )             39.3     PT/INR - ( 25 Sep 2021 16:37 )   PT: 13.0 sec;   INR: 1.13 ratio       PTT - ( 25 Sep 2021 16:37 )  PTT:32.4 sec    A/P46y old  Female with a PMH of Lupus, Asthma/copd, childhood heart murmur, Gerd and bipolar who recently who has been dxed with covid on 9-17 symptoms with myalgias then 4 days ago she began to experience unrelenting sharp chest pain with associated sob and went to Er and was diagnosed with a Mi.  she went to cath lab and had a artery stented due to thrombosis. she had a repeat echo and was told that she has a blood clot on her heart and was started on heparin and warfarin.  she did not feel they were caring for her for example they left the heparin drip off for several hours. Her daughter works in dietary and she signed out ama and presents to our Er to complete her care  She had some upper cp on arrival that did resolve and now just having pleuritic cp      S/P AWMI S/P STENT/ likely LV thrombus  Monday will need to get cath report  Plavix asa  Repeat echo   restart heparin /coumadin  metorpolol 12.5 bid    PLEURITIC CP/COVID  DAY 9  CT Angio r/o pe    ACTIVE SMOKER  smoking cessation counseling given    GERD  ppi due to triple therapy    Depression c/w Abilify and Depakote           History from [facility]. she came with no medical records  Was hospitalized at [facility]    Patient is a 46y old  Female with a PMH of Lupus, Asthma/copd, childhood heart murmur, Gerd and bipolar who recently who has been dxed with covid on 9-17 symtoms with myalgias then 4 days ago she began to experience unrelenting sharp chest pain with associated sob and went to Er and was diagnosed with a Mi.  she went to cath lab and had a artery stented due to thrombosis. she had a repeat echo and was told that she has a blood clot on her heart and was started on heparin and warfarin.  she did not feel they were caring for her for example they left the hepain drip off for several hours. Her daughter works in dietary and she signed out ama and presents to our Er to complete her care  She had some upper cp on arrival that did resolve and she is feeling fine n      PAST MEDICAL HISTORY  Lupus  Bipolar 1 disorder, depressed  Asthma/Copd  Heart murmur  GERD    PAST SURGICAL HISTORY  Thyroid nodule s/p surg  2016  H/O oophorectomy  S/P tonsillectomy    ALLERGIES:  NKDA    FAMILY HISTORY:  Family history of cancer in father  Family history of hypothyroidism    SOCIAL HISTORY:  Smokes 1 pack of cigarettes every 3 days  smokes "weed" daily  No eoth  Has partner  Daughter works her in dietary      HOME MEDS  Abilify 20 qd  Hydroxine 50 mg prn anxiety  depakote 500am 250 pm  abiligy 20mg qd        REVIEW OF SYSTEMS:  CONSTITUTIONAL: No fever, weight loss, or fatigue  EYES: No eye pain, visual disturbances, or discharge  ENMT:  No difficulty hearing, tinnitus, vertigo; No sinus or throat pain  NECK: No pain or stiffness  RESPIRATORY: No cough, wheezing, chills or hemoptysis; No Shortness of Breath  CARDIOVASCULAR:  had some cp on arrival but did not feel the same as when she was having mi  gone now  GASTROINTESTINAL: No abdominal or epigastric pain. No nausea, vomiting, or hematemesis; No diarrhea or constipation. No melena or hematochezia.  GENITOURINARY: No dysuria, frequency, hematuria, or incontinence  NEUROLOGICAL: No headaches, memory loss, loss of strength, numbness, or tremors  SKIN: No itching, burning, rashes, or lesions   LYMPH Nodes: No enlarged glands  ENDOCRINE: No heat or cold intolerance; No hair loss  MUSCULOSKELETAL: No joint pain or swelling; No muscle, back, or extremity pain  PSYCHIATRIC: No depression, anxiety, mood swings, or difficulty sleeping  HEME/LYMPH: No easy bruising, or bleeding gums  ALLERY AND IMMUNOLOGIC: No hives or eczema	    Vital Signs Last 24 Hrs  T(C): 36.9 (25 Sep 2021 14:51), Max: 36.9 (25 Sep 2021 14:51)  T(F): 98.4 (25 Sep 2021 14:51), Max: 98.4 (25 Sep 2021 14:51)  HR: 77 (25 Sep 2021 14:51) (77 - 77)  BP: 105/65 (25 Sep 2021 16:10) (85/53 - 105/65)  BP(mean): --  RR: 20 (25 Sep 2021 14:51) (20 - 20)  SpO2: 99% (25 Sep 2021 14:51) (99% - 99%)    Daily Height in cm: 160.02 (25 Sep 2021 14:51)    Daily     I&O's Detail      PHYSICAL EXAM:  Appearance: Normal, well nourished	  HEENT:   Normal oral mucosa, PERRL, EOMI, sclera non-icteric	  Lymphatic: No cervical lymphadenopathy  Cardiovascular: Normal S1 S2 1/6 holosystolic murmur lsb  Respiratory: Lungs clear to auscultation	  Psychiatry: A & O x 3, Mood & affect appropriate  Gastrointestinal:  Soft, Non-tender, + BS, no bruits	right groin ecchynmotic good pulse  Skin: No rashes, No ecchymoses, No cyanosis  Neurologic: Grossly non-focal with full strength in all four extremities  Extremities: Normal range of motion, No clubbing, cyanosis or edema  Vascular: Peripheral pulses palpable 2+ bilaterally      CG:  NSR st changes thru ant/lateral leads  CXR official reading pending    mild elevation right hemidiaphragm otherwise wnl    LABS:                        13.7   8.53  )-----------( 313      ( 25 Sep 2021 16:37 )             39.3     PT/INR - ( 25 Sep 2021 16:37 )   PT: 13.0 sec;   INR: 1.13 ratio       PTT - ( 25 Sep 2021 16:37 )  PTT:32.4 sec    A/P46y old  Female with a PMH of Lupus, Asthma/copd, childhood heart murmur, Gerd and bipolar who recently who has been dxed with covid on 9-17 symptoms with myalgias then 4 days ago she began to experience unrelenting sharp chest pain with associated sob and went to Er and was diagnosed with a Mi.  she went to cath lab and had a artery stented due to thrombosis. she had a repeat echo and was told that she has a blood clot on her heart and was started on heparin and warfarin.  she did not feel they were caring for her for example they left the heparin drip off for several hours. Her daughter works in dietary and she signed out ama and presents to our Er to complete her care  She had some upper cp on arrival that did resolve and now just having pleuritic cp      S/P AWMI S/P STENT/ likely LV thrombus  Admit to telemetry  Monday will need to get cath report  Plavix asa  Repeat echo   restart heparin /coumadin  metorpolol 12.5 bid    PLEURITIC CP/COVID  DAY 9  CT Angio r/o pe    ACTIVE SMOKER  smoking cessation counseling given    GERD  ppi due to triple therapy    Depression c/w Abilify and Depakote

## 2021-09-26 LAB
ANION GAP SERPL CALC-SCNC: 10 MMOL/L — SIGNIFICANT CHANGE UP (ref 5–17)
ANION GAP SERPL CALC-SCNC: 13 MMOL/L — SIGNIFICANT CHANGE UP (ref 5–17)
APTT BLD: 77.3 SEC — HIGH (ref 27.5–35.5)
APTT BLD: 83.4 SEC — HIGH (ref 27.5–35.5)
APTT BLD: >200 SEC — CRITICAL HIGH (ref 27.5–35.5)
BASOPHILS # BLD AUTO: 0.03 K/UL — SIGNIFICANT CHANGE UP (ref 0–0.2)
BASOPHILS # BLD AUTO: 0.03 K/UL — SIGNIFICANT CHANGE UP (ref 0–0.2)
BASOPHILS NFR BLD AUTO: 0.3 % — SIGNIFICANT CHANGE UP (ref 0–2)
BASOPHILS NFR BLD AUTO: 0.4 % — SIGNIFICANT CHANGE UP (ref 0–2)
BUN SERPL-MCNC: 10.5 MG/DL — SIGNIFICANT CHANGE UP (ref 8–20)
BUN SERPL-MCNC: 9.2 MG/DL — SIGNIFICANT CHANGE UP (ref 8–20)
CALCIUM SERPL-MCNC: 8.4 MG/DL — LOW (ref 8.6–10.2)
CALCIUM SERPL-MCNC: 8.6 MG/DL — SIGNIFICANT CHANGE UP (ref 8.6–10.2)
CHLORIDE SERPL-SCNC: 103 MMOL/L — SIGNIFICANT CHANGE UP (ref 98–107)
CHLORIDE SERPL-SCNC: 106 MMOL/L — SIGNIFICANT CHANGE UP (ref 98–107)
CO2 SERPL-SCNC: 21 MMOL/L — LOW (ref 22–29)
CO2 SERPL-SCNC: 21 MMOL/L — LOW (ref 22–29)
COVID-19 SPIKE DOMAIN AB INTERP: POSITIVE
COVID-19 SPIKE DOMAIN ANTIBODY RESULT: 177 U/ML — HIGH
CREAT SERPL-MCNC: 0.59 MG/DL — SIGNIFICANT CHANGE UP (ref 0.5–1.3)
CREAT SERPL-MCNC: 0.63 MG/DL — SIGNIFICANT CHANGE UP (ref 0.5–1.3)
EOSINOPHIL # BLD AUTO: 0.04 K/UL — SIGNIFICANT CHANGE UP (ref 0–0.5)
EOSINOPHIL # BLD AUTO: 0.05 K/UL — SIGNIFICANT CHANGE UP (ref 0–0.5)
EOSINOPHIL NFR BLD AUTO: 0.5 % — SIGNIFICANT CHANGE UP (ref 0–6)
EOSINOPHIL NFR BLD AUTO: 0.5 % — SIGNIFICANT CHANGE UP (ref 0–6)
GLUCOSE SERPL-MCNC: 121 MG/DL — HIGH (ref 70–99)
GLUCOSE SERPL-MCNC: 91 MG/DL — SIGNIFICANT CHANGE UP (ref 70–99)
HCT VFR BLD CALC: 34.6 % — SIGNIFICANT CHANGE UP (ref 34.5–45)
HCT VFR BLD CALC: 35.2 % — SIGNIFICANT CHANGE UP (ref 34.5–45)
HCT VFR BLD CALC: 35.4 % — SIGNIFICANT CHANGE UP (ref 34.5–45)
HGB BLD-MCNC: 11.9 G/DL — SIGNIFICANT CHANGE UP (ref 11.5–15.5)
HGB BLD-MCNC: 12.4 G/DL — SIGNIFICANT CHANGE UP (ref 11.5–15.5)
HGB BLD-MCNC: 12.5 G/DL — SIGNIFICANT CHANGE UP (ref 11.5–15.5)
IMM GRANULOCYTES NFR BLD AUTO: 0.4 % — SIGNIFICANT CHANGE UP (ref 0–1.5)
IMM GRANULOCYTES NFR BLD AUTO: 0.5 % — SIGNIFICANT CHANGE UP (ref 0–1.5)
LYMPHOCYTES # BLD AUTO: 3.38 K/UL — HIGH (ref 1–3.3)
LYMPHOCYTES # BLD AUTO: 4.57 K/UL — HIGH (ref 1–3.3)
LYMPHOCYTES # BLD AUTO: 45.1 % — HIGH (ref 13–44)
LYMPHOCYTES # BLD AUTO: 48.7 % — HIGH (ref 13–44)
MCHC RBC-ENTMCNC: 32.2 PG — SIGNIFICANT CHANGE UP (ref 27–34)
MCHC RBC-ENTMCNC: 32.4 PG — SIGNIFICANT CHANGE UP (ref 27–34)
MCHC RBC-ENTMCNC: 32.7 PG — SIGNIFICANT CHANGE UP (ref 27–34)
MCHC RBC-ENTMCNC: 34.4 GM/DL — SIGNIFICANT CHANGE UP (ref 32–36)
MCHC RBC-ENTMCNC: 35 GM/DL — SIGNIFICANT CHANGE UP (ref 32–36)
MCHC RBC-ENTMCNC: 35.5 GM/DL — SIGNIFICANT CHANGE UP (ref 32–36)
MCV RBC AUTO: 92.1 FL — SIGNIFICANT CHANGE UP (ref 80–100)
MCV RBC AUTO: 92.4 FL — SIGNIFICANT CHANGE UP (ref 80–100)
MCV RBC AUTO: 93.5 FL — SIGNIFICANT CHANGE UP (ref 80–100)
MONOCYTES # BLD AUTO: 0.72 K/UL — SIGNIFICANT CHANGE UP (ref 0–0.9)
MONOCYTES # BLD AUTO: 0.79 K/UL — SIGNIFICANT CHANGE UP (ref 0–0.9)
MONOCYTES NFR BLD AUTO: 8.4 % — SIGNIFICANT CHANGE UP (ref 2–14)
MONOCYTES NFR BLD AUTO: 9.6 % — SIGNIFICANT CHANGE UP (ref 2–14)
NEUTROPHILS # BLD AUTO: 3.3 K/UL — SIGNIFICANT CHANGE UP (ref 1.8–7.4)
NEUTROPHILS # BLD AUTO: 3.9 K/UL — SIGNIFICANT CHANGE UP (ref 1.8–7.4)
NEUTROPHILS NFR BLD AUTO: 41.6 % — LOW (ref 43–77)
NEUTROPHILS NFR BLD AUTO: 44 % — SIGNIFICANT CHANGE UP (ref 43–77)
PLATELET # BLD AUTO: 281 K/UL — SIGNIFICANT CHANGE UP (ref 150–400)
PLATELET # BLD AUTO: 285 K/UL — SIGNIFICANT CHANGE UP (ref 150–400)
PLATELET # BLD AUTO: 303 K/UL — SIGNIFICANT CHANGE UP (ref 150–400)
POTASSIUM SERPL-MCNC: 3.4 MMOL/L — LOW (ref 3.5–5.3)
POTASSIUM SERPL-MCNC: 4.3 MMOL/L — SIGNIFICANT CHANGE UP (ref 3.5–5.3)
POTASSIUM SERPL-SCNC: 3.4 MMOL/L — LOW (ref 3.5–5.3)
POTASSIUM SERPL-SCNC: 4.3 MMOL/L — SIGNIFICANT CHANGE UP (ref 3.5–5.3)
RBC # BLD: 3.7 M/UL — LOW (ref 3.8–5.2)
RBC # BLD: 3.82 M/UL — SIGNIFICANT CHANGE UP (ref 3.8–5.2)
RBC # BLD: 3.83 M/UL — SIGNIFICANT CHANGE UP (ref 3.8–5.2)
RBC # FLD: 12.7 % — SIGNIFICANT CHANGE UP (ref 10.3–14.5)
RBC # FLD: 12.8 % — SIGNIFICANT CHANGE UP (ref 10.3–14.5)
RBC # FLD: 12.8 % — SIGNIFICANT CHANGE UP (ref 10.3–14.5)
SARS-COV-2 IGG+IGM SERPL QL IA: 177 U/ML — HIGH
SARS-COV-2 IGG+IGM SERPL QL IA: POSITIVE
SODIUM SERPL-SCNC: 137 MMOL/L — SIGNIFICANT CHANGE UP (ref 135–145)
SODIUM SERPL-SCNC: 137 MMOL/L — SIGNIFICANT CHANGE UP (ref 135–145)
TROPONIN T SERPL-MCNC: 1.51 NG/ML — HIGH (ref 0–0.06)
TROPONIN T SERPL-MCNC: 1.72 NG/ML — HIGH (ref 0–0.06)
WBC # BLD: 7.5 K/UL — SIGNIFICANT CHANGE UP (ref 3.8–10.5)
WBC # BLD: 7.73 K/UL — SIGNIFICANT CHANGE UP (ref 3.8–10.5)
WBC # BLD: 9.39 K/UL — SIGNIFICANT CHANGE UP (ref 3.8–10.5)
WBC # FLD AUTO: 7.5 K/UL — SIGNIFICANT CHANGE UP (ref 3.8–10.5)
WBC # FLD AUTO: 7.73 K/UL — SIGNIFICANT CHANGE UP (ref 3.8–10.5)
WBC # FLD AUTO: 9.39 K/UL — SIGNIFICANT CHANGE UP (ref 3.8–10.5)

## 2021-09-26 PROCEDURE — 99233 SBSQ HOSP IP/OBS HIGH 50: CPT

## 2021-09-26 PROCEDURE — 93010 ELECTROCARDIOGRAM REPORT: CPT

## 2021-09-26 RX ORDER — ATORVASTATIN CALCIUM 80 MG/1
40 TABLET, FILM COATED ORAL AT BEDTIME
Refills: 0 | Status: DISCONTINUED | OUTPATIENT
Start: 2021-09-26 | End: 2021-09-28

## 2021-09-26 RX ORDER — POTASSIUM CHLORIDE 20 MEQ
40 PACKET (EA) ORAL ONCE
Refills: 0 | Status: COMPLETED | OUTPATIENT
Start: 2021-09-26 | End: 2021-09-28

## 2021-09-26 RX ORDER — HEPARIN SODIUM 5000 [USP'U]/ML
3000 INJECTION INTRAVENOUS; SUBCUTANEOUS EVERY 6 HOURS
Refills: 0 | Status: DISCONTINUED | OUTPATIENT
Start: 2021-09-26 | End: 2021-09-28

## 2021-09-26 RX ORDER — HEPARIN SODIUM 5000 [USP'U]/ML
6500 INJECTION INTRAVENOUS; SUBCUTANEOUS EVERY 6 HOURS
Refills: 0 | Status: DISCONTINUED | OUTPATIENT
Start: 2021-09-26 | End: 2021-09-28

## 2021-09-26 RX ORDER — PANTOPRAZOLE SODIUM 20 MG/1
40 TABLET, DELAYED RELEASE ORAL
Refills: 0 | Status: DISCONTINUED | OUTPATIENT
Start: 2021-09-26 | End: 2021-09-28

## 2021-09-26 RX ORDER — POTASSIUM CHLORIDE 20 MEQ
40 PACKET (EA) ORAL ONCE
Refills: 0 | Status: COMPLETED | OUTPATIENT
Start: 2021-09-26 | End: 2021-09-26

## 2021-09-26 RX ORDER — HEPARIN SODIUM 5000 [USP'U]/ML
INJECTION INTRAVENOUS; SUBCUTANEOUS
Qty: 25000 | Refills: 0 | Status: DISCONTINUED | OUTPATIENT
Start: 2021-09-26 | End: 2021-09-27

## 2021-09-26 RX ORDER — ISOSORBIDE MONONITRATE 60 MG/1
30 TABLET, EXTENDED RELEASE ORAL DAILY
Refills: 0 | Status: DISCONTINUED | OUTPATIENT
Start: 2021-09-26 | End: 2021-09-27

## 2021-09-26 RX ADMIN — HEPARIN SODIUM 0 UNIT(S)/HR: 5000 INJECTION INTRAVENOUS; SUBCUTANEOUS at 03:26

## 2021-09-26 RX ADMIN — DIVALPROEX SODIUM 500 MILLIGRAM(S): 500 TABLET, DELAYED RELEASE ORAL at 22:10

## 2021-09-26 RX ADMIN — DIVALPROEX SODIUM 250 MILLIGRAM(S): 500 TABLET, DELAYED RELEASE ORAL at 12:30

## 2021-09-26 RX ADMIN — ARIPIPRAZOLE 20 MILLIGRAM(S): 15 TABLET ORAL at 22:09

## 2021-09-26 RX ADMIN — HEPARIN SODIUM 1100 UNIT(S)/HR: 5000 INJECTION INTRAVENOUS; SUBCUTANEOUS at 13:12

## 2021-09-26 RX ADMIN — ATORVASTATIN CALCIUM 40 MILLIGRAM(S): 80 TABLET, FILM COATED ORAL at 22:23

## 2021-09-26 RX ADMIN — CLOPIDOGREL BISULFATE 75 MILLIGRAM(S): 75 TABLET, FILM COATED ORAL at 12:30

## 2021-09-26 RX ADMIN — PANTOPRAZOLE SODIUM 40 MILLIGRAM(S): 20 TABLET, DELAYED RELEASE ORAL at 12:29

## 2021-09-26 RX ADMIN — MIRTAZAPINE 30 MILLIGRAM(S): 45 TABLET, ORALLY DISINTEGRATING ORAL at 22:09

## 2021-09-26 RX ADMIN — HEPARIN SODIUM 1100 UNIT(S)/HR: 5000 INJECTION INTRAVENOUS; SUBCUTANEOUS at 20:20

## 2021-09-26 RX ADMIN — ZINC SULFATE TAB 220 MG (50 MG ZINC EQUIVALENT) 220 MILLIGRAM(S): 220 (50 ZN) TAB at 12:29

## 2021-09-26 RX ADMIN — Medication 81 MILLIGRAM(S): at 12:31

## 2021-09-26 RX ADMIN — Medication 500 MILLIGRAM(S): at 12:30

## 2021-09-26 RX ADMIN — Medication 100 MILLIGRAM(S): at 22:09

## 2021-09-26 RX ADMIN — Medication 40 MILLIEQUIVALENT(S): at 07:59

## 2021-09-27 LAB
ALBUMIN SERPL ELPH-MCNC: 3.4 G/DL — SIGNIFICANT CHANGE UP (ref 3.3–5.2)
ALP SERPL-CCNC: 77 U/L — SIGNIFICANT CHANGE UP (ref 40–120)
ALT FLD-CCNC: 13 U/L — SIGNIFICANT CHANGE UP
ANION GAP SERPL CALC-SCNC: 13 MMOL/L — SIGNIFICANT CHANGE UP (ref 5–17)
APPEARANCE UR: CLEAR — SIGNIFICANT CHANGE UP
APTT BLD: 35.8 SEC — HIGH (ref 27.5–35.5)
APTT BLD: 78 SEC — HIGH (ref 27.5–35.5)
APTT BLD: >200 SEC — CRITICAL HIGH (ref 27.5–35.5)
AST SERPL-CCNC: 22 U/L — SIGNIFICANT CHANGE UP
BACTERIA # UR AUTO: ABNORMAL
BILIRUB SERPL-MCNC: <0.2 MG/DL — LOW (ref 0.4–2)
BILIRUB UR-MCNC: NEGATIVE — SIGNIFICANT CHANGE UP
BUN SERPL-MCNC: 8.9 MG/DL — SIGNIFICANT CHANGE UP (ref 8–20)
CALCIUM SERPL-MCNC: 8.6 MG/DL — SIGNIFICANT CHANGE UP (ref 8.6–10.2)
CHLORIDE SERPL-SCNC: 108 MMOL/L — HIGH (ref 98–107)
CHOLEST SERPL-MCNC: 157 MG/DL — SIGNIFICANT CHANGE UP
CO2 SERPL-SCNC: 20 MMOL/L — LOW (ref 22–29)
COLOR SPEC: YELLOW — SIGNIFICANT CHANGE UP
CREAT SERPL-MCNC: 0.6 MG/DL — SIGNIFICANT CHANGE UP (ref 0.5–1.3)
DIFF PNL FLD: NEGATIVE — SIGNIFICANT CHANGE UP
EPI CELLS # UR: SIGNIFICANT CHANGE UP
GLUCOSE SERPL-MCNC: 93 MG/DL — SIGNIFICANT CHANGE UP (ref 70–99)
GLUCOSE UR QL: NEGATIVE MG/DL — SIGNIFICANT CHANGE UP
HCT VFR BLD CALC: 35.5 % — SIGNIFICANT CHANGE UP (ref 34.5–45)
HDLC SERPL-MCNC: 43 MG/DL — LOW
HGB BLD-MCNC: 12.2 G/DL — SIGNIFICANT CHANGE UP (ref 11.5–15.5)
KETONES UR-MCNC: NEGATIVE — SIGNIFICANT CHANGE UP
LEUKOCYTE ESTERASE UR-ACNC: ABNORMAL
LIPID PNL WITH DIRECT LDL SERPL: 94 MG/DL — SIGNIFICANT CHANGE UP
MAGNESIUM SERPL-MCNC: 1.9 MG/DL — SIGNIFICANT CHANGE UP (ref 1.6–2.6)
MCHC RBC-ENTMCNC: 32 PG — SIGNIFICANT CHANGE UP (ref 27–34)
MCHC RBC-ENTMCNC: 34.4 GM/DL — SIGNIFICANT CHANGE UP (ref 32–36)
MCV RBC AUTO: 93.2 FL — SIGNIFICANT CHANGE UP (ref 80–100)
NITRITE UR-MCNC: NEGATIVE — SIGNIFICANT CHANGE UP
NON HDL CHOLESTEROL: 114 MG/DL — SIGNIFICANT CHANGE UP
PH UR: 6 — SIGNIFICANT CHANGE UP (ref 5–8)
PLATELET # BLD AUTO: 299 K/UL — SIGNIFICANT CHANGE UP (ref 150–400)
POTASSIUM SERPL-MCNC: 4 MMOL/L — SIGNIFICANT CHANGE UP (ref 3.5–5.3)
POTASSIUM SERPL-SCNC: 4 MMOL/L — SIGNIFICANT CHANGE UP (ref 3.5–5.3)
PROT SERPL-MCNC: 6.5 G/DL — LOW (ref 6.6–8.7)
PROT UR-MCNC: NEGATIVE MG/DL — SIGNIFICANT CHANGE UP
RBC # BLD: 3.81 M/UL — SIGNIFICANT CHANGE UP (ref 3.8–5.2)
RBC # FLD: 12.9 % — SIGNIFICANT CHANGE UP (ref 10.3–14.5)
RBC CASTS # UR COMP ASSIST: SIGNIFICANT CHANGE UP /HPF (ref 0–4)
SODIUM SERPL-SCNC: 141 MMOL/L — SIGNIFICANT CHANGE UP (ref 135–145)
SP GR SPEC: 1.01 — SIGNIFICANT CHANGE UP (ref 1.01–1.02)
TRIGL SERPL-MCNC: 102 MG/DL — SIGNIFICANT CHANGE UP
TROPONIN T SERPL-MCNC: 1.69 NG/ML — HIGH (ref 0–0.06)
TROPONIN T SERPL-MCNC: 1.7 NG/ML — HIGH (ref 0–0.06)
UROBILINOGEN FLD QL: NEGATIVE MG/DL — SIGNIFICANT CHANGE UP
WBC # BLD: 7.65 K/UL — SIGNIFICANT CHANGE UP (ref 3.8–10.5)
WBC # FLD AUTO: 7.65 K/UL — SIGNIFICANT CHANGE UP (ref 3.8–10.5)
WBC UR QL: SIGNIFICANT CHANGE UP

## 2021-09-27 PROCEDURE — 93306 TTE W/DOPPLER COMPLETE: CPT | Mod: 26

## 2021-09-27 PROCEDURE — 99233 SBSQ HOSP IP/OBS HIGH 50: CPT

## 2021-09-27 PROCEDURE — 93010 ELECTROCARDIOGRAM REPORT: CPT

## 2021-09-27 PROCEDURE — 99232 SBSQ HOSP IP/OBS MODERATE 35: CPT

## 2021-09-27 RX ORDER — LISINOPRIL 2.5 MG/1
2.5 TABLET ORAL DAILY
Refills: 0 | Status: DISCONTINUED | OUTPATIENT
Start: 2021-09-27 | End: 2021-09-27

## 2021-09-27 RX ORDER — HEPARIN SODIUM 5000 [USP'U]/ML
1100 INJECTION INTRAVENOUS; SUBCUTANEOUS
Qty: 25000 | Refills: 0 | Status: DISCONTINUED | OUTPATIENT
Start: 2021-09-27 | End: 2021-09-28

## 2021-09-27 RX ORDER — SODIUM CHLORIDE 9 MG/ML
500 INJECTION INTRAMUSCULAR; INTRAVENOUS; SUBCUTANEOUS ONCE
Refills: 0 | Status: COMPLETED | OUTPATIENT
Start: 2021-09-27 | End: 2021-09-27

## 2021-09-27 RX ADMIN — Medication 81 MILLIGRAM(S): at 12:00

## 2021-09-27 RX ADMIN — ATORVASTATIN CALCIUM 40 MILLIGRAM(S): 80 TABLET, FILM COATED ORAL at 22:52

## 2021-09-27 RX ADMIN — DIVALPROEX SODIUM 500 MILLIGRAM(S): 500 TABLET, DELAYED RELEASE ORAL at 22:52

## 2021-09-27 RX ADMIN — PANTOPRAZOLE SODIUM 40 MILLIGRAM(S): 20 TABLET, DELAYED RELEASE ORAL at 06:38

## 2021-09-27 RX ADMIN — ARIPIPRAZOLE 20 MILLIGRAM(S): 15 TABLET ORAL at 21:28

## 2021-09-27 RX ADMIN — SODIUM CHLORIDE 500 MILLILITER(S): 9 INJECTION INTRAMUSCULAR; INTRAVENOUS; SUBCUTANEOUS at 09:42

## 2021-09-27 RX ADMIN — ZINC SULFATE TAB 220 MG (50 MG ZINC EQUIVALENT) 220 MILLIGRAM(S): 220 (50 ZN) TAB at 12:04

## 2021-09-27 RX ADMIN — CLOPIDOGREL BISULFATE 75 MILLIGRAM(S): 75 TABLET, FILM COATED ORAL at 12:00

## 2021-09-27 RX ADMIN — SODIUM CHLORIDE 1000 MILLILITER(S): 9 INJECTION INTRAMUSCULAR; INTRAVENOUS; SUBCUTANEOUS at 01:32

## 2021-09-27 RX ADMIN — HEPARIN SODIUM 1100 UNIT(S)/HR: 5000 INJECTION INTRAVENOUS; SUBCUTANEOUS at 22:53

## 2021-09-27 RX ADMIN — DIVALPROEX SODIUM 250 MILLIGRAM(S): 500 TABLET, DELAYED RELEASE ORAL at 12:06

## 2021-09-27 RX ADMIN — HEPARIN SODIUM 1400 UNIT(S)/HR: 5000 INJECTION INTRAVENOUS; SUBCUTANEOUS at 03:28

## 2021-09-27 RX ADMIN — HEPARIN SODIUM 0 UNIT(S)/HR: 5000 INJECTION INTRAVENOUS; SUBCUTANEOUS at 11:58

## 2021-09-27 RX ADMIN — MIRTAZAPINE 30 MILLIGRAM(S): 45 TABLET, ORALLY DISINTEGRATING ORAL at 22:52

## 2021-09-27 RX ADMIN — HEPARIN SODIUM 0 UNIT(S)/HR: 5000 INJECTION INTRAVENOUS; SUBCUTANEOUS at 21:07

## 2021-09-27 RX ADMIN — HEPARIN SODIUM 6500 UNIT(S): 5000 INJECTION INTRAVENOUS; SUBCUTANEOUS at 03:29

## 2021-09-27 RX ADMIN — SODIUM CHLORIDE 1000 MILLILITER(S): 9 INJECTION INTRAMUSCULAR; INTRAVENOUS; SUBCUTANEOUS at 04:35

## 2021-09-27 RX ADMIN — Medication 500 MILLIGRAM(S): at 11:59

## 2021-09-27 NOTE — CHART NOTE - NSCHARTNOTEFT_GEN_A_CORE
Medicine PA-  Cd @ 8503 for pt c/o CP, pain description sounds pleuritic in nature, denies SOB. Pt hx MI s/p stent and is nervous about a recurrence. She has been hypotensive, NS 500cc bolus x2 given overnight.    VSS- 59-93/61-18-97.7-97%  Gen- Pt A+O x3, talkative and appears comfortable, in NAD  S1S2 ausc  Lungs- clear bilat  Chest- nontender  Abd- soft, nontender  Ext- no edema    -Stat EKG- Medicine PA-  Cd @ 0358 for pt c/o CP, pain description sounds pleuritic in nature, denies SOB. Pt hx MI s/p stent and is nervous about a recurrence. She has been hypotensive, NS 500cc bolus x2 given overnight.    VSS- 59- 93/61-18-97.7-97% RA  Gen- Pt A+O x3, talkative and appears comfortable, in NAD  S1S2 ausc  Lungs- clear bilat  Chest- nontender  Abd- soft, nontender  Ext- no edema    >CP- appears to be pleuritic 2/2 Covid PNA  >Hypotension- improved s/p NS bolus  >Anxiety- reassurance given    -Stat EKG- NSR , non-specific changes  - trend troponins  -cardio following  -continue to monitor

## 2021-09-28 ENCOUNTER — TRANSCRIPTION ENCOUNTER (OUTPATIENT)
Age: 46
End: 2021-09-28

## 2021-09-28 VITALS
TEMPERATURE: 98 F | HEART RATE: 78 BPM | DIASTOLIC BLOOD PRESSURE: 59 MMHG | SYSTOLIC BLOOD PRESSURE: 92 MMHG | OXYGEN SATURATION: 99 % | RESPIRATION RATE: 20 BRPM

## 2021-09-28 LAB
ANION GAP SERPL CALC-SCNC: 13 MMOL/L — SIGNIFICANT CHANGE UP (ref 5–17)
APTT BLD: 42.6 SEC — HIGH (ref 27.5–35.5)
APTT BLD: 65.5 SEC — HIGH (ref 27.5–35.5)
BUN SERPL-MCNC: 8.2 MG/DL — SIGNIFICANT CHANGE UP (ref 8–20)
CALCIUM SERPL-MCNC: 8.8 MG/DL — SIGNIFICANT CHANGE UP (ref 8.6–10.2)
CHLORIDE SERPL-SCNC: 106 MMOL/L — SIGNIFICANT CHANGE UP (ref 98–107)
CO2 SERPL-SCNC: 21 MMOL/L — LOW (ref 22–29)
CREAT SERPL-MCNC: 0.62 MG/DL — SIGNIFICANT CHANGE UP (ref 0.5–1.3)
GLUCOSE SERPL-MCNC: 81 MG/DL — SIGNIFICANT CHANGE UP (ref 70–99)
HCT VFR BLD CALC: 34.7 % — SIGNIFICANT CHANGE UP (ref 34.5–45)
HGB BLD-MCNC: 12.1 G/DL — SIGNIFICANT CHANGE UP (ref 11.5–15.5)
MAGNESIUM SERPL-MCNC: 2 MG/DL — SIGNIFICANT CHANGE UP (ref 1.6–2.6)
MCHC RBC-ENTMCNC: 32.3 PG — SIGNIFICANT CHANGE UP (ref 27–34)
MCHC RBC-ENTMCNC: 34.9 GM/DL — SIGNIFICANT CHANGE UP (ref 32–36)
MCV RBC AUTO: 92.5 FL — SIGNIFICANT CHANGE UP (ref 80–100)
PLATELET # BLD AUTO: 311 K/UL — SIGNIFICANT CHANGE UP (ref 150–400)
POTASSIUM SERPL-MCNC: 4.2 MMOL/L — SIGNIFICANT CHANGE UP (ref 3.5–5.3)
POTASSIUM SERPL-SCNC: 4.2 MMOL/L — SIGNIFICANT CHANGE UP (ref 3.5–5.3)
RBC # BLD: 3.75 M/UL — LOW (ref 3.8–5.2)
RBC # FLD: 12.7 % — SIGNIFICANT CHANGE UP (ref 10.3–14.5)
SODIUM SERPL-SCNC: 139 MMOL/L — SIGNIFICANT CHANGE UP (ref 135–145)
TROPONIN T SERPL-MCNC: 1.16 NG/ML — HIGH (ref 0–0.06)
WBC # BLD: 9.03 K/UL — SIGNIFICANT CHANGE UP (ref 3.8–10.5)
WBC # FLD AUTO: 9.03 K/UL — SIGNIFICANT CHANGE UP (ref 3.8–10.5)

## 2021-09-28 PROCEDURE — 93010 ELECTROCARDIOGRAM REPORT: CPT

## 2021-09-28 PROCEDURE — 83735 ASSAY OF MAGNESIUM: CPT

## 2021-09-28 PROCEDURE — 86769 SARS-COV-2 COVID-19 ANTIBODY: CPT

## 2021-09-28 PROCEDURE — 86140 C-REACTIVE PROTEIN: CPT

## 2021-09-28 PROCEDURE — 36415 COLL VENOUS BLD VENIPUNCTURE: CPT

## 2021-09-28 PROCEDURE — 87086 URINE CULTURE/COLONY COUNT: CPT

## 2021-09-28 PROCEDURE — 93005 ELECTROCARDIOGRAM TRACING: CPT

## 2021-09-28 PROCEDURE — 85730 THROMBOPLASTIN TIME PARTIAL: CPT

## 2021-09-28 PROCEDURE — 85027 COMPLETE CBC AUTOMATED: CPT

## 2021-09-28 PROCEDURE — 83605 ASSAY OF LACTIC ACID: CPT

## 2021-09-28 PROCEDURE — 70450 CT HEAD/BRAIN W/O DYE: CPT | Mod: 26

## 2021-09-28 PROCEDURE — 86850 RBC ANTIBODY SCREEN: CPT

## 2021-09-28 PROCEDURE — U0005: CPT

## 2021-09-28 PROCEDURE — 83880 ASSAY OF NATRIURETIC PEPTIDE: CPT

## 2021-09-28 PROCEDURE — 86900 BLOOD TYPING SEROLOGIC ABO: CPT

## 2021-09-28 PROCEDURE — 96374 THER/PROPH/DIAG INJ IV PUSH: CPT

## 2021-09-28 PROCEDURE — 71275 CT ANGIOGRAPHY CHEST: CPT | Mod: MA

## 2021-09-28 PROCEDURE — 80048 BASIC METABOLIC PNL TOTAL CA: CPT

## 2021-09-28 PROCEDURE — 70450 CT HEAD/BRAIN W/O DYE: CPT

## 2021-09-28 PROCEDURE — 85610 PROTHROMBIN TIME: CPT

## 2021-09-28 PROCEDURE — U0003: CPT

## 2021-09-28 PROCEDURE — 81001 URINALYSIS AUTO W/SCOPE: CPT

## 2021-09-28 PROCEDURE — 82728 ASSAY OF FERRITIN: CPT

## 2021-09-28 PROCEDURE — 99239 HOSP IP/OBS DSCHRG MGMT >30: CPT

## 2021-09-28 PROCEDURE — 84145 PROCALCITONIN (PCT): CPT

## 2021-09-28 PROCEDURE — 71045 X-RAY EXAM CHEST 1 VIEW: CPT

## 2021-09-28 PROCEDURE — 86901 BLOOD TYPING SEROLOGIC RH(D): CPT

## 2021-09-28 PROCEDURE — 84484 ASSAY OF TROPONIN QUANT: CPT

## 2021-09-28 PROCEDURE — 93306 TTE W/DOPPLER COMPLETE: CPT

## 2021-09-28 PROCEDURE — 85025 COMPLETE CBC W/AUTO DIFF WBC: CPT

## 2021-09-28 PROCEDURE — 80061 LIPID PANEL: CPT

## 2021-09-28 PROCEDURE — 80053 COMPREHEN METABOLIC PANEL: CPT

## 2021-09-28 PROCEDURE — 83615 LACTATE (LD) (LDH) ENZYME: CPT

## 2021-09-28 PROCEDURE — 99285 EMERGENCY DEPT VISIT HI MDM: CPT | Mod: 25

## 2021-09-28 PROCEDURE — 84702 CHORIONIC GONADOTROPIN TEST: CPT

## 2021-09-28 PROCEDURE — 99232 SBSQ HOSP IP/OBS MODERATE 35: CPT

## 2021-09-28 RX ORDER — METOPROLOL TARTRATE 50 MG
0.5 TABLET ORAL
Qty: 30 | Refills: 0
Start: 2021-09-28 | End: 2021-10-27

## 2021-09-28 RX ORDER — PANTOPRAZOLE SODIUM 20 MG/1
1 TABLET, DELAYED RELEASE ORAL
Qty: 30 | Refills: 0
Start: 2021-09-28 | End: 2021-10-27

## 2021-09-28 RX ORDER — ZINC SULFATE TAB 220 MG (50 MG ZINC EQUIVALENT) 220 (50 ZN) MG
1 TAB ORAL
Qty: 0 | Refills: 0 | DISCHARGE
Start: 2021-09-28

## 2021-09-28 RX ORDER — APIXABAN 2.5 MG/1
1 TABLET, FILM COATED ORAL
Qty: 60 | Refills: 0
Start: 2021-09-28 | End: 2021-10-27

## 2021-09-28 RX ORDER — DIVALPROEX SODIUM 500 MG/1
1 TABLET, DELAYED RELEASE ORAL
Qty: 0 | Refills: 0 | DISCHARGE

## 2021-09-28 RX ORDER — ACETAMINOPHEN 500 MG
2 TABLET ORAL
Qty: 0 | Refills: 0 | DISCHARGE
Start: 2021-09-28

## 2021-09-28 RX ORDER — ALBUTEROL 90 UG/1
2 AEROSOL, METERED ORAL
Qty: 1 | Refills: 0
Start: 2021-09-28 | End: 2021-10-27

## 2021-09-28 RX ORDER — CLOPIDOGREL BISULFATE 75 MG/1
1 TABLET, FILM COATED ORAL
Qty: 30 | Refills: 0
Start: 2021-09-28 | End: 2021-10-27

## 2021-09-28 RX ORDER — ASPIRIN/CALCIUM CARB/MAGNESIUM 324 MG
1 TABLET ORAL
Qty: 30 | Refills: 0
Start: 2021-09-28 | End: 2021-10-27

## 2021-09-28 RX ORDER — ATORVASTATIN CALCIUM 80 MG/1
1 TABLET, FILM COATED ORAL
Qty: 0 | Refills: 0 | DISCHARGE
Start: 2021-09-28

## 2021-09-28 RX ADMIN — HEPARIN SODIUM 1100 UNIT(S)/HR: 5000 INJECTION INTRAVENOUS; SUBCUTANEOUS at 10:03

## 2021-09-28 RX ADMIN — DIVALPROEX SODIUM 250 MILLIGRAM(S): 500 TABLET, DELAYED RELEASE ORAL at 12:34

## 2021-09-28 RX ADMIN — Medication 40 MILLIEQUIVALENT(S): at 11:09

## 2021-09-28 RX ADMIN — Medication 81 MILLIGRAM(S): at 11:11

## 2021-09-28 RX ADMIN — ZINC SULFATE TAB 220 MG (50 MG ZINC EQUIVALENT) 220 MILLIGRAM(S): 220 (50 ZN) TAB at 12:33

## 2021-09-28 RX ADMIN — Medication 500 MILLIGRAM(S): at 11:07

## 2021-09-28 RX ADMIN — PANTOPRAZOLE SODIUM 40 MILLIGRAM(S): 20 TABLET, DELAYED RELEASE ORAL at 07:36

## 2021-09-28 RX ADMIN — CLOPIDOGREL BISULFATE 75 MILLIGRAM(S): 75 TABLET, FILM COATED ORAL at 11:10

## 2021-09-28 NOTE — DISCHARGE NOTE PROVIDER - CARE PROVIDER_API CALL
Leoncio Vasquez)  Cardiovascular Disease  [address] [facility], [address] [place] [address]  Phone: [phone]  Fax: [phone]  Follow Up Time: 1 week    primary care,   Phone: (   )    -  Fax: (   )    -  Follow Up Time: 1-3 days

## 2021-09-28 NOTE — PROGRESS NOTE ADULT - ASSESSMENT
45 yo female, pmh of Asthma, Bipolar, Lupus, Recently Diagnosed 9/17 with covid19, initially  presented to [facility] for cp ,  found to have MI s/p Stents. was started on heparin gtt for clot ( unclear if intracardiac thrombus vs pe ) , left ama from there / states  drove her to [facility] ( patient lives in [place] ) and admitted for further care. bedside echo in ed didnot  show any thrombu s, cta neg for pe.    > Acute MI s/p PCI at [facility] :   - c/w heparin GGT  - s/p lad stent at [facility]   - < from: CT Angio Chest PE Protocol w/ IV Cont (09.25.21 @ 22:35) >No evidence for pulmonary embolism or other acute findings.3 mm nodules in the bilateral upper lobes.  Preliminary report was given by the overnight radiologist  - bedside echo in ed showed no thrombus ,   < from: TTE Echo Complete w/o Contrast w/ Doppler (09.27.21 @ 10:19) > Normal global left ventricular systolic function. Left ventricular ejection fraction, by visual estimation, is 50 to 55%. There is no evidence of pericardial effusion.  - c/w asa and plavix , bb   - imdur dcd due to hypotension   - as per cardio / plan for possible repeat cath this admission     > numbness / tingling   - possible neuropathy vs due to hypotension/ sxs now resolved   - cth pending to r/o cva     > Bipolar Disorder  -c/w home meds, Abilify, mirtazapine, Depakote, Atarax prn acute anxiety     >COVID19 Positive   -No current symptoms  -currently saturating well on room air  -Supportive Care   -Tessalon Perles PRN  -Vitamin C and Zinc daily     >Nicotine Use  - c/w nicotine patch     >DVT PPX: on heparin GGT    
46y old  Female with a PMH of Covid positive day 10, Lupus, Asthma/copd, childhood heart murmur, Gerd and bipolar who recently who has been dxed with covid on 9-17 symptoms with myalgias then 4 days ago she began to experience unrelenting sharp chest pain with associated sob and went to Er and was diagnosed with a Mi.  she went to cath lab and had a artery stented due to thrombosis. she had a repeat echo and was told that she has a blood clot on her heart and was started on heparin and warfarin.  she did not feel they were caring for her for example they left the heparin drip off for several hours. Her daughter works in dietary and she signed out ama and presents to our Er to complete her care  She had some upper cp on arrival that did resolve and now just having pleuritic cp    9/28: CT Head-No acute intracranial hemorrhage or acute territorial infarct. Patient stated today as per hospitalizst note that her symptoms of blurry vision and tingling resolved. Echo-shows evidence of apical thrombus. Pt to be DC'd on Eliquis 5mg BID with plan for outpatient followup.     s/p AWMI   s/p LAD stent 9/23-cath report attained from [facility] in [place]  ASA, Plavix, Heparin   Unable to add imdur d/t BP  Repeat Trop- 1.16  Echo-shows evidence of apical thrombus  Pt to be DC'd on Eliquis 5mg BID      COVID +   CT Angio- neg for pe    GERD  PPI    Assessment and recommendations are final when note is signed by the attending. 
46y old  Female with a PMH of Covid positive day 10, Lupus, Asthma/copd, childhood heart murmur, Gerd and bipolar who recently who has been dxed with covid on 9-17 symptoms with myalgias then 4 days ago she began to experience unrelenting sharp chest pain with associated sob and went to Er and was diagnosed with a Mi.  she went to cath lab and had a artery stented due to thrombosis. she had a repeat echo and was told that she has a blood clot on her heart and was started on heparin and warfarin.  she did not feel they were caring for her for example they left the heparin drip off for several hours. Her daughter works in dietary and she signed out ama and presents to our Er to complete her care  She had some upper cp on arrival that did resolve and now just having pleuritic cp    s/p AWMI s/p Lad stent  we need full report from [facility]/ + clot  asa plavix heparin  add imdur  stat ekg and trop  patient educated to call if pain reoccurs  smoking cessation counseling    COVID + DAY 10  ct angio neg for pe    Gerd  ppi    
47 yo female, pmh of Asthma, Bipolar, Lupus, Recently Diagnosed 9/17 with covid19, initially  presented to [facility] for cp ,  found to have MI s/p Stents. was started on heparin gtt for clot ( unclear if intracardiac thrombus vs pe ) , left ama from there / states  drove her to [facility] ( patient lives in [place] ) and admitted for further care. bedside echo in ed didnot  show any thrombu s, cta neg for pe.    > Acute MI s/p PCI at [facility] :   - ? concern for LV thrombus  - c/w heparin GGT  - c/w asa and plavix , bb , imdur   - will attempt to obtain records from prior hospital  - < from: CT Angio Chest PE Protocol w/ IV Cont (09.25.21 @ 22:35) >No evidence for pulmonary embolism or other acute findings.3 mm nodules in the bilateral upper lobes.  Preliminary report was given by the overnight radiologist  - echo pending     > Bipolar Disorder  -c/w home meds, Abilify, mirtazapine, Depakote, Atarax prn acute anxiety     >COVID19 Positive   -No current symptoms  -currently saturating well on room air  -Supportive Care   -Tessalon Perles PRN  -Vitamin C and Zinc daily     >Nicotine Use  - c/w nicotine patch     >DVT PPX: on heparin GGT    
46y old  Female with a PMH of Covid positive day 10, Lupus, Asthma/copd, childhood heart murmur, Gerd and bipolar who recently who has been dxed with covid on 9-17 symptoms with myalgias then 4 days ago she began to experience unrelenting sharp chest pain with associated sob and went to Er and was diagnosed with a Mi.  she went to cath lab and had a artery stented due to thrombosis. she had a repeat echo and was told that she has a blood clot on her heart and was started on heparin and warfarin.  she did not feel they were caring for her for example they left the heparin drip off for several hours. Her daughter works in dietary and she signed out ama and presents to our Er to complete her care  She had some upper cp on arrival that did resolve and now just having pleuritic cp    9/27: Pt denies palpitations, SOB. Patient c/o midsternal chest pain with associated "tingling" sensation bilateral arms, legs, and wornsening blurry vision. Plan for CT Head-no contrast. Possible repeat cath this admission. Repeat EKG and Trop in AM. Tele-SB/SA 50-80s, occasional [place]. Preliminary reading of echo shows EF 40-45%, official reading pending. Cont Lopressor 12.5mg BID. Start Lisinopril 2.5mg. Unable to add Imdur d/t BP. Echo-EF 50-55%, normal RV size and fx., RA/LA normal size and fx., mild MVR, no aortic valve stenosis, no evidence of pericardial effusion.         s/p AWMI   s/p LAD stent 9/23-cath report attained from [facility] in [place]  ASA, Plavix, Heparin   Unable to add imdur d/t BP  Repeat EKG and Trop in AM  Patient complaining of sharp midsternal chest pain   smoking cessation counseling  Possible repeat cath this admission      COVID +   CT Angio- neg for pe    GERD  PPI    
47 yo female, pmh of Asthma, Bipolar, Lupus, Recently Diagnosed 9/17 with covid19, initially  presented to [facility] for cp ,  found to have MI s/p Stents. was started on heparin gtt for clot ( unclear if intracardiac thrombus vs pe ) , left ama from there / states  drove her to [facility] ( patient lives in [place] ) and admitted for further care. bedside echo in ed didnot  show any thrombu s, cta neg for pe.    > Acute MI s/p PCI at [facility] :   - c/w heparin GGT  - c/w asa and plavix , bb , imdur   - will attempt to obtain records from prior hospital  - < from: CT Angio Chest PE Protocol w/ IV Cont (09.25.21 @ 22:35) >No evidence for pulmonary embolism or other acute findings.3 mm nodules in the bilateral upper lobes.  Preliminary report was given by the overnight radiologist  - bedside echo in ed showed no thrombus , tte report pending   - will hold imdur for now due to hypotension.     > Bipolar Disorder  -c/w home meds, Abilify, mirtazapine, Depakote, Atarax prn acute anxiety     >COVID19 Positive   -No current symptoms  -currently saturating well on room air  -Supportive Care   -Tessalon Perles PRN  -Vitamin C and Zinc daily     >Nicotine Use  - c/w nicotine patch     >DVT PPX: on heparin GGT

## 2021-09-28 NOTE — DISCHARGE NOTE PROVIDER - NSDCCPCAREPLAN_GEN_ALL_CORE_FT
PRINCIPAL DISCHARGE DIAGNOSIS  Diagnosis: Elevated troponin  Assessment and Plan of Treatment: you had coronary stent placed  for coronary aretery disease.   cotinue with medications as advised.   follow up with cardiology in 1 week.      SECONDARY DISCHARGE DIAGNOSES  Diagnosis: 2019 novel coronavirus disease (COVID-19)  Assessment and Plan of Treatment: continue with self quarantine for 10 days.

## 2021-09-28 NOTE — DISCHARGE NOTE PROVIDER - HOSPITAL COURSE
47 yo female, pmh of Asthma, Bipolar, Lupus, Recently Diagnosed 9/17 with covid19, initially  presented to [facility] for cp ,  found to have MI s/p Stents. was started on heparin gtt for clot ( unclear if intracardiac thrombus vs pe ) , left ama from there / states  drove her to [facility] ( patient lives in [place] ) and admitted for further care. a/w  Acute MI s/p PCI at [facility]. s/p   heparin GGT. s/p lad stent at [facility] . < from: CT Angio Chest PE Protocol w/ IV Cont (09.25.21 @ 22:35) >No evidence for pulmonary embolism or other acute findings.3 mm nodules in the bilateral upper lobes. TTE Echo Complete w/o Contrast w/ Doppler (09.27.21 @ 10:19) > Normal global left ventricular systolic function. Left ventricular ejection fraction, by visual estimation, is 50 to 55%. There is no evidence of pericardial effusion.  patient is cleared  by cardio to be dcd home . patient to  c/w asa and plavix , bb and eliquis 5 mg po bid.  imdur dcd due to hypotension   patient also had episode of  numbness / tingling ,  possible neuropathy vs due to hypotension/ sxs now resolved .  cth neg for any acute changes.     > Bipolar Disorder  -c/w home meds, Abilify, mirtazapine, Depakote, Atarax prn acute anxiety     >COVID19 Positive   -No current symptoms  -currently saturating well on room air  -Supportive Care   -Tessalon Perles PRN  -Vitamin C and Zinc daily     >Nicotine Use  - cessation advised       Vital Signs Last 24 Hrs  T(C): 37 (28 Sep 2021 09:52), Max: 37.1 (28 Sep 2021 04:43)  T(F): 98.6 (28 Sep 2021 09:52), Max: 98.8 (28 Sep 2021 04:43)  HR: 69 (28 Sep 2021 09:52) (69 - 80)  BP: 87/63 (28 Sep 2021 09:52) (87/63 - 109/72)  BP(mean): --  RR: 20 (28 Sep 2021 09:52) (18 - 20)  SpO2: 94% (28 Sep 2021 09:52) (94% - 98%)    Physical Exam:  Constitutional: NAD, awake and alert, well-developed  Neck: Soft and supple, No LAD   Respiratory: cta b/l no wheezing no rhonchi  Cardiovascular: +s1/s2 RRR no edema b/l le  Gastrointestinal: soft nt nd bs+  Vascular: 2+ peripheral pulses, no calf tenderness   Neurological: A/O x 3, no focal deficits noted , sensory grossly intact.   Musculoskeletal: 5/5 strength b/l upper and lower extremities  Skin: Warm, dry, well perfused, right groin hematoma noted from prior cath insertion site     time spent for this dc 43 mins

## 2021-09-28 NOTE — DISCHARGE NOTE PROVIDER - NSDCMRMEDTOKEN_GEN_ALL_CORE_FT
Abilify 20 mg oral tablet: 1 tab(s) orally once a day (at bedtime)  acetaminophen 325 mg oral tablet: 2 tab(s) orally every 6 hours, As needed, Temp greater or equal to 38C (100.4F), Mild Pain (1 - 3)  albuterol 90 mcg/inh inhalation aerosol: 2 puff(s) inhaled every 6 hours, As needed, Shortness of Breath and/or Wheezing  aspirin 81 mg oral delayed release tablet: 1 tab(s) orally once a day  Atarax 50 mg oral tablet: 1 tab(s) orally 4 times a day  atorvastatin 40 mg oral tablet: 1 tab(s) orally once a day (at bedtime)  benzonatate 100 mg oral capsule: 1 cap(s) orally 3 times a day, As needed, Cough  clopidogrel 75 mg oral tablet: 1 tab(s) orally once a day  Depakote  mg oral tablet, extended release: 2 tab(s) orally once a day (at bedtime)  Eliquis 5 mg oral tablet: 1 tab(s) orally 2 times a day   metoprolol tartrate 25 mg oral tablet: 0.5 tab(s) orally 2 times a day   mirtazapine 30 mg oral tablet, disintegratin tab(s) orally once a day (at bedtime)  pantoprazole 40 mg oral delayed release tablet: 1 tab(s) orally once a day (before a meal)  zinc sulfate 220 mg oral capsule: 1 cap(s) orally once a day

## 2021-09-28 NOTE — DISCHARGE NOTE NURSING/CASE MANAGEMENT/SOCIAL WORK - PATIENT PORTAL LINK FT
You can access the FollowMyHealth Patient Portal offered by [facility] by registering at the following website: [email or web address]. By joining [facility]’s FollowMyHealth portal, you will also be able to view your health information using other applications (apps) compatible with our system.

## 2021-09-28 NOTE — DISCHARGE NOTE PROVIDER - PROVIDER TOKENS
PROVIDER:[TOKEN:[[id number]],FOLLOWUP:[1 week]],FREE:[LAST:[primary care],PHONE:[(   )    -],FAX:[(   )    -],FOLLOWUP:[1-3 days]]

## 2021-09-28 NOTE — PROGRESS NOTE ADULT - SUBJECTIVE AND OBJECTIVE BOX
[place] [facility]                                                               Office: [address] [address]                                                              Telephone: [phone]. Fax:[phone]                                                                             PROGRESS NOTE  Reason for follow up: Recent MI  Overnight: No new events.   Update: 9/28: CT Head-No acute intracranial hemorrhage or acute territorial infarct. Patient stated today as per hospitalizst note that her symptoms of blurry vision and tingling resolved. Echo-shows evidence of apical thrombus. Pt to be DC'd on Eliquis 5mg BID with plan for outpatient followup.     Subjective: No new events      	  Vitals:  T(C): 36.9 (09-28-21 @ 16:15), Max: 37.1 (09-28-21 @ 04:43)  HR: 78 (09-28-21 @ 16:15) (69 - 78)  BP: 92/59 (09-28-21 @ 16:15) (87/63 - 102/65)  RR: 20 (09-28-21 @ 16:15) (18 - 20)  SpO2: 99% (09-28-21 @ 16:15) (94% - 99%)    I&O's Summary    27 Sep 2021 07:01  -  28 Sep 2021 07:00  --------------------------------------------------------  IN: 754 mL / OUT: 2000 mL / NET: -1246 mL    28 Sep 2021 07:01  -  28 Sep 2021 18:59  --------------------------------------------------------  IN: 557 mL / OUT: 0 mL / NET: 557 mL      Weight (kg): 75.75 (09-25 @ 17:30)      PHYSICAL EXAM:  Appearance: Comfortable. No acute distress  HEENT:  Head and neck: Atraumatic. Normocephalic.  Normal oral mucosa, PERRL, Neck is supple. No JVD, No carotid bruit.   Neurologic: A & O x 3, no focal deficits. EOMI.  Lymphatic: No cervical lymphadenopathy  Cardiovascular: Normal S1 S2, No murmur, rubs/gallops. No JVD, No edema  Respiratory: Lungs clear to auscultation  Gastrointestinal:  Soft, Non-tender, + BS  Lower Extremities: No edema  Psychiatry: Patient is calm. No agitation. Mood & affect appropriate  Skin: No rashes/ ecchymoses/cyanosis/ulcers visualized on the face, hands or feet.      CURRENT MEDICATIONS:  metoprolol tartrate 12.5 milliGRAM(s) Oral two times a day  ARIPiprazole  diVALproex ER  diVALproex ER  mirtazapine  pantoprazole    Tablet  atorvastatin  ascorbic acid  aspirin enteric coated  clopidogrel Tablet  zinc sulfate      DIAGNOSTIC TESTING:    [ ] Echocardiogram: < from: TTE Echo Complete w/o Contrast w/ Doppler (09.27.21 @ 10:19) >  Summary:   1. Endocardial visualization was enhanced with intravenous echo contrast.   2. Normal global left ventricular systolic function.   3. Left ventricular ejection fraction, by visual estimation, is 50 to 55%.   4. Normal right ventricular size and function.   5. The right atrium is normal in size.   6. The left atrium is normal in size.   7. Mild thickening of the anterior and posterior mitral valve leaflets.   8. Mild mitral valve regurgitation.   9. No aortic valve stenosis.  10. There is no evidence of pericardial effusion.  11. Recommend clinical correlation with the above findings.        OTHER: 	    CT:< from: CT Head No Cont (09.28.21 @ 13:24) >  IMPRESSION:  No acute intracranial hemorrhage or acute territorial infarct.  If symptoms persist, follow-up MRI exam recommended.          LABS:	 	  CARDIAC MARKERS ( 28 Sep 2021 08:30 )  x     / 1.16 ng/mL / x     / x     / x      p-BNP 28 Sep 2021 08:30: x    , CARDIAC MARKERS ( 27 Sep 2021 20:13 )  x     / 1.69 ng/mL / x     / x     / x      p-BNP 27 Sep 2021 20:13: x    , CARDIAC MARKERS ( 27 Sep 2021 08:18 )  x     / 1.70 ng/mL / x     / x     / x      p-BNP 27 Sep 2021 08:18: x    , CARDIAC MARKERS ( 26 Sep 2021 10:00 )  x     / 1.51 ng/mL / x     / x     / x      p-BNP 26 Sep 2021 10:00: x    , CARDIAC MARKERS ( 26 Sep 2021 01:20 )  x     / 1.72 ng/mL / x     / x     / x      p-BNP 26 Sep 2021 01:20: x                              12.1   9.03  )-----------( 311      ( 28 Sep 2021 08:30 )             34.7     09-28    139  |  106  |  8.2  ----------------------------<  81  4.2   |  21.0<L>  |  0.62    Ca    8.8      28 Sep 2021 08:30  Mg     2.0     09-28    TPro  6.5<L>  /  Alb  3.4  /  TBili  <0.2<L>  /  DBili  x   /  AST  22  /  ALT  13  /  AlkPhos  77  09-27    proBNP: Serum Pro-Brain Natriuretic Peptide: 1177 pg/mL (09-25 @ 16:37)    Lipid Profile: Date: 09-27 @ 08:18  Total cholesterol 157; Direct LDL: --; HDL: 43; Triglycerides:102        TELEMETRY: 	NSR HR @ 70- 80s       
[facility]  [address] [place] [address]      SUBJECTIVE / OVERNIGHT EVENTS:  Patient feels fine now but had chest discomfort this am -> not pleuritic  states it felt like when she had her heart attack  pain resolved spontaneously   She did not report it to the RN  Telemetry  nsr  ROS:    RESP: No mucus production no uri symptoms  GI:  No melana no abd pain  EXTREMITIES:  no calf tenderness no edema      MEDICATIONS  (STANDING):  ARIPiprazole 20 milliGRAM(s) Oral at bedtime  ascorbic acid 500 milliGRAM(s) Oral daily  aspirin enteric coated 81 milliGRAM(s) Oral daily  clopidogrel Tablet 75 milliGRAM(s) Oral daily  diVALproex  milliGRAM(s) Oral at bedtime  diVALproex  milliGRAM(s) Oral daily  heparin  Infusion.  Unit(s)/Hr (14 mL/Hr) IV Continuous <Continuous>  metoprolol tartrate 12.5 milliGRAM(s) Oral two times a day  mirtazapine 30 milliGRAM(s) Oral at bedtime  nicotine -  14 mG/24Hr(s) Patch 1 Patch Transdermal daily  potassium chloride    Tablet ER 40 milliEquivalent(s) Oral once  zinc sulfate 220 milliGRAM(s) Oral daily    MEDICATIONS  (PRN):  acetaminophen   Tablet .. 650 milliGRAM(s) Oral every 6 hours PRN Temp greater or equal to 38C (100.4F), Mild Pain (1 - 3)  ALBUTerol    90 MICROgram(s) HFA Inhaler 2 Puff(s) Inhalation every 6 hours PRN Shortness of Breath and/or Wheezing  benzonatate 100 milliGRAM(s) Oral three times a day PRN Cough  heparin   Injectable 6500 Unit(s) IV Push every 6 hours PRN For aPTT less than 40  heparin   Injectable 3000 Unit(s) IV Push every 6 hours PRN For aPTT between 40 - 57  hydrOXYzine hydrochloride 50 milliGRAM(s) Oral every 6 hours PRN Anxiety    Vital Signs Last 24 Hrs  T(C): 36.4 (26 Sep 2021 08:20), Max: 37.2 (26 Sep 2021 02:17)  T(F): 97.6 (26 Sep 2021 08:20), Max: 98.9 (26 Sep 2021 02:17)  HR: 73 (26 Sep 2021 08:20) (73 - 77)  BP: 91/60 (26 Sep 2021 08:20) (80/50 - 105/65)  BP(mean): --  RR: 20 (26 Sep 2021 08:20) (16 - 20)  SpO2: 96% (26 Sep 2021 08:20) (96% - 100%)  I&O's Summary      PHYSICAL EXAM:  GENERAL: NAD, well-developed  EYES: EOMI, PERRLA, conjunctiva and sclera clear  NECK:  No JVD  CHEST/LUNG: CTABL ; No wheeze  HEART: RRR; No murmurs  ABDOMEN: Soft, Nontender, Nondistended; Bowel sounds present  EXTREMITIES:  2+ Peripheral Pulses, No edema  PSYCH: AAOx3  NEUROLOGY: non-focal  SKIN: No rashes or lesions. No sacral ulcer    LABS:                        12.4   7.50  )-----------( 285      ( 26 Sep 2021 10:02 )             35.4     09-26    137  |  106  |  9.2  ----------------------------<  91  4.3   |  21.0<L>  |  0.59    Ca    8.6      26 Sep 2021 10:00    TPro  7.2  /  Alb  3.8  /  TBili  <0.2<L>  /  DBili  x   /  AST  40<H>  /  ALT  16  /  AlkPhos  78  09-25    PT/INR - ( 25 Sep 2021 16:37 )   PT: 13.0 sec;   INR: 1.13 ratio         PTT - ( 26 Sep 2021 09:57 )  PTT:83.4 sec  CARDIAC MARKERS ( 26 Sep 2021 10:00 )  x     / 1.51 ng/mL / x     / x     / x      CARDIAC MARKERS ( 26 Sep 2021 01:20 )  x     / 1.72 ng/mL / x     / x     / x      CARDIAC MARKERS ( 25 Sep 2021 16:37 )  x     / 1.41 ng/mL / x     / x     / x            Echo pending        RADIOLOGY & ADDITIONAL TESTS:                    
cc: cp     interval hx : patient seen and eval. c/o mild  pleuritic cp  overnight. also noted to be mild hypotensive on , received ivf 500 bolus. denies any dizziness or palpitations.     Vital Signs Last 24 Hrs  T(C): 36.5 (27 Sep 2021 09:13), Max: 36.7 (26 Sep 2021 16:30)  T(F): 97.7 (27 Sep 2021 09:13), Max: 98.1 (26 Sep 2021 16:30)  HR: 75 (27 Sep 2021 09:13) (59 - 86)  BP: 87/61 (27 Sep 2021 09:13) (81/49 - 93/61)  BP(mean): 60 (27 Sep 2021 02:27) (60 - 62)  RR: 18 (27 Sep 2021 09:13) (18 - 18)  SpO2: 97% (27 Sep 2021 09:13) (97% - 99%)      Physical Exam:  Constitutional: NAD, awake and alert, well-developed  Neck: Soft and supple, No LAD   Respiratory: cta b/l no wheezing no rhonchi  Cardiovascular: +s1/s2 RRR no edema b/l le  Gastrointestinal: soft nt nd bs+  Vascular: 2+ peripheral pulses, no calf tenderness   Neurological: A/O x 3, no focal deficits  Musculoskeletal: 5/5 strength b/l upper and lower extremities  Skin: Warm, dry, well perfused, right groin hematoma noted from prior cath insertion site                           12.2   7.65  )-----------( 299      ( 27 Sep 2021 09:49 )             35.5   09-27    141  |  108<H>  |  8.9  ----------------------------<  93  4.0   |  20.0<L>  |  0.60    Ca    8.6      27 Sep 2021 08:18  Mg     1.9     09-27    TPro  6.5<L>  /  Alb  3.4  /  TBili  <0.2<L>  /  DBili  x   /  AST  22  /  ALT  13  /  AlkPhos  77  09-27    
                                                               [place] [facility]                                                               Office: [address] [address]                                                              Telephone: [phone]. Fax:[phone]                                                                             PROGRESS NOTE  Reason for follow up: Recent MI  Overnight: No new events.   Update: 9/27: Pt denies palpitations, SOB. Patient c/o midsternal chest pain with associated "tingling" sensation bilateral arms, legs, and wornsening blurry vision. Plan for CT Head-no contrast. Possible repeat cath this admission. Repeat EKG and Trop in AM. Tele-SB/SA 50-80s, occasional Bigemny. Preliminary reading of echo shows EF 40-45%, official reading pending. Cont Lopressor 12.5mg BID. Start Lisinopril 2.5mg. Unable to add Imdur d/t BP. Echo-EF 50-55%, normal RV size and fx., RA/LA normal size and fx., mild MVR, no aortic valve stenosis, no evidence of pericardial effusion.       Subjective: No new events    	  Vitals:  T(C): 36.5 (09-27-21 @ 09:13), Max: 36.7 (09-26-21 @ 16:30)  HR: 80 (09-27-21 @ 13:18) (59 - 86)  BP: 109/72 (09-27-21 @ 13:18) (81/49 - 109/72)  RR: 18 (09-27-21 @ 13:18) (18 - 18)  SpO2: 98% (09-27-21 @ 13:18) (97% - 99%)    I&O's Summary    Weight (kg): 75.75 (09-25 @ 17:30)      PHYSICAL EXAM:  Appearance: Comfortable. No acute distress  HEENT:  Head and neck: Atraumatic. Normocephalic.  Normal oral mucosa, PERRL, Neck is supple. No JVD, No carotid bruit.   Neurologic: A & O x 3, no focal deficits. EOMI.  Lymphatic: No cervical lymphadenopathy  Cardiovascular: Normal S1 S2, No murmur, rubs/gallops. No JVD, No edema  Respiratory: Lungs clear to auscultation  Gastrointestinal:  Soft, Non-tender, + BS  Lower Extremities: No edema  Psychiatry: Patient is calm. No agitation. Mood & affect appropriate  Skin: No rashes/ ecchymoses/cyanosis/ulcers visualized on the face, hands or feet.      CURRENT MEDICATIONS:  metoprolol tartrate 12.5 milliGRAM(s) Oral two times a day  ARIPiprazole  diVALproex ER  diVALproex ER  mirtazapine  pantoprazole    Tablet  atorvastatin  ascorbic acid  aspirin enteric coated  clopidogrel Tablet  heparin  Infusion.  potassium chloride    Tablet ER  zinc sulfate      DIAGNOSTIC TESTING:    [ ] Echocardiogram: < from: TTE Echo Complete w/o Contrast w/ Doppler (09.27.21 @ 10:19) >  Summary:   1. Endocardial visualization was enhanced with intravenous echo contrast.   2. Normal global left ventricular systolic function.   3. Left ventricular ejection fraction, by visual estimation, is 50 to 55%.   4. Normal right ventricular size and function.   5. The right atrium is normal in size.   6. The left atrium is normal in size.   7. Mild thickening of the anterior and posterior mitral valve leaflets.   8. Mild mitral valve regurgitation.   9. No aortic valve stenosis.  10. There is no evidence of pericardial effusion.  11. Recommend clinical correlation with the above findings.      OTHER: 	    CT:< from: CT Angio Chest PE Protocol w/ IV Cont (09.25.21 @ 22:35) >  IMPRESSION:    No evidence for pulmonary embolism or other acute findings.    3 mm nodules in the bilateral upper lobes.    Preliminary report was given by the overnight radiologist      LABS:	 	  CARDIAC MARKERS ( 27 Sep 2021 08:18 )  x     / 1.70 ng/mL / x     / x     / x      p-BNP 27 Sep 2021 08:18: x    , CARDIAC MARKERS ( 26 Sep 2021 10:00 )  x     / 1.51 ng/mL / x     / x     / x      p-BNP 26 Sep 2021 10:00: x    , CARDIAC MARKERS ( 26 Sep 2021 01:20 )  x     / 1.72 ng/mL / x     / x     / x      p-BNP 26 Sep 2021 01:20: x    , CARDIAC MARKERS ( 25 Sep 2021 16:37 )  x     / 1.41 ng/mL / x     / x     / x      p-BNP 25 Sep 2021 16:37: 1177 pg/mL                          12.2   7.65  )-----------( 299      ( 27 Sep 2021 09:49 )             35.5     09-27    141  |  108<H>  |  8.9  ----------------------------<  93  4.0   |  20.0<L>  |  0.60    Ca    8.6      27 Sep 2021 08:18  Mg     1.9     09-27    TPro  6.5<L>  /  Alb  3.4  /  TBili  <0.2<L>  /  DBili  x   /  AST  22  /  ALT  13  /  AlkPhos  77  09-27    proBNP: Serum Pro-Brain Natriuretic Peptide: 1177 pg/mL (09-25 @ 16:37)    Lipid Profile: Date: 09-27 @ 08:18  Total cholesterol 157; Direct LDL: --; HDL: 43; Triglycerides:102      TELEMETRY: SB/SA 50-80s, occasional Bigemny        
cc: cp     interval hx : patient seen and eval. c/o mild cp / pressure. somewhat increased with deep inspiration.  denies any dizziness or palpitations     Vital Signs Last 24 Hrs  T(C): 36.4 (26 Sep 2021 08:20), Max: 37.2 (26 Sep 2021 02:17)  T(F): 97.6 (26 Sep 2021 08:20), Max: 98.9 (26 Sep 2021 02:17)  HR: 82 (26 Sep 2021 12:09) (73 - 82)  BP: 98/69 (26 Sep 2021 12:09) (80/50 - 105/65)  BP(mean): --  RR: 20 (26 Sep 2021 08:20) (16 - 20)  SpO2: 96% (26 Sep 2021 08:20) (96% - 100%)    Physical Exam:  Constitutional: NAD, awake and alert, well-developed  Neck: Soft and supple, No LAD   Respiratory: cta b/l no wheezing no rhonchi  Cardiovascular: +s1/s2 RRR no edema b/l le  Gastrointestinal: soft nt nd bs+  Vascular: 2+ peripheral pulses, no calf tenderness   Neurological: A/O x 3, no focal deficits  Musculoskeletal: 5/5 strength b/l upper and lower extremities  Skin: Warm, dry, well perfused, right groin hematoma noted from prior cath insertion site                             12.4   7.50  )-----------( 285      ( 26 Sep 2021 10:02 )             35.4   09-26    137  |  106  |  9.2  ----------------------------<  91  4.3   |  21.0<L>  |  0.59    Ca    8.6      26 Sep 2021 10:00    TPro  7.2  /  Alb  3.8  /  TBili  <0.2<L>  /  DBili  x   /  AST  40<H>  /  ALT  16  /  AlkPhos  78  09-25    MEDICATIONS  (STANDING):  ARIPiprazole 20 milliGRAM(s) Oral at bedtime  ascorbic acid 500 milliGRAM(s) Oral daily  aspirin enteric coated 81 milliGRAM(s) Oral daily  clopidogrel Tablet 75 milliGRAM(s) Oral daily  diVALproex  milliGRAM(s) Oral at bedtime  diVALproex  milliGRAM(s) Oral daily  heparin  Infusion.  Unit(s)/Hr (14 mL/Hr) IV Continuous <Continuous>  isosorbide   mononitrate ER Tablet (IMDUR) 30 milliGRAM(s) Oral daily  metoprolol tartrate 12.5 milliGRAM(s) Oral two times a day  mirtazapine 30 milliGRAM(s) Oral at bedtime  nicotine -  14 mG/24Hr(s) Patch 1 Patch Transdermal daily  pantoprazole    Tablet 40 milliGRAM(s) Oral before breakfast  potassium chloride    Tablet ER 40 milliEquivalent(s) Oral once  zinc sulfate 220 milliGRAM(s) Oral daily    MEDICATIONS  (PRN):  acetaminophen   Tablet .. 650 milliGRAM(s) Oral every 6 hours PRN Temp greater or equal to 38C (100.4F), Mild Pain (1 - 3)  ALBUTerol    90 MICROgram(s) HFA Inhaler 2 Puff(s) Inhalation every 6 hours PRN Shortness of Breath and/or Wheezing  benzonatate 100 milliGRAM(s) Oral three times a day PRN Cough  heparin   Injectable 6500 Unit(s) IV Push every 6 hours PRN For aPTT less than 40  heparin   Injectable 3000 Unit(s) IV Push every 6 hours PRN For aPTT between 40 - 57  hydrOXYzine hydrochloride 50 milliGRAM(s) Oral every 6 hours PRN Anxiety      
cc: cp     interval hx : patient seen and eval. comfortable. c/o  b/l upper and lower ext numbness and blurry vision  yesterday but now states its resolved. no motor weakness.     Vital Signs Last 24 Hrs  T(C): 37 (28 Sep 2021 09:52), Max: 37.1 (28 Sep 2021 04:43)  T(F): 98.6 (28 Sep 2021 09:52), Max: 98.8 (28 Sep 2021 04:43)  HR: 69 (28 Sep 2021 09:52) (69 - 80)  BP: 87/63 (28 Sep 2021 09:52) (87/63 - 109/72)  BP(mean): --  RR: 20 (28 Sep 2021 09:52) (18 - 20)  SpO2: 94% (28 Sep 2021 09:52) (94% - 98%)    Physical Exam:  Constitutional: NAD, awake and alert, well-developed  Neck: Soft and supple, No LAD   Respiratory: cta b/l no wheezing no rhonchi  Cardiovascular: +s1/s2 RRR no edema b/l le  Gastrointestinal: soft nt nd bs+  Vascular: 2+ peripheral pulses, no calf tenderness   Neurological: A/O x 3, no focal deficits noted , sensory grossly intact.   Musculoskeletal: 5/5 strength b/l upper and lower extremities  Skin: Warm, dry, well perfused, right groin hematoma noted from prior cath insertion site                            12.1   9.03  )-----------( 311      ( 28 Sep 2021 08:30 )             34.7   09-28    139  |  106  |  8.2  ----------------------------<  81  4.2   |  21.0<L>  |  0.62    Ca    8.8      28 Sep 2021 08:30  Mg     2.0     09-28    TPro  6.5<L>  /  Alb  3.4  /  TBili  <0.2<L>  /  DBili  x   /  AST  22  /  ALT  13  /  AlkPhos  77  09-27

## 2021-09-29 ENCOUNTER — TRANSCRIPTION ENCOUNTER (OUTPATIENT)
Age: 46
End: 2021-09-29

## 2021-09-29 LAB
CULTURE RESULTS: SIGNIFICANT CHANGE UP
SPECIMEN SOURCE: SIGNIFICANT CHANGE UP

## 2021-10-04 ENCOUNTER — INPATIENT (INPATIENT)
Facility: HOSPITAL | Age: 46
LOS: 1 days | Discharge: HOME | DRG: 280 | End: 2021-10-06
Attending: HOSPITALIST | Admitting: HOSPITALIST
Payer: MEDICAID

## 2021-10-04 ENCOUNTER — APPOINTMENT (OUTPATIENT)
Dept: CARDIOLOGY | Facility: CLINIC | Age: 46
End: 2021-10-04
Payer: MEDICAID

## 2021-10-04 ENCOUNTER — NON-APPOINTMENT (OUTPATIENT)
Age: 46
End: 2021-10-04

## 2021-10-04 VITALS
OXYGEN SATURATION: 99 % | BODY MASS INDEX: 29.77 KG/M2 | SYSTOLIC BLOOD PRESSURE: 88 MMHG | WEIGHT: 168 LBS | HEART RATE: 74 BPM | HEIGHT: 63 IN | DIASTOLIC BLOOD PRESSURE: 56 MMHG | TEMPERATURE: 98.4 F

## 2021-10-04 VITALS
HEART RATE: 82 BPM | DIASTOLIC BLOOD PRESSURE: 68 MMHG | WEIGHT: 167.99 LBS | TEMPERATURE: 99 F | SYSTOLIC BLOOD PRESSURE: 100 MMHG | RESPIRATION RATE: 17 BRPM | HEIGHT: 63 IN | OXYGEN SATURATION: 98 %

## 2021-10-04 VITALS — DIASTOLIC BLOOD PRESSURE: 62 MMHG | SYSTOLIC BLOOD PRESSURE: 94 MMHG

## 2021-10-04 DIAGNOSIS — Z30.8 ENCOUNTER FOR OTHER CONTRACEPTIVE MANAGEMENT: Chronic | ICD-10-CM

## 2021-10-04 DIAGNOSIS — Z90.721 ACQUIRED ABSENCE OF OVARIES, UNILATERAL: Chronic | ICD-10-CM

## 2021-10-04 DIAGNOSIS — I24.9 ACUTE ISCHEMIC HEART DISEASE, UNSPECIFIED: ICD-10-CM

## 2021-10-04 DIAGNOSIS — Z90.89 ACQUIRED ABSENCE OF OTHER ORGANS: Chronic | ICD-10-CM

## 2021-10-04 DIAGNOSIS — Z87.891 PERSONAL HISTORY OF NICOTINE DEPENDENCE: ICD-10-CM

## 2021-10-04 DIAGNOSIS — Z87.09 PERSONAL HISTORY OF OTHER DISEASES OF THE RESPIRATORY SYSTEM: ICD-10-CM

## 2021-10-04 LAB
ALBUMIN SERPL ELPH-MCNC: 3.8 G/DL — SIGNIFICANT CHANGE UP (ref 3.3–5.2)
ALP SERPL-CCNC: 78 U/L — SIGNIFICANT CHANGE UP (ref 40–120)
ALT FLD-CCNC: 13 U/L — SIGNIFICANT CHANGE UP
ANION GAP SERPL CALC-SCNC: 12 MMOL/L — SIGNIFICANT CHANGE UP (ref 5–17)
APTT BLD: 30.9 SEC — SIGNIFICANT CHANGE UP (ref 27.5–35.5)
AST SERPL-CCNC: 16 U/L — SIGNIFICANT CHANGE UP
BASOPHILS # BLD AUTO: 0.04 K/UL — SIGNIFICANT CHANGE UP (ref 0–0.2)
BASOPHILS NFR BLD AUTO: 0.4 % — SIGNIFICANT CHANGE UP (ref 0–2)
BILIRUB SERPL-MCNC: <0.2 MG/DL — LOW (ref 0.4–2)
BLD GP AB SCN SERPL QL: SIGNIFICANT CHANGE UP
BUN SERPL-MCNC: 13.5 MG/DL — SIGNIFICANT CHANGE UP (ref 8–20)
CALCIUM SERPL-MCNC: 8.8 MG/DL — SIGNIFICANT CHANGE UP (ref 8.6–10.2)
CHLORIDE SERPL-SCNC: 103 MMOL/L — SIGNIFICANT CHANGE UP (ref 98–107)
CO2 SERPL-SCNC: 25 MMOL/L — SIGNIFICANT CHANGE UP (ref 22–29)
CREAT SERPL-MCNC: 0.71 MG/DL — SIGNIFICANT CHANGE UP (ref 0.5–1.3)
EOSINOPHIL # BLD AUTO: 0.1 K/UL — SIGNIFICANT CHANGE UP (ref 0–0.5)
EOSINOPHIL NFR BLD AUTO: 1.1 % — SIGNIFICANT CHANGE UP (ref 0–6)
GLUCOSE SERPL-MCNC: 85 MG/DL — SIGNIFICANT CHANGE UP (ref 70–99)
HCT VFR BLD CALC: 35.5 % — SIGNIFICANT CHANGE UP (ref 34.5–45)
HGB BLD-MCNC: 11.7 G/DL — SIGNIFICANT CHANGE UP (ref 11.5–15.5)
IMM GRANULOCYTES NFR BLD AUTO: 1.4 % — SIGNIFICANT CHANGE UP (ref 0–1.5)
INR BLD: 1 RATIO — SIGNIFICANT CHANGE UP (ref 0.88–1.16)
LYMPHOCYTES # BLD AUTO: 3.01 K/UL — SIGNIFICANT CHANGE UP (ref 1–3.3)
LYMPHOCYTES # BLD AUTO: 32.7 % — SIGNIFICANT CHANGE UP (ref 13–44)
MCHC RBC-ENTMCNC: 32.1 PG — SIGNIFICANT CHANGE UP (ref 27–34)
MCHC RBC-ENTMCNC: 33 GM/DL — SIGNIFICANT CHANGE UP (ref 32–36)
MCV RBC AUTO: 97.3 FL — SIGNIFICANT CHANGE UP (ref 80–100)
MONOCYTES # BLD AUTO: 0.69 K/UL — SIGNIFICANT CHANGE UP (ref 0–0.9)
MONOCYTES NFR BLD AUTO: 7.5 % — SIGNIFICANT CHANGE UP (ref 2–14)
NEUTROPHILS # BLD AUTO: 5.23 K/UL — SIGNIFICANT CHANGE UP (ref 1.8–7.4)
NEUTROPHILS NFR BLD AUTO: 56.9 % — SIGNIFICANT CHANGE UP (ref 43–77)
PLATELET # BLD AUTO: 290 K/UL — SIGNIFICANT CHANGE UP (ref 150–400)
POTASSIUM SERPL-MCNC: 4.4 MMOL/L — SIGNIFICANT CHANGE UP (ref 3.5–5.3)
POTASSIUM SERPL-SCNC: 4.4 MMOL/L — SIGNIFICANT CHANGE UP (ref 3.5–5.3)
PROT SERPL-MCNC: 6.3 G/DL — LOW (ref 6.6–8.7)
PROTHROM AB SERPL-ACNC: 11.6 SEC — SIGNIFICANT CHANGE UP (ref 10.6–13.6)
RAPID RVP RESULT: DETECTED
RBC # BLD: 3.65 M/UL — LOW (ref 3.8–5.2)
RBC # FLD: 13.5 % — SIGNIFICANT CHANGE UP (ref 10.3–14.5)
SARS-COV-2 RNA SPEC QL NAA+PROBE: DETECTED
SODIUM SERPL-SCNC: 140 MMOL/L — SIGNIFICANT CHANGE UP (ref 135–145)
TROPONIN T SERPL-MCNC: 0.02 NG/ML — SIGNIFICANT CHANGE UP (ref 0–0.06)
WBC # BLD: 9.2 K/UL — SIGNIFICANT CHANGE UP (ref 3.8–10.5)
WBC # FLD AUTO: 9.2 K/UL — SIGNIFICANT CHANGE UP (ref 3.8–10.5)

## 2021-10-04 PROCEDURE — 93010 ELECTROCARDIOGRAM REPORT: CPT

## 2021-10-04 PROCEDURE — 99285 EMERGENCY DEPT VISIT HI MDM: CPT

## 2021-10-04 PROCEDURE — 99223 1ST HOSP IP/OBS HIGH 75: CPT

## 2021-10-04 PROCEDURE — 71046 X-RAY EXAM CHEST 2 VIEWS: CPT | Mod: 26

## 2021-10-04 PROCEDURE — 93000 ELECTROCARDIOGRAM COMPLETE: CPT

## 2021-10-04 RX ORDER — ATORVASTATIN CALCIUM 80 MG/1
40 TABLET, FILM COATED ORAL AT BEDTIME
Refills: 0 | Status: DISCONTINUED | OUTPATIENT
Start: 2021-10-04 | End: 2021-10-06

## 2021-10-04 RX ORDER — NITROGLYCERIN 6.5 MG
0.4 CAPSULE, EXTENDED RELEASE ORAL
Refills: 0 | Status: DISCONTINUED | OUTPATIENT
Start: 2021-10-04 | End: 2021-10-06

## 2021-10-04 RX ORDER — CLOPIDOGREL BISULFATE 75 MG/1
75 TABLET, FILM COATED ORAL DAILY
Refills: 0 | Status: DISCONTINUED | OUTPATIENT
Start: 2021-10-05 | End: 2021-10-06

## 2021-10-04 RX ORDER — HEPARIN SODIUM 5000 [USP'U]/ML
INJECTION INTRAVENOUS; SUBCUTANEOUS
Qty: 25000 | Refills: 0 | Status: DISCONTINUED | OUTPATIENT
Start: 2021-10-04 | End: 2021-10-05

## 2021-10-04 RX ORDER — METRONIDAZOLE 7.5 MG/G
0.75 GEL VAGINAL
Qty: 1 | Refills: 1 | Status: DISCONTINUED | COMMUNITY
Start: 2020-11-12 | End: 2021-10-04

## 2021-10-04 RX ORDER — DIVALPROEX SODIUM 500 MG/1
500 TABLET, DELAYED RELEASE ORAL AT BEDTIME
Refills: 0 | Status: DISCONTINUED | OUTPATIENT
Start: 2021-10-04 | End: 2021-10-06

## 2021-10-04 RX ORDER — SODIUM CHLORIDE 9 MG/ML
3 INJECTION INTRAMUSCULAR; INTRAVENOUS; SUBCUTANEOUS EVERY 8 HOURS
Refills: 0 | Status: DISCONTINUED | OUTPATIENT
Start: 2021-10-04 | End: 2021-10-06

## 2021-10-04 RX ORDER — ACETAMINOPHEN 500 MG
650 TABLET ORAL EVERY 6 HOURS
Refills: 0 | Status: DISCONTINUED | OUTPATIENT
Start: 2021-10-04 | End: 2021-10-06

## 2021-10-04 RX ORDER — PANTOPRAZOLE SODIUM 20 MG/1
40 TABLET, DELAYED RELEASE ORAL
Refills: 0 | Status: DISCONTINUED | OUTPATIENT
Start: 2021-10-04 | End: 2021-10-06

## 2021-10-04 RX ORDER — ARIPIPRAZOLE 15 MG/1
30 TABLET ORAL DAILY
Refills: 0 | Status: DISCONTINUED | OUTPATIENT
Start: 2021-10-04 | End: 2021-10-06

## 2021-10-04 RX ORDER — ASPIRIN/CALCIUM CARB/MAGNESIUM 324 MG
81 TABLET ORAL DAILY
Refills: 0 | Status: DISCONTINUED | OUTPATIENT
Start: 2021-10-04 | End: 2021-10-06

## 2021-10-04 RX ORDER — LANOLIN ALCOHOL/MO/W.PET/CERES
3 CREAM (GRAM) TOPICAL AT BEDTIME
Refills: 0 | Status: DISCONTINUED | OUTPATIENT
Start: 2021-10-04 | End: 2021-10-06

## 2021-10-04 RX ORDER — FLUCONAZOLE 150 MG/1
150 TABLET ORAL DAILY
Qty: 1 | Refills: 1 | Status: DISCONTINUED | COMMUNITY
Start: 2020-11-12 | End: 2021-10-04

## 2021-10-04 RX ORDER — ZINC SULFATE TAB 220 MG (50 MG ZINC EQUIVALENT) 220 (50 ZN) MG
220 TAB ORAL DAILY
Refills: 0 | Status: DISCONTINUED | OUTPATIENT
Start: 2021-10-04 | End: 2021-10-06

## 2021-10-04 RX ORDER — HEPARIN SODIUM 5000 [USP'U]/ML
3000 INJECTION INTRAVENOUS; SUBCUTANEOUS EVERY 6 HOURS
Refills: 0 | Status: DISCONTINUED | OUTPATIENT
Start: 2021-10-04 | End: 2021-10-05

## 2021-10-04 RX ORDER — METOPROLOL TARTRATE 50 MG
12.5 TABLET ORAL
Refills: 0 | Status: DISCONTINUED | OUTPATIENT
Start: 2021-10-04 | End: 2021-10-06

## 2021-10-04 RX ORDER — HEPARIN SODIUM 5000 [USP'U]/ML
6500 INJECTION INTRAVENOUS; SUBCUTANEOUS EVERY 6 HOURS
Refills: 0 | Status: DISCONTINUED | OUTPATIENT
Start: 2021-10-04 | End: 2021-10-05

## 2021-10-04 RX ORDER — ONDANSETRON 8 MG/1
4 TABLET, FILM COATED ORAL EVERY 8 HOURS
Refills: 0 | Status: DISCONTINUED | OUTPATIENT
Start: 2021-10-04 | End: 2021-10-06

## 2021-10-04 NOTE — ED ADULT TRIAGE NOTE - SPO2 (%)
38yo female with pmh HTN presents with SS chest tightness occasional burning last night and again tonight. Occasional sob. Pt had some cough yesterday. Pt reports last week had fever and body aches but resolved. + sick contacts. denies cardiac history.    No fever/chills, No photophobia/eye pain/changes in vision, No ear pain/sore throat/dysphagia, +chest pain, no palpitations, + SOB/cough, no wheeze/stridor, No abdominal pain, No N/V/D, no dysuria/frequency/discharge, No neck/back pain, no rash, no changes in neurological status/function. 98

## 2021-10-04 NOTE — H&P ADULT - HISTORY OF PRESENT ILLNESS
47 yo female, with hx of Asthma, Bipolar, Lupus, Recently Diagnosed 9/17 with covid-19, initially presented to [facility] for cp, found to have MI s/p PCI to LAD, LV apical thrombus, left AMA came to [facility], and was initiated on triple therapy for recent PCI and possible intracardiac thrombus. Echo was with normal EF. She was noted to have Utox pos for Cocain/THC/Amphetamines. She ended up being DCd with o/p f/u and was sent in today by Cardiology after she f/u and complains of SSCP. States her symptoms are random, denies SOB/Diaphoresis. Denies using illicit drugs. No fever, chills, focal weakness.

## 2021-10-04 NOTE — CONSULT NOTE ADULT - SUBJECTIVE AND OBJECTIVE BOX
Patient is a 46y old  Female who presents with a chief complaint of chest pain.     HPI:  Patient is well known to me from prior admission. She signed out of [facility] on 21 to come to our hospital since her daughter works here.   45 yo female, pmh of Asthma, Bipolar, Lupus, Recently Diagnosed  with covid19, initially presented to [facility] for cp , found to have MI s/p PCI to LAD, LV apical thrombus, was on heparin.     Chest PE Protocol w/ IV Cont (21 @ 22:35) >No evidence for pulmonary  embolism or other acute findings.3 mm nodules in the bilateral upper lobes.  Pt was having atypical cp during the hospitalization that improved. She was on Imdur but due to low BP it was stopped.  She had a TTE which I reveiwed with AS hypokinesis, and possible laminated apical thrombus.  She was sent home on triple therapy with recent AMI.   Pt has been having more cp, left sided, no radiaiton, lasting 5 minutes, multiple times per day. She saw me in the offcie and was sent to ED.  EKG with NSR, od AS MI with deep  T wave inversion in v1-v3.   She is compliant with meds.       PAST MEDICAL & SURGICAL HISTORY:  Lupus  Bipolar 1 disorder, depressed  Asthma  Ectopic pregnancyx 3   x 6  History of heart murmur in childhood  Palpitations  Thyroid nodule 2016  GERD (gastroesophageal reflux disease)  H/O oophorectomy right  S/P tonsillectomy  Encounter for post Essure sterilization check      Allergies  No Known Allergies      MEDICATIONS  (STANDING):  heparin  Infusion.  Unit(s)/Hr (14 mL/Hr) IV Continuous <Continuous>  MEDICATIONS  (PRN):  heparin   Injectable 6500 Unit(s) IV Push every 6 hours PRN For aPTT less than 40  heparin   Injectable 3000 Unit(s) IV Push every 6 hours PRN For aPTT between 40 - 57    home meds:   Abilify  asa 81  plavix 75  eliquis 5 bid  depakote 250 am and 500 pm  lopressor 12.5 mg bid  protonix 40 mg daily  remeron    FAMILY HISTORY:  Family history of cancer in father  Family history of hypothyroidism  FH: HTN (hypertension) (Mother)  FH: hyperlipidemia (Mother)    SOCIAL HISTORY:  Tobacco: former tobacco   ALCOHOL: no  Illicit drugs: Denies    REVIEW OF SYSTEMS:  CONSTITUTIONAL: No fever, weight loss, or fatigue  EYES: No eye pain, visual disturbances, or discharge  ENMT:  No difficulty hearing, tinnitus, vertigo; No sinus or throat pain  NECK: No pain or stiffness  RESPIRATORY+ Shortness of Breath  CARDIOVASCULAR: No  palpitations, syncope or presyncope , dizziness, or leg swelling. see hpi  GASTROINTESTINAL: No abdominal pain , nausea, vomiting, or hematemesis; No diarrhea or constipation. No melena or hematochezia.  GENITOURINARY: No dysuria, polyuria, hematuria, or incontinence  NEUROLOGICAL: No headaches, memory loss, loss of strength, numbness, or tremors  SKIN: No itching, burning, rashes,  bruising   MUSCULOSKELETAL: No joint pain or swelling; No muscle, back, or extremity pain  PSYCHIATRIC: No depression, anxiety, mood swings, or difficulty sleeping  HEME/LYMPH: No easy bruising, or bleeding   ALLERY AND IMMUNOLOGIC: No hives or eczema	    Vital Signs Last 24 Hrs  T(C): 37 (04 Oct 2021 19:49), Max: 37 (04 Oct 2021 17:51)  T(F): 98.6 (04 Oct 2021 19:49), Max: 98.6 (04 Oct 2021 17:51)  HR: 74 (04 Oct 2021 19:49) (74 - 82)  BP: 100/60 (04 Oct 2021 19:49) (100/60 - 117/63)  BP(mean): --  RR: 18 (04 Oct 2021 19:49) (17 - 18)  SpO2: 100% (04 Oct 2021 19:49) (98% - 100%)  Daily Height in cm: 160.02 (04 Oct 2021 17:51)      PHYSICAL EXAM:  Appearance: Normal, well nourished, in NAD	  HEENT:   Normal oral mucosa, PERRL, EOMI, sclera non-icteric	  NecK: No JVD, no bruits, no LAD  Cardiovascular: Normal S1 S2, No JVD, No cardiac murmurs, No peripheral edema  Respiratory: Lungs clear to auscultation	  Psychiatry: A & O x 3, Mood & affect appropriate  Gastrointestinal:  Soft, Non-tender, + BS, no bruits	  Neurologic: Grossly non-focal with full strength in all four extremities  Extremities: Normal range of motion, No clubbing, no cyanosis  Vascular: Peripheral pulses palpable    LABS:                        11.7   9.20  )-----------( 290      ( 04 Oct 2021 20:23 )             35.5         PT/INR - ( 04 Oct 2021 20:23 )   PT: 11.6 sec;   INR: 1.00 ratio     PTT - ( 04 Oct 2021 20:23 )  PTT:30.9 sec

## 2021-10-04 NOTE — ED ADULT NURSE REASSESSMENT NOTE - NS ED NURSE REASSESS COMMENT FT1
report given to rn at 21:30. pt moved to I 4. rr even and unlabored. continued cardiac monitoring in place.  rn made aware of transfer of care.

## 2021-10-04 NOTE — CONSULT NOTE ADULT - ASSESSMENT
45 yo female with recent AMI, anterior wall, although TTE does not indicate RWMA, LV septum is hypokinetic to apex with possible laminated thrombus  pt is complaining of cp, multiple episodes, similar to but less in intensity than time she had MI  She has COVID diagnosed over 2 weeks ago  No new EKG changes  Labs including CE are pending  Spoke to ED attending, Dr Sewell, plan to hold eliquis tonight. last dose was in AM  Start heparin drip  Plan for cath in AM  resume home meds.   CXR is pending  We will follow with you.

## 2021-10-04 NOTE — ED ADULT NURSE REASSESSMENT NOTE - NS ED NURSE REASSESS COMMENT FT1
assumed care of pt at 19:30. report received from day shift rn. charting as noted. rr even and unlabored. anox3. vss. able to move all extremities well. no apparent distress. denies chest pain and sob. iv intact. continued cardiac monitoring in place. pt educated on plan of care, pt able to successfully teach back plan of care to RN, RN will continue to reeducate pt during hospital stay.

## 2021-10-04 NOTE — ED PROVIDER NOTE - OBJECTIVE STATEMENT
46F h/o SLE, asthma, bipolar cad with recent admission for LAD stent and apical intracardiac thrombus (done at [facility] but left AMA and came to [facility]) p/w continued non-exertional intermittent mid sternal CP since discharge from [facility] 9/28/21. Saw [facility] today and instructed to come to ED for admission for cardiac cath, will hold eliquis and start heparin gtt. Denies fever, SOB, nausea, vomiting. Endorses occasional whole body tingling with the CP.

## 2021-10-04 NOTE — ED ADULT TRIAGE NOTE - CHIEF COMPLAINT QUOTE
Patient states that she was admitted to the ED last week and discharged on Tuesday from [facility] after having cardiac stents placed . Pt states that she has been having chest pain everyday since she was discharged. Pt told to come to the ED by her MD

## 2021-10-04 NOTE — HISTORY OF PRESENT ILLNESS
[FreeTextEntry1] : pt seen and examined in the office\par MI with PCI to LAD and possible apical thrombus\par complaining of cp, not related to activity and lasting about 5 minutes\par Multiple episodes.\par Pt will go to [facility] ED\par She refused to go by ambulance. \par Spoke to Dr Altamirano and gave turnover.

## 2021-10-04 NOTE — ED PROVIDER NOTE - CLINICAL SUMMARY MEDICAL DECISION MAKING FREE TEXT BOX
Patient with recent LAD stent, apical thrombus sent in by cardiology for admission for cardiac cath. Plan for labs, ecg, heparin gtt, admission.

## 2021-10-04 NOTE — H&P ADULT - ASSESSMENT
45 y/o female with intermittent CP, hx of recent PCI, cardiac thrombus, Lupus, Bipolar, PSA, GERD, Asthma    Chest Pain:  -No evidence of ACS at this time, trop neg/ekg with no acute changes  -Monitor on tele  -check Utox  -Cont. o/p CAD regimen   -Hold eliquis, heparin drip per Cardiology pending [facility]  -PRN Nitrates, no need to recheck A1c/FLP as we recently admitted  -No evidence of CHF  -Consider GI eval if inpatient ischemic w/u neg with hx of GERD  -PPI    Lupus:  -Cont. o/p regimen  -O/P Rheum f/u    Bipolar:  -denies SI/HI  -Cont. o/p regimen    PSA:  -Denies use of illicit drugs  -Check Utox    Asthma:  -No wheezing on exam, not hypoxic

## 2021-10-04 NOTE — H&P ADULT - NSICDXPASTMEDICALHX_GEN_ALL_CORE_FT
PAST MEDICAL HISTORY:   x 6    Asthma     Bipolar 1 disorder, depressed     CAD (coronary atherosclerotic disease)     Ectopic pregnancy x 3    GERD (gastroesophageal reflux disease)     History of heart murmur in childhood     Lupus     Palpitations     Thyroid nodule 2016

## 2021-10-04 NOTE — ED PROVIDER NOTE - PHYSICAL EXAMINATION
Gen: Well appearing in NAD  Head: NC/AT  Neck: trachea midline  Resp:  No distress, CTAB  CV: RRR  GI: soft, NTND  Ext: no deformities, no LE edema, no calf ttp  Neuro:  A&O appears non focal  Skin:  Warm and dry as visualized  Psych:  Normal affect and mood

## 2021-10-04 NOTE — ED ADULT NURSE NOTE - CHIEF COMPLAINT QUOTE
Patient states that she was admitted to the ED last week and discharged on Tuesday from [facility] after having cardiac stents placed . Pt states that she has been having chest pain everyday since she was discharged. Pt told to come to the ED by her MD

## 2021-10-05 ENCOUNTER — TRANSCRIPTION ENCOUNTER (OUTPATIENT)
Age: 46
End: 2021-10-05

## 2021-10-05 LAB
ANION GAP SERPL CALC-SCNC: 16 MMOL/L — SIGNIFICANT CHANGE UP (ref 5–17)
APTT BLD: 105.6 SEC — HIGH (ref 27.5–35.5)
APTT BLD: 71 SEC — HIGH (ref 27.5–35.5)
BUN SERPL-MCNC: 15.1 MG/DL — SIGNIFICANT CHANGE UP (ref 8–20)
CALCIUM SERPL-MCNC: 8.7 MG/DL — SIGNIFICANT CHANGE UP (ref 8.6–10.2)
CHLORIDE SERPL-SCNC: 102 MMOL/L — SIGNIFICANT CHANGE UP (ref 98–107)
CO2 SERPL-SCNC: 21 MMOL/L — LOW (ref 22–29)
COVID-19 SPIKE DOMAIN AB INTERP: POSITIVE
COVID-19 SPIKE DOMAIN ANTIBODY RESULT: >250 U/ML — HIGH
CREAT SERPL-MCNC: 0.64 MG/DL — SIGNIFICANT CHANGE UP (ref 0.5–1.3)
GLUCOSE SERPL-MCNC: 96 MG/DL — SIGNIFICANT CHANGE UP (ref 70–99)
HCT VFR BLD CALC: 36.2 % — SIGNIFICANT CHANGE UP (ref 34.5–45)
HGB BLD-MCNC: 12 G/DL — SIGNIFICANT CHANGE UP (ref 11.5–15.5)
MCHC RBC-ENTMCNC: 32.3 PG — SIGNIFICANT CHANGE UP (ref 27–34)
MCHC RBC-ENTMCNC: 33.1 GM/DL — SIGNIFICANT CHANGE UP (ref 32–36)
MCV RBC AUTO: 97.6 FL — SIGNIFICANT CHANGE UP (ref 80–100)
PLATELET # BLD AUTO: 272 K/UL — SIGNIFICANT CHANGE UP (ref 150–400)
POTASSIUM SERPL-MCNC: 4.5 MMOL/L — SIGNIFICANT CHANGE UP (ref 3.5–5.3)
POTASSIUM SERPL-SCNC: 4.5 MMOL/L — SIGNIFICANT CHANGE UP (ref 3.5–5.3)
RBC # BLD: 3.71 M/UL — LOW (ref 3.8–5.2)
RBC # FLD: 13.4 % — SIGNIFICANT CHANGE UP (ref 10.3–14.5)
SARS-COV-2 IGG+IGM SERPL QL IA: >250 U/ML — HIGH
SARS-COV-2 IGG+IGM SERPL QL IA: POSITIVE
SODIUM SERPL-SCNC: 139 MMOL/L — SIGNIFICANT CHANGE UP (ref 135–145)
TROPONIN T SERPL-MCNC: <0.01 NG/ML — SIGNIFICANT CHANGE UP (ref 0–0.06)
WBC # BLD: 10.31 K/UL — SIGNIFICANT CHANGE UP (ref 3.8–10.5)
WBC # FLD AUTO: 10.31 K/UL — SIGNIFICANT CHANGE UP (ref 3.8–10.5)

## 2021-10-05 PROCEDURE — 99233 SBSQ HOSP IP/OBS HIGH 50: CPT

## 2021-10-05 PROCEDURE — 99232 SBSQ HOSP IP/OBS MODERATE 35: CPT

## 2021-10-05 PROCEDURE — 93454 CORONARY ARTERY ANGIO S&I: CPT | Mod: 26

## 2021-10-05 RX ORDER — APIXABAN 2.5 MG/1
5 TABLET, FILM COATED ORAL EVERY 12 HOURS
Refills: 0 | Status: DISCONTINUED | OUTPATIENT
Start: 2021-10-05 | End: 2021-10-06

## 2021-10-05 RX ADMIN — ATORVASTATIN CALCIUM 40 MILLIGRAM(S): 80 TABLET, FILM COATED ORAL at 22:28

## 2021-10-05 RX ADMIN — Medication 650 MILLIGRAM(S): at 16:25

## 2021-10-05 RX ADMIN — CLOPIDOGREL BISULFATE 75 MILLIGRAM(S): 75 TABLET, FILM COATED ORAL at 12:41

## 2021-10-05 RX ADMIN — Medication 650 MILLIGRAM(S): at 15:24

## 2021-10-05 RX ADMIN — APIXABAN 5 MILLIGRAM(S): 2.5 TABLET, FILM COATED ORAL at 22:28

## 2021-10-05 RX ADMIN — Medication 3 MILLIGRAM(S): at 01:04

## 2021-10-05 RX ADMIN — ONDANSETRON 4 MILLIGRAM(S): 8 TABLET, FILM COATED ORAL at 12:41

## 2021-10-05 RX ADMIN — Medication 12.5 MILLIGRAM(S): at 17:12

## 2021-10-05 RX ADMIN — DIVALPROEX SODIUM 500 MILLIGRAM(S): 500 TABLET, DELAYED RELEASE ORAL at 01:05

## 2021-10-05 RX ADMIN — SODIUM CHLORIDE 3 MILLILITER(S): 9 INJECTION INTRAMUSCULAR; INTRAVENOUS; SUBCUTANEOUS at 05:14

## 2021-10-05 RX ADMIN — HEPARIN SODIUM 1400 UNIT(S)/HR: 5000 INJECTION INTRAVENOUS; SUBCUTANEOUS at 01:25

## 2021-10-05 RX ADMIN — DIVALPROEX SODIUM 500 MILLIGRAM(S): 500 TABLET, DELAYED RELEASE ORAL at 22:28

## 2021-10-05 RX ADMIN — SODIUM CHLORIDE 3 MILLILITER(S): 9 INJECTION INTRAMUSCULAR; INTRAVENOUS; SUBCUTANEOUS at 21:44

## 2021-10-05 RX ADMIN — ZINC SULFATE TAB 220 MG (50 MG ZINC EQUIVALENT) 220 MILLIGRAM(S): 220 (50 ZN) TAB at 12:41

## 2021-10-05 RX ADMIN — HEPARIN SODIUM 1400 UNIT(S)/HR: 5000 INJECTION INTRAVENOUS; SUBCUTANEOUS at 08:50

## 2021-10-05 RX ADMIN — Medication 81 MILLIGRAM(S): at 12:41

## 2021-10-05 RX ADMIN — ATORVASTATIN CALCIUM 40 MILLIGRAM(S): 80 TABLET, FILM COATED ORAL at 01:04

## 2021-10-05 RX ADMIN — PANTOPRAZOLE SODIUM 40 MILLIGRAM(S): 20 TABLET, DELAYED RELEASE ORAL at 08:13

## 2021-10-05 RX ADMIN — SODIUM CHLORIDE 3 MILLILITER(S): 9 INJECTION INTRAMUSCULAR; INTRAVENOUS; SUBCUTANEOUS at 15:21

## 2021-10-05 RX ADMIN — ARIPIPRAZOLE 30 MILLIGRAM(S): 15 TABLET ORAL at 12:41

## 2021-10-05 NOTE — DISCHARGE NOTE PROVIDER - NSDCCPTREATMENT_GEN_ALL_CORE_FT
PRINCIPAL PROCEDURE  Procedure: Left heart cardiac cath  Findings and Treatment: patent LAD stent

## 2021-10-05 NOTE — DISCHARGE NOTE PROVIDER - NSDCCPCAREPLAN_GEN_ALL_CORE_FT
PRINCIPAL DISCHARGE DIAGNOSIS  Diagnosis: ACS (acute coronary syndrome)  Assessment and Plan of Treatment: ruled out.

## 2021-10-05 NOTE — DISCHARGE NOTE PROVIDER - HOSPITAL COURSE
46y/oF PMH recent PCI, cardiac thrombus, lupus, bipolar, GERD, asthma presenting with intermittent chest pain. Monitored on tele, seen by cardiology. s/p C 10/5, revealing patent LAD stent. Pt stable for dc home.     Vital Signs Last 24 Hrs  T(C): 36.6 (06 Oct 2021 05:01), Max: 37.2 (06 Oct 2021 00:07)  T(F): 97.8 (06 Oct 2021 05:01), Max: 98.9 (06 Oct 2021 00:07)  HR: 86 (06 Oct 2021 05:01) (62 - 86)  BP: 96/61 (06 Oct 2021 08:12) (90/62 - 116/67)  BP(mean): --  RR: 18 (06 Oct 2021 05:01) (15 - 20)  SpO2: 99% (06 Oct 2021 05:01) (98% - 100%)    CONSTITUTIONAL: NAD  EYES: +EOMI  CARDIAC: Regular rate, regular rhythm.  normal +S1, S2.   RESPIRATORY: Clear to auscultation bilaterally, no wheezes noted  GASTROINTESTINAL: Abdomen soft, non-tender, no guarding.  NEUROLOGICAL: Alert and oriented, no focal deficits  SKIN: warm, dry, no rashes noted    36minutes spent on discharge

## 2021-10-05 NOTE — DISCHARGE NOTE PROVIDER - CARE PROVIDER_API CALL
Leoncio Vasquez)  Cardiovascular Disease  [address], [place] [address]  Phone: [phone]  Fax: [phone]  Follow Up Time:

## 2021-10-06 ENCOUNTER — TRANSCRIPTION ENCOUNTER (OUTPATIENT)
Age: 46
End: 2021-10-06

## 2021-10-06 VITALS
TEMPERATURE: 99 F | HEART RATE: 71 BPM | DIASTOLIC BLOOD PRESSURE: 61 MMHG | RESPIRATION RATE: 18 BRPM | OXYGEN SATURATION: 99 % | SYSTOLIC BLOOD PRESSURE: 96 MMHG

## 2021-10-06 LAB
HCT VFR BLD CALC: 38 % — SIGNIFICANT CHANGE UP (ref 34.5–45)
HGB BLD-MCNC: 12.7 G/DL — SIGNIFICANT CHANGE UP (ref 11.5–15.5)
MCHC RBC-ENTMCNC: 32.3 PG — SIGNIFICANT CHANGE UP (ref 27–34)
MCHC RBC-ENTMCNC: 33.4 GM/DL — SIGNIFICANT CHANGE UP (ref 32–36)
MCV RBC AUTO: 96.7 FL — SIGNIFICANT CHANGE UP (ref 80–100)
PLATELET # BLD AUTO: 281 K/UL — SIGNIFICANT CHANGE UP (ref 150–400)
RBC # BLD: 3.93 M/UL — SIGNIFICANT CHANGE UP (ref 3.8–5.2)
RBC # FLD: 13.3 % — SIGNIFICANT CHANGE UP (ref 10.3–14.5)
WBC # BLD: 8.28 K/UL — SIGNIFICANT CHANGE UP (ref 3.8–10.5)
WBC # FLD AUTO: 8.28 K/UL — SIGNIFICANT CHANGE UP (ref 3.8–10.5)

## 2021-10-06 PROCEDURE — 93005 ELECTROCARDIOGRAM TRACING: CPT

## 2021-10-06 PROCEDURE — 0225U NFCT DS DNA&RNA 21 SARSCOV2: CPT

## 2021-10-06 PROCEDURE — 85027 COMPLETE CBC AUTOMATED: CPT

## 2021-10-06 PROCEDURE — 96374 THER/PROPH/DIAG INJ IV PUSH: CPT

## 2021-10-06 PROCEDURE — 85730 THROMBOPLASTIN TIME PARTIAL: CPT

## 2021-10-06 PROCEDURE — 86769 SARS-COV-2 COVID-19 ANTIBODY: CPT

## 2021-10-06 PROCEDURE — 86901 BLOOD TYPING SEROLOGIC RH(D): CPT

## 2021-10-06 PROCEDURE — 93454 CORONARY ARTERY ANGIO S&I: CPT

## 2021-10-06 PROCEDURE — 80053 COMPREHEN METABOLIC PANEL: CPT

## 2021-10-06 PROCEDURE — 36415 COLL VENOUS BLD VENIPUNCTURE: CPT

## 2021-10-06 PROCEDURE — 86850 RBC ANTIBODY SCREEN: CPT

## 2021-10-06 PROCEDURE — 80048 BASIC METABOLIC PNL TOTAL CA: CPT

## 2021-10-06 PROCEDURE — 84484 ASSAY OF TROPONIN QUANT: CPT

## 2021-10-06 PROCEDURE — C1769: CPT

## 2021-10-06 PROCEDURE — 99239 HOSP IP/OBS DSCHRG MGMT >30: CPT

## 2021-10-06 PROCEDURE — 99232 SBSQ HOSP IP/OBS MODERATE 35: CPT

## 2021-10-06 PROCEDURE — 99285 EMERGENCY DEPT VISIT HI MDM: CPT | Mod: 25

## 2021-10-06 PROCEDURE — 86900 BLOOD TYPING SEROLOGIC ABO: CPT

## 2021-10-06 PROCEDURE — 85610 PROTHROMBIN TIME: CPT

## 2021-10-06 PROCEDURE — C1887: CPT

## 2021-10-06 PROCEDURE — 71046 X-RAY EXAM CHEST 2 VIEWS: CPT

## 2021-10-06 PROCEDURE — 85025 COMPLETE CBC W/AUTO DIFF WBC: CPT

## 2021-10-06 PROCEDURE — C1894: CPT

## 2021-10-06 RX ORDER — LANOLIN ALCOHOL/MO/W.PET/CERES
1 CREAM (GRAM) TOPICAL
Qty: 0 | Refills: 0 | DISCHARGE
Start: 2021-10-06

## 2021-10-06 RX ADMIN — CLOPIDOGREL BISULFATE 75 MILLIGRAM(S): 75 TABLET, FILM COATED ORAL at 12:04

## 2021-10-06 RX ADMIN — PANTOPRAZOLE SODIUM 40 MILLIGRAM(S): 20 TABLET, DELAYED RELEASE ORAL at 05:43

## 2021-10-06 RX ADMIN — Medication 30 MILLILITER(S): at 04:41

## 2021-10-06 RX ADMIN — APIXABAN 5 MILLIGRAM(S): 2.5 TABLET, FILM COATED ORAL at 12:11

## 2021-10-06 RX ADMIN — Medication 81 MILLIGRAM(S): at 12:04

## 2021-10-06 RX ADMIN — ARIPIPRAZOLE 30 MILLIGRAM(S): 15 TABLET ORAL at 12:05

## 2021-10-06 RX ADMIN — Medication 30 MILLILITER(S): at 08:28

## 2021-10-06 RX ADMIN — SODIUM CHLORIDE 3 MILLILITER(S): 9 INJECTION INTRAMUSCULAR; INTRAVENOUS; SUBCUTANEOUS at 05:43

## 2021-10-06 RX ADMIN — ZINC SULFATE TAB 220 MG (50 MG ZINC EQUIVALENT) 220 MILLIGRAM(S): 220 (50 ZN) TAB at 12:04

## 2021-10-06 NOTE — PROGRESS NOTE ADULT - ATTENDING COMMENTS
Above noted. Pt is doing betttr. Cath results DW Dr. Cleary.  LAD stent is patent  Resume eliquis tomorrow  Pt can be dc home in am.
Above noted.  pt was seen and examined  Not interested in cardiac rehab. Patent LAD stent. No more CP  PT is on triple therapy, in 2 weeks can dc asa and cont with plavix and eliquis.  cont with other meds  fu in office 2-4 weeks   advised to see GI

## 2021-10-06 NOTE — PROGRESS NOTE ADULT - SUBJECTIVE AND OBJECTIVE BOX
YOVANI RUSHING    [id number]    46y      Female    CC: chest pain     INTERVAL HPI/OVERNIGHT EVENTS: pt seen and examined.     REVIEW OF SYSTEMS:    CONSTITUTIONAL: No fever, weight loss  RESPIRATORY: No cough, wheezing, hemoptysis; No shortness of breath  CARDIOVASCULAR: No palpitations  GASTROINTESTINAL: No abdominal or epigastric pain. No vomiting  NEUROLOGICAL: No headaches    Vital Signs Last 24 Hrs  T(C): 36.9 (05 Oct 2021 07:54), Max: 37 (04 Oct 2021 17:51)  T(F): 98.4 (05 Oct 2021 07:54), Max: 98.6 (04 Oct 2021 17:51)  HR: 71 (05 Oct 2021 07:54) (71 - 82)  BP: 95/57 (05 Oct 2021 07:54) (95/57 - 117/63)  BP(mean): 73 (04 Oct 2021 22:57) (73 - 73)  RR: 18 (05 Oct 2021 07:54) (17 - 18)  SpO2: 98% (05 Oct 2021 07:54) (98% - 100%)    PHYSICAL EXAM:    GENERAL: NAD  HEENT: +EOMI  NECK: soft, supple  CHEST/LUNG: Clear to auscultation bilaterally  HEART: S1S2+, Regular rate and rhythm; No murmurs, rubs, or gallops  ABDOMEN: Soft, Nontender, Nondistended; Bowel sounds present  SKIN: warm, dry  NEURO: AAOX3, no focal deficits    LABS:                        12.0   10.31 )-----------( 272      ( 05 Oct 2021 08:03 )             36.2     10-05    139  |  102  |  15.1  ----------------------------<  96  4.5   |  21.0<L>  |  0.64    Ca    8.7      05 Oct 2021 08:03    TPro  6.3<L>  /  Alb  3.8  /  TBili  <0.2<L>  /  DBili  x   /  AST  16  /  ALT  13  /  AlkPhos  78  10-04    PT/INR - ( 04 Oct 2021 20:23 )   PT: 11.6 sec;   INR: 1.00 ratio         PTT - ( 05 Oct 2021 08:03 )  PTT:71.0 sec        MEDICATIONS  (STANDING):  ARIPiprazole 30 milliGRAM(s) Oral daily  aspirin enteric coated 81 milliGRAM(s) Oral daily  atorvastatin 40 milliGRAM(s) Oral at bedtime  clopidogrel Tablet 75 milliGRAM(s) Oral daily  diVALproex  milliGRAM(s) Oral at bedtime  heparin  Infusion.  Unit(s)/Hr (14 mL/Hr) IV Continuous <Continuous>  metoprolol tartrate 12.5 milliGRAM(s) Oral two times a day  pantoprazole    Tablet 40 milliGRAM(s) Oral before breakfast  sodium chloride 0.9% lock flush 3 milliLiter(s) IV Push every 8 hours  zinc sulfate 220 milliGRAM(s) Oral daily    MEDICATIONS  (PRN):  acetaminophen   Tablet .. 650 milliGRAM(s) Oral every 6 hours PRN Temp greater or equal to 38C (100.4F), Mild Pain (1 - 3)  aluminum hydroxide/magnesium hydroxide/simethicone Suspension 30 milliLiter(s) Oral every 4 hours PRN Dyspepsia  heparin   Injectable 6500 Unit(s) IV Push every 6 hours PRN For aPTT less than 40  heparin   Injectable 3000 Unit(s) IV Push every 6 hours PRN For aPTT between 40 - 57  melatonin 3 milliGRAM(s) Oral at bedtime PRN Insomnia  nitroglycerin     SubLingual 0.4 milliGRAM(s) SubLingual every 5 minutes PRN Chest Pain  ondansetron Injectable 4 milliGRAM(s) IV Push every 8 hours PRN Nausea and/or Vomiting      RADIOLOGY & ADDITIONAL TESTS:  
                                                                        Department of Cardiology                                                                  [facility]/[place] [address] [id number]                                                            Telephone: [phone]. Fax:[phone]                                                                             Pre- Procedure Note/Cardiology F/U    Cardiology following for admission with recurrent CP in the setting of recent MI and PCI to LAD, + LV thrombus  No acute event overnight, currently in NAD, tnl neg X 2  Plan: LHC to be performed by Dr. Cleary      Symptoms:        Angina (Class): III       Ischemic Symptoms: chest pain    Heart Failure: mild LV systolic dysfunction         Assessment of LVEF:       EF: 50-55%       Assessed by: TTE       Date: 9/27/21    Prior Cardiac Interventions:  Recent PCI to LAD in the setting of AMI at OSH         Echo 9/27/21:    Summary:   1. Endocardial visualization was enhanced with intravenous echo contrast.   2. Normal global left ventricular systolic function.   3. Left ventricular ejection fraction, by visual estimation, is 50 to 55%.   4. Normal right ventricular size and function.   5. The right atrium is normal in size.   6. The left atrium is normal in size.   7. Mild thickening of the anterior and posterior mitral valve leaflets.   8. Mild mitral valve regurgitation.   9. No aortic valve stenosis.  10. There is no evidence of pericardial effusion.  11. Recommend clinical correlation with the above findings.         Risk Assessments:  ASA: 3  Mallampati: 2  Bleeding Risk: 3.3%  Creatinine: 0.6  GFR: 106    Associated Risk Factors:        Cerebrovascular Disease: N/A       Chronic Lung Disease: N/A       Peripheral Arterial Disease: N/A       Chronic Kidney Disease (if yes, what is GFR): N/A       Uncontrolled Diabetes (if yes, what is HgbA1C or FBS): N/A       Poorly Controlled Hypertension (if yes, what is SBP): N/A       Morbid Obesity (if yes, what is BMI): N/A       History of Recent Ventricular Arrhythmia: N/A       Inability to Ambulate Safely: N/A       Need for Therapeutic Anticoagulation: N/A       Antiplatelet or Contrast Allergy: N/A    Antianginal Therapies:        Beta Blockers:  metoprolol       Calcium Channel Blockers:        Long Acting Nitrates:        Ranexa:     	  MEDICATIONS:  metoprolol tartrate 12.5 milliGRAM(s) Oral two times a day  nitroglycerin     SubLingual 0.4 milliGRAM(s) SubLingual every 5 minutes PRN  acetaminophen   Tablet .. 650 milliGRAM(s) Oral every 6 hours PRN  ARIPiprazole 30 milliGRAM(s) Oral daily  diVALproex  milliGRAM(s) Oral at bedtime  melatonin 3 milliGRAM(s) Oral at bedtime PRN  ondansetron Injectable 4 milliGRAM(s) IV Push every 8 hours PRN  aluminum hydroxide/magnesium hydroxide/simethicone Suspension 30 milliLiter(s) Oral every 4 hours PRN  pantoprazole    Tablet 40 milliGRAM(s) Oral before breakfast  atorvastatin 40 milliGRAM(s) Oral at bedtime  aspirin enteric coated 81 milliGRAM(s) Oral daily  clopidogrel Tablet 75 milliGRAM(s) Oral daily  heparin   Injectable 6500 Unit(s) IV Push every 6 hours PRN  heparin   Injectable 3000 Unit(s) IV Push every 6 hours PRN  heparin  Infusion.  Unit(s)/Hr IV Continuous <Continuous>  sodium chloride 0.9% lock flush 3 milliLiter(s) IV Push every 8 hours  zinc sulfate 220 milliGRAM(s) Oral daily      ROS: as stated above, otherwise negative    PHYSICAL EXAM:  Constitutional: A & O x 3  HEENT:   Normal oral mucosa, PERRL, EOMI	  Cardiovascular: Normal S1 S2, No JVD, No murmurs, No edema  Respiratory: Lungs clear to auscultation	  Gastrointestinal:  Soft, Non-tender, + BS	  Skin: No rashes, No ecchymoses, No cyanosis  Neurologic: Non-focal  Extremities: Normal range of motion, No clubbing, cyanosis or edema  Vascular: Peripheral pulses palpable 2+ bilaterally      T(C): 36.6 (10-05-21 @ 16:02), Max: 37 (10-04-21 @ 19:49)  HR: 81 (10-05-21 @ 18:10) (62 - 82)  BP: 93/54 (10-05-21 @ 18:10) (93/54 - 117/63)  RR: 16 (10-05-21 @ 18:10) (16 - 18)  SpO2: 98% (10-05-21 @ 18:10) (97% - 100%)    Daily Height in cm: 160.02 (05 Oct 2021 08:16)      TELEMETRY: SR 80's 	      ECG: SR with ante/lat TWI 80BPM  	    LABS:	 	                          12.0   10.31 )-----------( 272      ( 05 Oct 2021 08:03 )             36.2     10-05    139  |  102  |  15.1  ----------------------------<  96  4.5   |  21.0<L>  |  0.64    Ca    8.7      05 Oct 2021 08:03    TPro  6.3<L>  /  Alb  3.8  /  TBili  <0.2<L>  /  DBili  x   /  AST  16  /  ALT  13  /  AlkPhos  78  10-04      Tnl: neg X 2      A/P: 47 yo female with hx of Asthma, Bipolar, Lupus, Recently Diagnosed 9/17/21 with covid-19, presented to [facility] for cp, found to have MI s/p PCI to LAD and + LV apical thrombus, left AMA and came to [facility], and was initiated on triple therapy for recent PCI and possible intracardiac thrombus. She was noted to have Utox pos for Cocaine/THC/Amphetamines, she was ultimately D/C'd and had OP f/u, where she endorsed intermittent episodes SSCP and was subsequently instructed to present to the ED, tnl neg X 2, no new acute ischemic ECG changes, now plan for LHC to be performed by Dr. Cleary.     #Recurrent chest pain with tnl neg X 2 and no new acute ischemic ECG changes, recent MI with PCI to LAD and +LV apical thrombus at OSH  -plan for LHC via RA vs FA  -cont DAPT with ASA 81mg and Plavix 75mg po daily (taken today prior to cath)  -Hold Eliquis for LHC (LD 10/4/21 in am)  -stop heparin gtt on call to procedure room  -Cont BB metoprolol 25mg po q12hr  -Cont statin with Lipitor 40mg po qHS  -ECG and Labs reviewed  -patient seen and examined  -confirmed appropriate NPO duration  -procedure discussed with patient; risks and benefits explained, questions answered  -consent obtained by attending IC    #HLD  -Cont Lipitor 40mg po qhs  -Low fat/cholesterol diet  -Routine Lipid panels to ensure at goal     #PPX  -On heparin gtt    #Full Code    #Dispo  -LHC +/- PCI today    
Nurse Practitioner Progress note:     INTERVAL HISTORY: 46 year old female with recent LAD stent p/w chest pain.     MEDICATIONS:  metoprolol tartrate 12.5 milliGRAM(s) Oral two times a day  nitroglycerin     SubLingual 0.4 milliGRAM(s) SubLingual every 5 minutes PRN  acetaminophen   Tablet .. 650 milliGRAM(s) Oral every 6 hours PRN  ARIPiprazole 30 milliGRAM(s) Oral daily  diVALproex  milliGRAM(s) Oral at bedtime  melatonin 3 milliGRAM(s) Oral at bedtime PRN  ondansetron Injectable 4 milliGRAM(s) IV Push every 8 hours PRN  aluminum hydroxide/magnesium hydroxide/simethicone Suspension 30 milliLiter(s) Oral every 4 hours PRN  pantoprazole    Tablet 40 milliGRAM(s) Oral before breakfast  atorvastatin 40 milliGRAM(s) Oral at bedtime  apixaban 5 milliGRAM(s) Oral every 12 hours  aspirin enteric coated 81 milliGRAM(s) Oral daily  clopidogrel Tablet 75 milliGRAM(s) Oral daily  sodium chloride 0.9% lock flush 3 milliLiter(s) IV Push every 8 hours  zinc sulfate 220 milliGRAM(s) Oral daily      TELEMETRY: NSR 70 bpm    T(C): 36.6 (10-05-21 @ 18:10), Max: 36.9 (10-05-21 @ 07:54)  HR: 78 (10-05-21 @ 19:45) (62 - 82)  BP: 96/66 (10-05-21 @ 19:45) (93/54 - 116/67)  RR: 16 (10-05-21 @ 19:45) (16 - 18)  SpO2: 100% (10-05-21 @ 19:45) (97% - 100%)  Wt(kg): --    PHYSICAL EXAM:  Appearance: Normal	  HEENT:   Normal oral mucosa, PERRL  Cardiovascular: Normal S1 S2, No JVD, No murmurs, No edema  Respiratory: Lungs clear to auscultation	  Psychiatry: A & O x 3, Mood & affect appropriate  Gastrointestinal:  Soft, Non-tender, + BS	  Neurologic: Non-focal, A&O X3.  No neuro deficits  Procedure Site: Right radial band in place.  Site benign.  No bleeding/hematoma/ecchymosis.  + palp radial pulse      PROCEDURE RESULTS: S/P LHC which revealed patent LAD stent.    ASSESSMENT/PLAN: 	  -Transfer to bed when criteria met  -Radial precautions  -D/C radial band in 1 hour  -Resume home meds  -Resume eliquis at 2200  -Site check in AM  -F/U Dr. Vasquez    
      [facility] [id number]            [facility]                          [address] [place] [address]                       Phone: [phone]. Fax:[phone]                      ________________________________________________    HPI:  45 yo female, with hx of Asthma, Bipolar, Lupus, Recently Diagnosed  with covid-19, initially presented to [facility] for cp, found to have MI s/p PCI to LAD, LV apical thrombus, left AMA came to [facility], and was initiated on triple therapy for recent PCI and possible intracardiac thrombus. Echo was with normal EF. She was noted to have Utox pos for Cocain/THC/Amphetamines. She ended up being DCd with o/p f/u and was sent in today by Cardiology after she f/u and complains of SSCP. States her symptoms are random, denies SOB/Diaphoresis. Denies using illicit drugs. No fever, chills, focal weakness.      (04 Oct 2021 22:57)    HOME MEDICATIONS:  Abilify 20 mg oral tablet: 1 tab(s) orally once a day (at bedtime) (04 Oct 2021 19:05)  acetaminophen 325 mg oral tablet: 2 tab(s) orally every 6 hours, As needed, Temp greater or equal to 38C (100.4F), Mild Pain (1 - 3) (04 Oct 2021 19:05)  aspirin 81 mg oral tablet, chewable: 1 tab(s) orally once a day (04 Oct 2021 21:14)  Atarax 50 mg oral tablet: 1 tab(s) orally 4 times a day (04 Oct 2021 19:05)  atorvastatin 40 mg oral tablet: 1 tab(s) orally once a day (04 Oct 2021 21:14)  benzonatate 100 mg oral capsule: 1 cap(s) orally 3 times a day (04 Oct 2021 21:14)  Depakote  mg oral tablet, extended release: 2 tab(s) orally once a day (at bedtime) (04 Oct 2021 19:05)  Eliquis 5 mg oral tablet: 1 tab(s) orally 2 times a day (04 Oct 2021 21:14)  Lopressor: 12.5 milligram(s) orally (04 Oct 2021 21:14)  mirtazapine 30 mg oral tablet, disintegratin tab(s) orally once a day (at bedtime) (04 Oct 2021 19:05)  Plavix 75 mg oral tablet: 1 tab(s) orally once a day (04 Oct 2021 21:14)  Protonix 40 mg oral delayed release tablet: 1 tab(s) orally once a day (04 Oct 2021 21:14)  zinc sulfate 220 mg oral capsule: 1 cap(s) orally once a day (04 Oct 2021 19:05)      ROS: All review of systems negative unless indicated otherwise below.                         PHYSICAL EXAM:    GENERAL: NAD  NECK: Supple, No JVD  NERVOUS SYSTEM:  Alert & Oriented X3, non focal neuro exam.   CHEST/LUNG: clear lungs, No rales, rhonchi, wheezing, or rubs  HEART: Regular rate and rhythm; s1 and s2 auscultated, No murmurs, rubs, or gallops  ABDOMEN: Soft, Nontender, Nondistended; Bowel sounds present and normoactive.   EXTREMITIES:  2+ Peripheral Pulses, No clubbing, cyanosis, or edema  CATH SITE: Right wrist benign s/p cath.  No bleeding, no ecchymosis, no hematoma. Extremity Warm to touch, with palpable distal pulses, and brisk capillary refill.        Vital Signs Last 24 Hrs  T(C): 36.6 (06 Oct 2021 05:01), Max: 37.2 (06 Oct 2021 00:07)  T(F): 97.8 (06 Oct 2021 05:01), Max: 98.9 (06 Oct 2021 00:07)  HR: 86 (06 Oct 2021 05:01) (62 - 86)  BP: 96/61 (06 Oct 2021 08:12) (90/62 - 116/67)  BP(mean): --  RR: 18 (06 Oct 2021 05:01) (15 - 20)  SpO2: 99% (06 Oct 2021 05:01) (98% - 100%)                                                    LAB RESULTS                 COMPLETE BLOOD COUNT( 06 Oct 2021 07:31 )                            12.7 g/dL  8.28 K/uL )---------------( 281 K/uL                        38.0 %      Automated Differential     Auto Basophil # - X      Auto Basophil % - X      Auto Eosinophil # - X      Auto Eosinophil % - X      Auto Immature Granulocyte # - X      Auto Immature Granulocyte % - X      Auto Lymphocyte # - X      Auto Lymphocyte % - X      Auto Monocyte # - X      Auto Monocyte % - X      Auto Neutrophil # - X      Auto Neutrophil % - X                                      CHEMISTRY                 Basic Metabolic Panel (10-05-21 @ 08:03)    139  |  102  |  15.1  ----------------------------<  96  4.5   |  21.0<L>  |  0.64    Ca    8.7      05 Oct 2021 08:03  Mg     2.0     09                    Liver Functions (10-04-21 @ 20:23))  TPro  6.3  /  Alb  3.8  /  TBili  <0.2  /  DBili  x   /  AST  16  /  ALT  13  /  AlkPhos  78     PT/INR/PTT ( 05 Oct 2021 17:12 )                        :                       :      X            :       105.6                 .        .                   .              .           .       X           .                                                                 Cardiac Enzymes   ( 05 Oct 2021 08:03 )  Troponin T  <0.01,  CPK  X    , CKMB  X    , BNP X        , ( 04 Oct 2021 20:23 )  Troponin T  0.02 ,  CPK  X    , CKMB  X    , BNP X        , ( 28 Sep 2021 08:30 )  Troponin T  1.16<H>,  CPK  X    , CKMB  X    , BNP X                                   Current Admission Active Medications    acetaminophen   Tablet .. 650 milliGRAM(s) Oral every 6 hours PRN Temp greater or equal to 38C (100.4F), Mild Pain (1 - 3)  aluminum hydroxide/magnesium hydroxide/simethicone Suspension 30 milliLiter(s) Oral every 4 hours PRN Dyspepsia  apixaban 5 milliGRAM(s) Oral every 12 hours  ARIPiprazole 30 milliGRAM(s) Oral daily  aspirin enteric coated 81 milliGRAM(s) Oral daily  atorvastatin 40 milliGRAM(s) Oral at bedtime  clopidogrel Tablet 75 milliGRAM(s) Oral daily  diVALproex  milliGRAM(s) Oral at bedtime  melatonin 3 milliGRAM(s) Oral at bedtime PRN Insomnia  metoprolol tartrate 12.5 milliGRAM(s) Oral two times a day  nitroglycerin     SubLingual 0.4 milliGRAM(s) SubLingual every 5 minutes PRN Chest Pain  ondansetron Injectable 4 milliGRAM(s) IV Push every 8 hours PRN Nausea and/or Vomiting  pantoprazole    Tablet 40 milliGRAM(s) Oral before breakfast  sodium chloride 0.9% lock flush 3 milliLiter(s) IV Push every 8 hours  zinc sulfate 220 milliGRAM(s) Oral daily                          CARDIOLOGY RESULTS: Official Report/Preliminary Verbal Reports  < from: Cardiac Catheterization (10.05.21 @ 19:11) >  CORONARY VESSELS: The coronary circulation is right dominant.  LM:   --  LM: Normal.  LAD:   --  LAD: There was a 0 % stenosis in-stent.  CX:   --  Circumflex: Normal.  RCA:   --  RCA: Angiography showed mild atherosclerosis with no flow  limiting lesions.  COMPLICATIONS: No complications occurred during the cath lab visit.  DIAGNOSTIC IMPRESSIONS: Patent LAD stent.  DIAGNOSTIC RECOMMENDATIONS: Medical management.  Prepared and signed by  Reymundo Cleary MD  Signed 10/05/2021 19:43:16    < end of copied text >

## 2021-10-06 NOTE — DISCHARGE NOTE NURSING/CASE MANAGEMENT/SOCIAL WORK - PATIENT PORTAL LINK FT
You can access the FollowMyHealth Patient Portal offered by [facility] by registering at the following website: [email or web address]. By joining [facility]’s FollowMyHealth portal, you will also be able to view your health information using other applications (apps) compatible with our system.

## 2021-10-06 NOTE — PROGRESS NOTE ADULT - ASSESSMENT
45 yo female with hx of Asthma, Bipolar, Lupus, Recently Diagnosed 9/17/21 with covid-19, presented to [facility] for cp, found to have MI s/p PCI to LAD and + LV apical thrombus, left AMA and came to [facility], and was initiated on triple therapy for recent PCI and possible intracardiac thrombus. She was noted to have Utox pos for Cocaine/THC/Amphetamines, she was ultimately D/C'd and had OP f/u, where she endorsed intermittent episodes SSCP and was subsequently instructed to present to the ED, tnl neg X 2, no new acute ischemic ECG changes, now plan for LHC to be performed by Dr. Cleary.     #Recurrent chest pain with tnl neg X 2 and no new acute ischemic ECG changes, recent MI with PCI to LAD and +LV apical thrombus at OSH  - now s/p LHC showing patent LAD stents   - continue ASA/Plavix, Eliquis, toprol, lipitor   - Diet/lifestyle modifications and medication compliance heavily reinforced   - patient not a candidate for cardiac rehab secondary to: no intervention   - Patient educated about wrist site care, signs and symptoms of complication post procedure, and plan of care moving forward. Patient verbalized understanding with teach-back.   - stable for DC from cardiology standpoint  - f/u outpatient w/ Pedro PIERSON   
46y/oF PMH recent PCI, cardiac thrombus, lupus, bipolar, GERD, asthma presenting with intermittent chest pain     Chest pain   -cont tele monitoring   -plan for LH today 10/5  -cardio recs appreciated   -heparin gtt in preparation for [facility], holding eliquis   -PRN nitrates     Lupus  -cont outpt   -outpt rheum f/u     Bipolar disorder   -denies SI/HI  -cont outpt regimen     Polysubstance abuse   -f/u utox

## 2021-10-10 ENCOUNTER — EMERGENCY (EMERGENCY)
Facility: HOSPITAL | Age: 46
LOS: 1 days | Discharge: HOME | End: 2021-10-10
Attending: EMERGENCY MEDICINE
Payer: MEDICAID

## 2021-10-10 VITALS
RESPIRATION RATE: 18 BRPM | DIASTOLIC BLOOD PRESSURE: 64 MMHG | HEART RATE: 93 BPM | WEIGHT: 167.99 LBS | HEIGHT: 63 IN | SYSTOLIC BLOOD PRESSURE: 93 MMHG | OXYGEN SATURATION: 100 % | TEMPERATURE: 98 F

## 2021-10-10 DIAGNOSIS — Z30.8 ENCOUNTER FOR OTHER CONTRACEPTIVE MANAGEMENT: Chronic | ICD-10-CM

## 2021-10-10 DIAGNOSIS — Z90.89 ACQUIRED ABSENCE OF OTHER ORGANS: Chronic | ICD-10-CM

## 2021-10-10 DIAGNOSIS — Z90.721 ACQUIRED ABSENCE OF OVARIES, UNILATERAL: Chronic | ICD-10-CM

## 2021-10-10 PROCEDURE — 99284 EMERGENCY DEPT VISIT MOD MDM: CPT

## 2021-10-11 ENCOUNTER — EMERGENCY (EMERGENCY)
Facility: HOSPITAL | Age: 46
LOS: 1 days | Discharge: HOME | End: 2021-10-11
Attending: EMERGENCY MEDICINE
Payer: MEDICAID

## 2021-10-11 VITALS
SYSTOLIC BLOOD PRESSURE: 138 MMHG | DIASTOLIC BLOOD PRESSURE: 76 MMHG | RESPIRATION RATE: 16 BRPM | HEIGHT: 63 IN | WEIGHT: 158.07 LBS | HEART RATE: 83 BPM | OXYGEN SATURATION: 98 % | TEMPERATURE: 100 F

## 2021-10-11 DIAGNOSIS — Z90.89 ACQUIRED ABSENCE OF OTHER ORGANS: Chronic | ICD-10-CM

## 2021-10-11 DIAGNOSIS — Z90.721 ACQUIRED ABSENCE OF OVARIES, UNILATERAL: Chronic | ICD-10-CM

## 2021-10-11 DIAGNOSIS — Z30.8 ENCOUNTER FOR OTHER CONTRACEPTIVE MANAGEMENT: Chronic | ICD-10-CM

## 2021-10-11 PROBLEM — I25.10 ATHEROSCLEROTIC HEART DISEASE OF NATIVE CORONARY ARTERY WITHOUT ANGINA PECTORIS: Chronic | Status: ACTIVE | Noted: 2021-10-04

## 2021-10-11 LAB
ALBUMIN SERPL ELPH-MCNC: 3.9 G/DL — SIGNIFICANT CHANGE UP (ref 3.3–5.2)
ALP SERPL-CCNC: 84 U/L — SIGNIFICANT CHANGE UP (ref 40–120)
ALT FLD-CCNC: 5 U/L — SIGNIFICANT CHANGE UP
ANION GAP SERPL CALC-SCNC: 8 MMOL/L — SIGNIFICANT CHANGE UP (ref 5–17)
APTT BLD: 34.1 SEC — SIGNIFICANT CHANGE UP (ref 27.5–35.5)
AST SERPL-CCNC: 13 U/L — SIGNIFICANT CHANGE UP
BASOPHILS # BLD AUTO: 0.03 K/UL — SIGNIFICANT CHANGE UP (ref 0–0.2)
BASOPHILS NFR BLD AUTO: 0.3 % — SIGNIFICANT CHANGE UP (ref 0–2)
BILIRUB SERPL-MCNC: <0.2 MG/DL — LOW (ref 0.4–2)
BUN SERPL-MCNC: 12.5 MG/DL — SIGNIFICANT CHANGE UP (ref 8–20)
CALCIUM SERPL-MCNC: 8.6 MG/DL — SIGNIFICANT CHANGE UP (ref 8.6–10.2)
CHLORIDE SERPL-SCNC: 105 MMOL/L — SIGNIFICANT CHANGE UP (ref 98–107)
CO2 SERPL-SCNC: 26 MMOL/L — SIGNIFICANT CHANGE UP (ref 22–29)
CREAT SERPL-MCNC: 0.76 MG/DL — SIGNIFICANT CHANGE UP (ref 0.5–1.3)
EOSINOPHIL # BLD AUTO: 0.08 K/UL — SIGNIFICANT CHANGE UP (ref 0–0.5)
EOSINOPHIL NFR BLD AUTO: 0.8 % — SIGNIFICANT CHANGE UP (ref 0–6)
GLUCOSE SERPL-MCNC: 84 MG/DL — SIGNIFICANT CHANGE UP (ref 70–99)
HCT VFR BLD CALC: 34.1 % — LOW (ref 34.5–45)
HGB BLD-MCNC: 11.3 G/DL — LOW (ref 11.5–15.5)
IMM GRANULOCYTES NFR BLD AUTO: 0.5 % — SIGNIFICANT CHANGE UP (ref 0–1.5)
INR BLD: 1.03 RATIO — SIGNIFICANT CHANGE UP (ref 0.88–1.16)
LACTATE BLDV-MCNC: 1 MMOL/L — SIGNIFICANT CHANGE UP (ref 0.5–2)
LYMPHOCYTES # BLD AUTO: 2.67 K/UL — SIGNIFICANT CHANGE UP (ref 1–3.3)
LYMPHOCYTES # BLD AUTO: 26.4 % — SIGNIFICANT CHANGE UP (ref 13–44)
MCHC RBC-ENTMCNC: 32.7 PG — SIGNIFICANT CHANGE UP (ref 27–34)
MCHC RBC-ENTMCNC: 33.1 GM/DL — SIGNIFICANT CHANGE UP (ref 32–36)
MCV RBC AUTO: 98.6 FL — SIGNIFICANT CHANGE UP (ref 80–100)
MONOCYTES # BLD AUTO: 0.96 K/UL — HIGH (ref 0–0.9)
MONOCYTES NFR BLD AUTO: 9.5 % — SIGNIFICANT CHANGE UP (ref 2–14)
NEUTROPHILS # BLD AUTO: 6.33 K/UL — SIGNIFICANT CHANGE UP (ref 1.8–7.4)
NEUTROPHILS NFR BLD AUTO: 62.5 % — SIGNIFICANT CHANGE UP (ref 43–77)
PLATELET # BLD AUTO: 283 K/UL — SIGNIFICANT CHANGE UP (ref 150–400)
POTASSIUM SERPL-MCNC: 4.6 MMOL/L — SIGNIFICANT CHANGE UP (ref 3.5–5.3)
POTASSIUM SERPL-SCNC: 4.6 MMOL/L — SIGNIFICANT CHANGE UP (ref 3.5–5.3)
PROT SERPL-MCNC: 6.5 G/DL — LOW (ref 6.6–8.7)
PROTHROM AB SERPL-ACNC: 11.9 SEC — SIGNIFICANT CHANGE UP (ref 10.6–13.6)
RBC # BLD: 3.46 M/UL — LOW (ref 3.8–5.2)
RBC # FLD: 14.1 % — SIGNIFICANT CHANGE UP (ref 10.3–14.5)
SARS-COV-2 RNA SPEC QL NAA+PROBE: SIGNIFICANT CHANGE UP
SODIUM SERPL-SCNC: 139 MMOL/L — SIGNIFICANT CHANGE UP (ref 135–145)
WBC # BLD: 10.12 K/UL — SIGNIFICANT CHANGE UP (ref 3.8–10.5)
WBC # FLD AUTO: 10.12 K/UL — SIGNIFICANT CHANGE UP (ref 3.8–10.5)

## 2021-10-11 PROCEDURE — 90471 IMMUNIZATION ADMIN: CPT

## 2021-10-11 PROCEDURE — 90715 TDAP VACCINE 7 YRS/> IM: CPT

## 2021-10-11 PROCEDURE — 93010 ELECTROCARDIOGRAM REPORT: CPT

## 2021-10-11 PROCEDURE — 99220: CPT

## 2021-10-11 PROCEDURE — 99283 EMERGENCY DEPT VISIT LOW MDM: CPT | Mod: 25

## 2021-10-11 RX ORDER — APIXABAN 2.5 MG/1
5 TABLET, FILM COATED ORAL
Refills: 0 | Status: DISCONTINUED | OUTPATIENT
Start: 2021-10-11 | End: 2021-10-16

## 2021-10-11 RX ORDER — CLOPIDOGREL BISULFATE 75 MG/1
75 TABLET, FILM COATED ORAL DAILY
Refills: 0 | Status: DISCONTINUED | OUTPATIENT
Start: 2021-10-11 | End: 2021-10-16

## 2021-10-11 RX ORDER — ARIPIPRAZOLE 15 MG/1
20 TABLET ORAL AT BEDTIME
Refills: 0 | Status: DISCONTINUED | OUTPATIENT
Start: 2021-10-11 | End: 2021-10-16

## 2021-10-11 RX ORDER — DIVALPROEX SODIUM 500 MG/1
250 TABLET, DELAYED RELEASE ORAL AT BEDTIME
Refills: 0 | Status: DISCONTINUED | OUTPATIENT
Start: 2021-10-11 | End: 2021-10-11

## 2021-10-11 RX ORDER — ACETAMINOPHEN 500 MG
650 TABLET ORAL ONCE
Refills: 0 | Status: COMPLETED | OUTPATIENT
Start: 2021-10-11 | End: 2021-10-11

## 2021-10-11 RX ORDER — AMPICILLIN SODIUM AND SULBACTAM SODIUM 250; 125 MG/ML; MG/ML
3 INJECTION, POWDER, FOR SUSPENSION INTRAMUSCULAR; INTRAVENOUS EVERY 6 HOURS
Refills: 0 | Status: DISCONTINUED | OUTPATIENT
Start: 2021-10-11 | End: 2021-10-16

## 2021-10-11 RX ORDER — AMPICILLIN SODIUM AND SULBACTAM SODIUM 250; 125 MG/ML; MG/ML
3 INJECTION, POWDER, FOR SUSPENSION INTRAMUSCULAR; INTRAVENOUS ONCE
Refills: 0 | Status: COMPLETED | OUTPATIENT
Start: 2021-10-11 | End: 2021-10-11

## 2021-10-11 RX ORDER — ATORVASTATIN CALCIUM 80 MG/1
40 TABLET, FILM COATED ORAL AT BEDTIME
Refills: 0 | Status: DISCONTINUED | OUTPATIENT
Start: 2021-10-11 | End: 2021-10-16

## 2021-10-11 RX ORDER — ASPIRIN/CALCIUM CARB/MAGNESIUM 324 MG
81 TABLET ORAL DAILY
Refills: 0 | Status: DISCONTINUED | OUTPATIENT
Start: 2021-10-11 | End: 2021-10-16

## 2021-10-11 RX ORDER — SODIUM CHLORIDE 9 MG/ML
1000 INJECTION INTRAMUSCULAR; INTRAVENOUS; SUBCUTANEOUS ONCE
Refills: 0 | Status: COMPLETED | OUTPATIENT
Start: 2021-10-11 | End: 2021-10-11

## 2021-10-11 RX ORDER — DIVALPROEX SODIUM 500 MG/1
500 TABLET, DELAYED RELEASE ORAL AT BEDTIME
Refills: 0 | Status: DISCONTINUED | OUTPATIENT
Start: 2021-10-11 | End: 2021-10-16

## 2021-10-11 RX ORDER — METOPROLOL TARTRATE 50 MG
12.5 TABLET ORAL
Refills: 0 | Status: DISCONTINUED | OUTPATIENT
Start: 2021-10-11 | End: 2021-10-16

## 2021-10-11 RX ORDER — TETANUS TOXOID, REDUCED DIPHTHERIA TOXOID AND ACELLULAR PERTUSSIS VACCINE, ADSORBED 5; 2.5; 8; 8; 2.5 [IU]/.5ML; [IU]/.5ML; UG/.5ML; UG/.5ML; UG/.5ML
0.5 SUSPENSION INTRAMUSCULAR ONCE
Refills: 0 | Status: COMPLETED | OUTPATIENT
Start: 2021-10-11 | End: 2021-10-11

## 2021-10-11 RX ORDER — IBUPROFEN 200 MG
600 TABLET ORAL ONCE
Refills: 0 | Status: COMPLETED | OUTPATIENT
Start: 2021-10-11 | End: 2021-10-11

## 2021-10-11 RX ADMIN — ATORVASTATIN CALCIUM 40 MILLIGRAM(S): 80 TABLET, FILM COATED ORAL at 23:28

## 2021-10-11 RX ADMIN — AMPICILLIN SODIUM AND SULBACTAM SODIUM 200 GRAM(S): 250; 125 INJECTION, POWDER, FOR SUSPENSION INTRAMUSCULAR; INTRAVENOUS at 18:14

## 2021-10-11 RX ADMIN — DIVALPROEX SODIUM 500 MILLIGRAM(S): 500 TABLET, DELAYED RELEASE ORAL at 23:28

## 2021-10-11 RX ADMIN — SODIUM CHLORIDE 1000 MILLILITER(S): 9 INJECTION INTRAMUSCULAR; INTRAVENOUS; SUBCUTANEOUS at 17:59

## 2021-10-11 RX ADMIN — Medication 650 MILLIGRAM(S): at 17:54

## 2021-10-11 RX ADMIN — TETANUS TOXOID, REDUCED DIPHTHERIA TOXOID AND ACELLULAR PERTUSSIS VACCINE, ADSORBED 0.5 MILLILITER(S): 5; 2.5; 8; 8; 2.5 SUSPENSION INTRAMUSCULAR at 01:03

## 2021-10-11 RX ADMIN — Medication 650 MILLIGRAM(S): at 01:03

## 2021-10-11 RX ADMIN — APIXABAN 5 MILLIGRAM(S): 2.5 TABLET, FILM COATED ORAL at 23:28

## 2021-10-11 RX ADMIN — Medication 1 TABLET(S): at 01:03

## 2021-10-11 RX ADMIN — Medication 600 MILLIGRAM(S): at 17:54

## 2021-10-11 RX ADMIN — Medication 12.5 MILLIGRAM(S): at 23:29

## 2021-10-11 RX ADMIN — ARIPIPRAZOLE 20 MILLIGRAM(S): 15 TABLET ORAL at 23:29

## 2021-10-11 NOTE — ED ADULT NURSE NOTE - CAS DISCH CONDITION
The patient had SROM with Pitocin augmentation and ultimate vaginal delivery of an 8lb 4oz male infant with APGAR's of 8/9.    Delivery:  Dr. Kirkpatrick  A/P Care:  Dr. Sebastian  
Stable

## 2021-10-11 NOTE — ED STATDOCS - TOBACCO USE
Former smoker Infusion Nursing Note:  Chayo Nathan presents today for Tysabri.    Patient seen by provider today: No   present during visit today: Not Applicable.    Note: Pt states she is doing well, denies any problems with her Tysabri infusions, denies any reason to hold therapy today.    Intravenous Access:  Peripheral IV placed.    Treatment Conditions:  Tysabri pre-infusion checklist completed via touch program.      Post Infusion Assessment:  Patient tolerated infusion without incident.  Patient declined to stay for observation period.  Site patent and intact, free from redness, edema or discomfort.  No evidence of extravasations.  Access discontinued per protocol.       Discharge Plan:   Discharge instructions reviewed with: Patient.  Patient and/or family verbalized understanding of discharge instructions and all questions answered.  Patient discharged in stable condition accompanied by: self.  Departure Mode: Ambulatory.    Marsha Son RN

## 2021-10-11 NOTE — ED CDU PROVIDER INITIAL DAY NOTE - OBJECTIVE STATEMENT
54 y/o female with a PMHx of CAD, s/p stents, Bipolar, GERD, Lupus, Asthma, fibromyalgia, presents to the ED c/o worsening R hand pain s/p house cat bite yesterday. Pt states the cat was stuck in the fence, she tried to help it, but the cat bit her. Pt was seen here last night, was treated with Amoxicillin and received a tetanus shot. Denies fever. Reports body aches. Denies abdominal pain. Smokes marijuana. Occasional EtOH use. Denies illicit drug use. NKDA.

## 2021-10-11 NOTE — ED STATDOCS - PROGRESS NOTE DETAILS
Chandan: Pt feeling better, states that she now has better ROM of her R hand.  Will place in observation for continued IV antibiotics.

## 2021-10-11 NOTE — ED PROVIDER NOTE - OBJECTIVE STATEMENT
45 yo presents PMHx MI s/p stents, CAD, Bipolar, GERD, Lupus, ASthma to ED c/o cat bite x10 hours. Bite to right hand. Patient bitten unknown cat with collar after trying to rescue cat from fence. Tetanus unknown. Patient was COVID+ on 9/17, asymptomatic. 45 yo presents PMHx MI s/p stents, CAD, Bipolar, GERD, Lupus, Asthma to ED c/o cat bite x10 hours. Bite to right hand. Patient bitten unknown cat with collar after trying to rescue cat from fence. Tetanus unknown. Patient was COVID+ on 9/17, asymptomatic.

## 2021-10-11 NOTE — ED STATDOCS - SKIN, MLM
skin normal color for race, warm, dry and intact. skin normal except for changes to RUE as described above

## 2021-10-11 NOTE — ED PROVIDER NOTE - CLINICAL SUMMARY MEDICAL DECISION MAKING FREE TEXT BOX
47 yo presents PMHx MI s/p stents, CAD, Bipolar, GERD, Lupus, Asthma to ED c/o cat bite x10 hours. NVIT, +swelling, FROM. Patient to be discharged. Patient provided verbal/written discharge instructions and strict return precautions to ED for worsening of sxms which may then require IV abx. Patient expresses understanding and agreement.

## 2021-10-11 NOTE — ED PROVIDER NOTE - ATTENDING CONTRIBUTION TO CARE
HPI: 47yo female with h/o myocardial infarction, Coronary Artery Disease, bipolar disorder p/w R hand pain s/p cat bite/scratch 10 hours prior. No fevers/chills, Last tetanus unknown.     PE:  Gen: NAD  Head: NCAT  HEENT: Oral mucosa moist, normal conjunctiva  CV: RRR no murmurs, normal perfusion  Resp: CTA b/l, no wheezing, rales, rhonchi, no respiratory distress  GI: Abd Soft NTND  Neuro: No focal neuro deficits  MSK: FROM all 4 extremities, no deformity  Skin: multiple superficial lacerations to L hand with associated swelling, no erythema/warmth  Psych: Normal affect    MDM: R hand swelling s/p cat bite/scratch, tx with Augmentin, tetanus, dc. No indication for rabies ppx at this time as cat low risk (domestic animal with collar on).  I performed a history and physical exam of the patient and discussed their management with the advanced care provider. I reviewed the advanced care provider's note and agree with the documented findings and plan of care. My medical decision making and objective findings are found above. HPI: 45yo female with h/o myocardial infarction, Coronary Artery Disease, bipolar disorder p/w R hand pain s/p cat bite/scratch 10 hours prior. No fevers/chills, Last tetanus unknown.     PE:  Gen: NAD  Head: NCAT  HEENT: Oral mucosa moist, normal conjunctiva  CV: RRR no murmurs, normal perfusion, pulses 2+ throughout  Resp: CTA b/l, no wheezing, rales, rhonchi, no respiratory distress  GI: Abd Soft NTND  Neuro: No focal neuro deficits  MSK: FROM all 4 extremities, no deformity  Skin: multiple superficial lacerations to L hand with associated swelling, no erythema/warmth  Psych: Normal affect    MDM: R hand swelling s/p cat bite/scratch, tx with Augmentin, tetanus, dc. No indication for rabies ppx at this time as cat low risk (domestic animal with collar on).  I performed a history and physical exam of the patient and discussed their management with the advanced care provider. I reviewed the advanced care provider's note and agree with the documented findings and plan of care. My medical decision making and objective findings are found above.

## 2021-10-11 NOTE — ED ADULT NURSE NOTE - OBJECTIVE STATEMENT
A&Ox3, resp wnl, c/o right hand swelling s/p cat bite from yesterday was seen in ED was told to return if worse

## 2021-10-11 NOTE — ED ADULT TRIAGE NOTE - CHIEF COMPLAINT QUOTE
Patient ambulated into ED with steady gait, Pt c/o cat bite yesterday was seen has PO antibiotics, swelling and pain much worse today was told to come back if got worse.

## 2021-10-11 NOTE — ED PROVIDER NOTE - NSFOLLOWUPINSTRUCTIONS_ED_ALL_ED_FT
- Prescription sent to pharmacy.  - Acetaminophen 650mg every 6 hours for pain.  - Please bring all documentation from your ED visit to any related future follow up appointment.  - Please call to schedule follow up appointment with your primary care physician within 48 hours for wound check  - Please seek immediate medical attention or return to the ED for any new/worsening, signs/symptoms, or concerns including but not limited to fever, difficulty using fingers, worsening swelling, redness.     Feel better!       Animal Bite    WHAT YOU NEED TO KNOW:    What do I need to know about an animal bite? Animal bite injuries range from shallow cuts to deep, life-threatening wounds. An animal can cut or puncture the skin when it bites. Your skin may be torn from your body. Your skin may swell or bruise even if the bite does not break the skin. Animal bites occur more often on the hands, arms, legs, and face. Bites from dogs and cats are the most common injuries.    What does my healthcare provider need to know about my animal bite?   •What kind of animal bit you? Is the animal a pet? If so, are its vaccines updated?      •When and where did the bite happen? Was the animal bothered by you or another person before it bit? Did the animal show any fear?      •Can the animal be brought in to watch it for sickness or disease?      •Has the wound been treated? If so, what did you use to treat it?       •Do you have any health conditions? Do you currently take any medicines? When was your last tetanus shot?      What tests may I need after an animal bite? Your healthcare provider will look at how big and deep the bite wounds are. He will ask if any area feels numb. Your healthcare provider will check how well you can move the bitten area. He will also check for signs of infection. You may also need the following:   •Blood tests and a sample of fluid or tissue from your wound may show if you have an infection.      •An x-ray may show fractures or foreign objects in your wound.      How is an animal bite treated?   •Irrigation and debridement may be needed to clean out your wound. Dead, damaged, or infected tissue may be cut away to help your wound heal.      •Medicines: ?Antibiotics prevent or treat a bacterial infection.      ?Prescription pain medicine may be given. Ask how to take this medicine safely.      ?A tetanus vaccine may be needed to prevent tetanus. Tetanus is a life-threatening bacterial infection that affects the nerves and muscles. The bacteria can be spread through animal bites.       ?A rabies vaccine may be needed to prevent rabies. Rabies is a life-threatening viral infection. The virus can be spread through animal bites.      •Stitches may be needed if your wound is large and not infected.      •Surgery may be needed to repair deep injuries or severe wounds.      What can I do to manage my symptoms?   •Apply antibiotic ointment as directed. This helps prevent infection in minor skin wounds. It is available without a doctor's order.      •Keep the wound clean and covered. Wash the wound every day with soap and water or germ-killing cleanser. Ask your healthcare provider about the kinds of bandages to use.       •Apply ice on your wound. Ice helps decrease swelling and pain. Ice may also help prevent tissue damage. Use an ice pack, or put crushed ice in a plastic bag. Cover it with a towel and place it on your wound for 15 to 20 minutes every hour or as directed.      •Elevate the wound area. Raise your wound above the level of your heart as often as you can. This will help decrease swelling and pain. Prop your wound on pillows or blankets to keep it elevated comfortably.       What can I do to prevent an animal bite?   •Learn to recognize the signs of a scared pet. Avoid quick, sudden movements.      •Do not step between animals that are fighting.      •Do not leave a pet alone with a young child.      •Do not disturb an animal while it eats, sleeps, or cares for its young.      •Do not approach an animal you do not know, especially one that is tied up or caged.      •Stay away from animals that seem sick or act strangely.      •Do not feed or capture wild animals.       When should I seek immediate care?   •You have a fever.      •Your wound is red, swollen, and draining pus.      •You see red streaks on the skin around the wound.      •You can no longer move the bitten area.      •Your heartbeat and breathing are much faster than usual.      •You feel dizzy and confused.      When should I contact my healthcare provider?   •Your pain does not get better, even after you take pain medicine.      •You have nightmares or flashbacks about the animal bite.       •You have questions or concerns about your condition or care.      CARE AGREEMENT:    You have the right to help plan your care. Learn about your health condition and how it may be treated. Discuss treatment options with your healthcare providers to decide what care you want to receive. You always have the right to refuse treatment. - Prescription sent to pharmacy.  - Acetaminophen 650mg every 6 hours for pain.  - Please bring all documentation from your ED visit to any related future follow up appointment.  - Please call to schedule follow up appointment with your primary care physician within 48 hours for wound check.  - Please seek immediate medical attention or return to the ED for any new/worsening, signs/symptoms, or concerns including but not limited to fever, difficulty using fingers, worsening swelling, redness.     Feel better!       Animal Bite    WHAT YOU NEED TO KNOW:    What do I need to know about an animal bite? Animal bite injuries range from shallow cuts to deep, life-threatening wounds. An animal can cut or puncture the skin when it bites. Your skin may be torn from your body. Your skin may swell or bruise even if the bite does not break the skin. Animal bites occur more often on the hands, arms, legs, and face. Bites from dogs and cats are the most common injuries.    What does my healthcare provider need to know about my animal bite?   •What kind of animal bit you? Is the animal a pet? If so, are its vaccines updated?      •When and where did the bite happen? Was the animal bothered by you or another person before it bit? Did the animal show any fear?      •Can the animal be brought in to watch it for sickness or disease?      •Has the wound been treated? If so, what did you use to treat it?       •Do you have any health conditions? Do you currently take any medicines? When was your last tetanus shot?      What tests may I need after an animal bite? Your healthcare provider will look at how big and deep the bite wounds are. He will ask if any area feels numb. Your healthcare provider will check how well you can move the bitten area. He will also check for signs of infection. You may also need the following:   •Blood tests and a sample of fluid or tissue from your wound may show if you have an infection.      •An x-ray may show fractures or foreign objects in your wound.      How is an animal bite treated?   •Irrigation and debridement may be needed to clean out your wound. Dead, damaged, or infected tissue may be cut away to help your wound heal.      •Medicines: ?Antibiotics prevent or treat a bacterial infection.      ?Prescription pain medicine may be given. Ask how to take this medicine safely.      ?A tetanus vaccine may be needed to prevent tetanus. Tetanus is a life-threatening bacterial infection that affects the nerves and muscles. The bacteria can be spread through animal bites.       ?A rabies vaccine may be needed to prevent rabies. Rabies is a life-threatening viral infection. The virus can be spread through animal bites.      •Stitches may be needed if your wound is large and not infected.      •Surgery may be needed to repair deep injuries or severe wounds.      What can I do to manage my symptoms?   •Apply antibiotic ointment as directed. This helps prevent infection in minor skin wounds. It is available without a doctor's order.      •Keep the wound clean and covered. Wash the wound every day with soap and water or germ-killing cleanser. Ask your healthcare provider about the kinds of bandages to use.       •Apply ice on your wound. Ice helps decrease swelling and pain. Ice may also help prevent tissue damage. Use an ice pack, or put crushed ice in a plastic bag. Cover it with a towel and place it on your wound for 15 to 20 minutes every hour or as directed.      •Elevate the wound area. Raise your wound above the level of your heart as often as you can. This will help decrease swelling and pain. Prop your wound on pillows or blankets to keep it elevated comfortably.       What can I do to prevent an animal bite?   •Learn to recognize the signs of a scared pet. Avoid quick, sudden movements.      •Do not step between animals that are fighting.      •Do not leave a pet alone with a young child.      •Do not disturb an animal while it eats, sleeps, or cares for its young.      •Do not approach an animal you do not know, especially one that is tied up or caged.      •Stay away from animals that seem sick or act strangely.      •Do not feed or capture wild animals.       When should I seek immediate care?   •You have a fever.      •Your wound is red, swollen, and draining pus.      •You see red streaks on the skin around the wound.      •You can no longer move the bitten area.      •Your heartbeat and breathing are much faster than usual.      •You feel dizzy and confused.      When should I contact my healthcare provider?   •Your pain does not get better, even after you take pain medicine.      •You have nightmares or flashbacks about the animal bite.       •You have questions or concerns about your condition or care.      CARE AGREEMENT:    You have the right to help plan your care. Learn about your health condition and how it may be treated. Discuss treatment options with your healthcare providers to decide what care you want to receive. You always have the right to refuse treatment.

## 2021-10-11 NOTE — ED CDU PROVIDER INITIAL DAY NOTE - PHYSICAL EXAMINATION
Const: Awake, alert and oriented. In no acute distress. Well appearing.  HEENT: NC/AT. Moist mucous membranes.  Eyes: No scleral icterus. EOMI.  Neck:. Soft and supple. Full ROM without pain.  Cardiac: S1/S2. No murmurs. Peripheral pulses 2+ and symmetric. No LE edema.  Resp: Speaking in full sentences. No evidence of respiratory distress. No wheezes, rales or rhonchi.  Abd: Soft, non-tender, non-distended. Normal bowel sounds in all 4 quadrants. No guarding or rebound.  Back: Spine midline and non-tender. No CVAT.  MSK: FROM in all extremities neurovasculary intact DP palpable   Skin: Tender red swelling over R thenar eminence tracking up volar aspect of the wrist with streaking up to the forearm   Lymph: No cervical lymphadenopathy.  Neuro: Awake, alert & oriented x 3. Moves all extremities symmetrically.

## 2021-10-11 NOTE — ED STATDOCS - OBJECTIVE STATEMENT
Returned call and scheduled lab9/2/2020/1004/sf   56 y/o female with a PMHx of CAD, s/p stents, Bipolar, GERD, Lupus, Asthma, fibromyalgia, presents to the ED c/o worsening R hand pain s/p house cat bite yesterday. Pt states the cat was stuck in the fence, she tried to help it, but the cat bit her. Pt was seen here last night, was treated with Amoxicillin and received a tetanus shot. Denies fever. Reports body aches. Denies abdominal pain. Smokes marijuana. Occasional EtOH use. Denies illicit drug use. NKDA.

## 2021-10-11 NOTE — ED ADULT TRIAGE NOTE - AS TEMP SITE
Called pharmacy and clarified about note below, pharmacy tech will put a note in the system.  
Patient's pharmacy called today verifying that Dr. Abreu does not do test strips. The patient is not a patient of Dr. Abreu and is not in our pain management program. The pharmacist was 99% sure this call was fake when someone called pretending to be a provider from our office calling in test strips but wanted to double check. If there are any further phone calls from this patient or for this patient regarding test strips from Dr. Abreu, it is fake.   
Please contact pharmacy and let them know that Dr. Hughes has never seen this patient. In his current position with pain management he does not prescribe diabetic test strips or any other type of test strips that I am aware of  
oral

## 2021-10-11 NOTE — ED PROVIDER NOTE - PATIENT PORTAL LINK FT
You can access the FollowMyHealth Patient Portal offered by [facility] by registering at the following website: [email or web address]. By joining [employer]’s FollowMyHealth portal, you will also be able to view your health information using other applications (apps) compatible with our system.

## 2021-10-11 NOTE — ED PROVIDER NOTE - CARE PROVIDER_API CALL
Usman Garcia)  Plastic Surgery  [address] [place] [address]  Phone: [phone]  Fax: [phone]  Follow Up Time:

## 2021-10-11 NOTE — ED STATDOCS - MUSCULOSKELETAL, MLM
(+)Tender, red, swelling over R thenar eminence tracking up volar aspect of the wrist with streaking up the forearm. Maintains good ROM.

## 2021-10-12 VITALS
RESPIRATION RATE: 18 BRPM | HEART RATE: 91 BPM | OXYGEN SATURATION: 98 % | DIASTOLIC BLOOD PRESSURE: 74 MMHG | SYSTOLIC BLOOD PRESSURE: 106 MMHG | TEMPERATURE: 98 F

## 2021-10-12 LAB
APPEARANCE UR: ABNORMAL
BACTERIA # UR AUTO: ABNORMAL
BASOPHILS # BLD AUTO: 0.03 K/UL — SIGNIFICANT CHANGE UP (ref 0–0.2)
BASOPHILS NFR BLD AUTO: 0.4 % — SIGNIFICANT CHANGE UP (ref 0–2)
BILIRUB UR-MCNC: NEGATIVE — SIGNIFICANT CHANGE UP
COLOR SPEC: ABNORMAL
DIFF PNL FLD: ABNORMAL
EOSINOPHIL # BLD AUTO: 0.11 K/UL — SIGNIFICANT CHANGE UP (ref 0–0.5)
EOSINOPHIL NFR BLD AUTO: 1.3 % — SIGNIFICANT CHANGE UP (ref 0–6)
EPI CELLS # UR: SIGNIFICANT CHANGE UP
GLUCOSE UR QL: NEGATIVE MG/DL — SIGNIFICANT CHANGE UP
HCT VFR BLD CALC: 31.7 % — LOW (ref 34.5–45)
HGB BLD-MCNC: 10.6 G/DL — LOW (ref 11.5–15.5)
IMM GRANULOCYTES NFR BLD AUTO: 0.5 % — SIGNIFICANT CHANGE UP (ref 0–1.5)
KETONES UR-MCNC: NEGATIVE — SIGNIFICANT CHANGE UP
LEUKOCYTE ESTERASE UR-ACNC: ABNORMAL
LYMPHOCYTES # BLD AUTO: 3.6 K/UL — HIGH (ref 1–3.3)
LYMPHOCYTES # BLD AUTO: 43.3 % — SIGNIFICANT CHANGE UP (ref 13–44)
MCHC RBC-ENTMCNC: 32.4 PG — SIGNIFICANT CHANGE UP (ref 27–34)
MCHC RBC-ENTMCNC: 33.4 GM/DL — SIGNIFICANT CHANGE UP (ref 32–36)
MCV RBC AUTO: 96.9 FL — SIGNIFICANT CHANGE UP (ref 80–100)
MONOCYTES # BLD AUTO: 0.66 K/UL — SIGNIFICANT CHANGE UP (ref 0–0.9)
MONOCYTES NFR BLD AUTO: 7.9 % — SIGNIFICANT CHANGE UP (ref 2–14)
NEUTROPHILS # BLD AUTO: 3.88 K/UL — SIGNIFICANT CHANGE UP (ref 1.8–7.4)
NEUTROPHILS NFR BLD AUTO: 46.6 % — SIGNIFICANT CHANGE UP (ref 43–77)
NITRITE UR-MCNC: NEGATIVE — SIGNIFICANT CHANGE UP
PH UR: 6.5 — SIGNIFICANT CHANGE UP (ref 5–8)
PLATELET # BLD AUTO: 251 K/UL — SIGNIFICANT CHANGE UP (ref 150–400)
PROT UR-MCNC: 30 MG/DL
RBC # BLD: 3.27 M/UL — LOW (ref 3.8–5.2)
RBC # FLD: 13.9 % — SIGNIFICANT CHANGE UP (ref 10.3–14.5)
RBC CASTS # UR COMP ASSIST: >50 /HPF (ref 0–4)
SP GR SPEC: 1.01 — SIGNIFICANT CHANGE UP (ref 1.01–1.02)
TROPONIN T SERPL-MCNC: <0.01 NG/ML — SIGNIFICANT CHANGE UP (ref 0–0.06)
TROPONIN T SERPL-MCNC: <0.01 NG/ML — SIGNIFICANT CHANGE UP (ref 0–0.06)
UROBILINOGEN FLD QL: NEGATIVE MG/DL — SIGNIFICANT CHANGE UP
WBC # BLD: 8.32 K/UL — SIGNIFICANT CHANGE UP (ref 3.8–10.5)
WBC # FLD AUTO: 8.32 K/UL — SIGNIFICANT CHANGE UP (ref 3.8–10.5)
WBC UR QL: SIGNIFICANT CHANGE UP

## 2021-10-12 PROCEDURE — 85730 THROMBOPLASTIN TIME PARTIAL: CPT

## 2021-10-12 PROCEDURE — 85025 COMPLETE CBC W/AUTO DIFF WBC: CPT

## 2021-10-12 PROCEDURE — 96376 TX/PRO/DX INJ SAME DRUG ADON: CPT

## 2021-10-12 PROCEDURE — G0378: CPT

## 2021-10-12 PROCEDURE — 99217: CPT

## 2021-10-12 PROCEDURE — 85610 PROTHROMBIN TIME: CPT

## 2021-10-12 PROCEDURE — 96375 TX/PRO/DX INJ NEW DRUG ADDON: CPT

## 2021-10-12 PROCEDURE — 36415 COLL VENOUS BLD VENIPUNCTURE: CPT

## 2021-10-12 PROCEDURE — U0003: CPT

## 2021-10-12 PROCEDURE — 84484 ASSAY OF TROPONIN QUANT: CPT

## 2021-10-12 PROCEDURE — U0005: CPT

## 2021-10-12 PROCEDURE — 87040 BLOOD CULTURE FOR BACTERIA: CPT

## 2021-10-12 PROCEDURE — 93010 ELECTROCARDIOGRAM REPORT: CPT

## 2021-10-12 PROCEDURE — 99284 EMERGENCY DEPT VISIT MOD MDM: CPT | Mod: 25

## 2021-10-12 PROCEDURE — 83605 ASSAY OF LACTIC ACID: CPT

## 2021-10-12 PROCEDURE — 80053 COMPREHEN METABOLIC PANEL: CPT

## 2021-10-12 PROCEDURE — 96365 THER/PROPH/DIAG IV INF INIT: CPT

## 2021-10-12 PROCEDURE — 93005 ELECTROCARDIOGRAM TRACING: CPT

## 2021-10-12 PROCEDURE — 81001 URINALYSIS AUTO W/SCOPE: CPT

## 2021-10-12 PROCEDURE — 87086 URINE CULTURE/COLONY COUNT: CPT

## 2021-10-12 RX ORDER — ACETAMINOPHEN 500 MG
650 TABLET ORAL EVERY 6 HOURS
Refills: 0 | Status: DISCONTINUED | OUTPATIENT
Start: 2021-10-12 | End: 2021-10-16

## 2021-10-12 RX ADMIN — AMPICILLIN SODIUM AND SULBACTAM SODIUM 3 GRAM(S): 250; 125 INJECTION, POWDER, FOR SUSPENSION INTRAMUSCULAR; INTRAVENOUS at 11:45

## 2021-10-12 RX ADMIN — Medication 125 MILLIGRAM(S): at 11:11

## 2021-10-12 RX ADMIN — AMPICILLIN SODIUM AND SULBACTAM SODIUM 200 GRAM(S): 250; 125 INJECTION, POWDER, FOR SUSPENSION INTRAMUSCULAR; INTRAVENOUS at 01:12

## 2021-10-12 RX ADMIN — Medication 81 MILLIGRAM(S): at 11:12

## 2021-10-12 RX ADMIN — Medication 12.5 MILLIGRAM(S): at 06:17

## 2021-10-12 RX ADMIN — Medication 650 MILLIGRAM(S): at 11:11

## 2021-10-12 RX ADMIN — Medication 650 MILLIGRAM(S): at 12:00

## 2021-10-12 RX ADMIN — AMPICILLIN SODIUM AND SULBACTAM SODIUM 200 GRAM(S): 250; 125 INJECTION, POWDER, FOR SUSPENSION INTRAMUSCULAR; INTRAVENOUS at 11:15

## 2021-10-12 RX ADMIN — CLOPIDOGREL BISULFATE 75 MILLIGRAM(S): 75 TABLET, FILM COATED ORAL at 11:13

## 2021-10-12 RX ADMIN — AMPICILLIN SODIUM AND SULBACTAM SODIUM 200 GRAM(S): 250; 125 INJECTION, POWDER, FOR SUSPENSION INTRAMUSCULAR; INTRAVENOUS at 06:16

## 2021-10-12 RX ADMIN — APIXABAN 5 MILLIGRAM(S): 2.5 TABLET, FILM COATED ORAL at 06:17

## 2021-10-12 NOTE — ED CDU PROVIDER SUBSEQUENT DAY NOTE - ATTENDING CONTRIBUTION TO CARE
The patient seen and examined    Cat imelda PECK, Jordi Whitehead, performed the initial face to face bedside interview with this patient regarding history of present illness, review of symptoms and relevant past medical, social and family history.  I completed an independent physical examination.  I was the initial provider who evaluated this patient. I have signed out the follow up of any pending tests (i.e. labs, radiological studies) to the ACP.  I have communicated the patient’s plan of care and disposition with the ACP.

## 2021-10-12 NOTE — ED CDU PROVIDER SUBSEQUENT DAY NOTE - PROGRESS NOTE DETAILS
mary gómez   right hand dominant cat bite. 3 doses of unasyn, mild swellign with some streaking to mid forearm. moving all fingers. sensation intact 2+ radial pulse   will continue unasyn   no palpable abscess no discharge pt reporting some mild chest discomfort this morning, worse with walking no associated shortness of breath, hx of recent stents. symptoms resolved once sitting. EKG stable appearing compared to past   cardiologist [place]<3  compliant on all cardiac medications

## 2021-10-12 NOTE — ED ADULT NURSE REASSESSMENT NOTE - NS ED NURSE REASSESS COMMENT FT1
Pt received with no acute distress. Vitals remain stable. Call bell within reach.  Pt resting comfortably, denies pain, rounds complete, will continue to monitor.
Pt remains stable at this time, safety rounds complete, pt denies pain. Will continue to monitor complete.
Pt awake and alert x4, no respiratory distress. Pt c/o chest pain midsternal after ambulating to bathroom. Chest pain has now resolved. LORA Dumont aware. EKG and labs sent as ordered. Cardiac monitor placed as ordered, sinus rhythm 84. Right hand swelling with some redness noted. Elevation encouraged. Will continue to monitor.

## 2021-10-12 NOTE — ED CDU PROVIDER DISPOSITION NOTE - CARE PROVIDER_API CALL
Josie Pastrana)  Plastic Surgery; Surgery of the Hand  [address], [address]  [place] [address]  Phone: [phone]  Fax: [phone]  Follow Up Time: Routine

## 2021-10-12 NOTE — ED CDU PROVIDER DISPOSITION NOTE - PATIENT PORTAL LINK FT
You can access the FollowMyHealth Patient Portal offered by [facility] by registering at the following website: [email or web address]. By joining [employer]’s FollowMyHealth portal, you will also be able to view your health information using other applications (apps) compatible with our system.

## 2021-10-12 NOTE — ED ADULT NURSE REASSESSMENT NOTE - COMFORT CARE
meal provided
meal provided/plan of care explained/wait time explained/warm blanket provided
meal provided/plan of care explained/po fluids offered/repositioned/side rails up/warm blanket provided

## 2021-10-12 NOTE — ED CDU PROVIDER DISPOSITION NOTE - NSFOLLOWUPINSTRUCTIONS_ED_ALL_ED_FT
please elevate the HAND   please continue antibiotic as directed  Tylenol for pain control   new or worsening symptoms return to the Emergency Department for further care

## 2021-10-12 NOTE — ED CDU PROVIDER DISPOSITION NOTE - ATTENDING CONTRIBUTION TO CARE
I, Junior Storey, participated in the care of this patient with the ACP. I discussed the history and physical exam findings as well as lab results and plan of care with the ACP. I agree with ACP's history, physical and assessment.  +significant improvement to erythema, pain, and ROM s/p IV antibx; ok for d/c with outpt antibx and f/u

## 2021-10-12 NOTE — ED CDU PROVIDER DISPOSITION NOTE - CLINICAL COURSE
45 yo female recent cat bite to the right handdischarged on augmentin return with worsening cellulitis and streaking to the right hand and forearm. placed in observation for IV abx. of note had some mild chest discomfort serial trops negative no telemetry events. significant improvement. advised on fu with pmd and hand referral given

## 2021-10-13 LAB
CULTURE RESULTS: NO GROWTH — SIGNIFICANT CHANGE UP
SPECIMEN SOURCE: SIGNIFICANT CHANGE UP

## 2021-10-16 LAB
CULTURE RESULTS: SIGNIFICANT CHANGE UP
CULTURE RESULTS: SIGNIFICANT CHANGE UP
SPECIMEN SOURCE: SIGNIFICANT CHANGE UP
SPECIMEN SOURCE: SIGNIFICANT CHANGE UP

## 2021-10-27 DIAGNOSIS — I51.3 INTRACARDIAC THROMBOSIS, NOT ELSEWHERE CLASSIFIED: ICD-10-CM

## 2021-10-27 RX ORDER — ASPIRIN ENTERIC COATED TABLETS 81 MG 81 MG/1
81 TABLET, DELAYED RELEASE ORAL DAILY
Qty: 30 | Refills: 11 | Status: DISCONTINUED | COMMUNITY
Start: 2021-10-04 | End: 2021-10-27

## 2021-10-28 ENCOUNTER — NON-APPOINTMENT (OUTPATIENT)
Age: 46
End: 2021-10-28

## 2021-11-18 ENCOUNTER — NON-APPOINTMENT (OUTPATIENT)
Age: 46
End: 2021-11-18

## 2021-11-18 ENCOUNTER — APPOINTMENT (OUTPATIENT)
Dept: CARDIOLOGY | Facility: CLINIC | Age: 46
End: 2021-11-18
Payer: MEDICAID

## 2021-11-18 VITALS
BODY MASS INDEX: 31.36 KG/M2 | OXYGEN SATURATION: 98 % | TEMPERATURE: 98.3 F | WEIGHT: 177 LBS | HEART RATE: 82 BPM | HEIGHT: 63 IN | DIASTOLIC BLOOD PRESSURE: 68 MMHG | RESPIRATION RATE: 14 BRPM | SYSTOLIC BLOOD PRESSURE: 144 MMHG

## 2021-11-18 VITALS — SYSTOLIC BLOOD PRESSURE: 108 MMHG | DIASTOLIC BLOOD PRESSURE: 70 MMHG

## 2021-11-18 PROCEDURE — 99215 OFFICE O/P EST HI 40 MIN: CPT

## 2021-11-18 PROCEDURE — 93000 ELECTROCARDIOGRAM COMPLETE: CPT | Mod: 59

## 2021-11-18 PROCEDURE — 93225 XTRNL ECG REC<48 HRS REC: CPT

## 2021-11-30 ENCOUNTER — APPOINTMENT (OUTPATIENT)
Dept: CARDIOLOGY | Facility: CLINIC | Age: 46
End: 2021-11-30

## 2021-12-01 NOTE — ED ADULT TRIAGE NOTE - HEART RATE (BEATS/MIN)
"-- DO NOT REPLY / DO NOT REPLY ALL --  -- Message is from the [facility]--    Patient is requesting a medication refill - medication is on active medication list    Patient is currently OUT of the requested medication. Was Medication Pended? Yes. Rx Name and Dose:    metformin (GLUCOPHAGE) 1000 MG tablet    Duration: 90 days    [id number] - [place], [address] [facility],[address]    Patient confirmed the above pharmacy as correct? Yes    Does this request need an existing or new prescription at a pharmacy to be sent to a new pharmacy location? No    Caller Information       Type Contact Phone    12/01/2021 08:37 AM CST Phone (Incoming) Melinda Ferrari (Emergency Contact) [phone]          Alternative phone number: n/a    Turnaround time given to caller: ""This message will be sent to [facility] Provider's name]. The clinical team will fulfill your request as soon as they review your message. \""  " 83

## 2022-01-03 ENCOUNTER — EMERGENCY (EMERGENCY)
Facility: HOSPITAL | Age: 47
LOS: 1 days | Discharge: HOME | End: 2022-01-03
Attending: EMERGENCY MEDICINE
Payer: MEDICAID

## 2022-01-03 ENCOUNTER — APPOINTMENT (OUTPATIENT)
Dept: CARDIOLOGY | Facility: CLINIC | Age: 47
End: 2022-01-03

## 2022-01-03 VITALS
OXYGEN SATURATION: 99 % | TEMPERATURE: 98 F | RESPIRATION RATE: 18 BRPM | SYSTOLIC BLOOD PRESSURE: 120 MMHG | HEIGHT: 63 IN | WEIGHT: 164.91 LBS | DIASTOLIC BLOOD PRESSURE: 77 MMHG | HEART RATE: 77 BPM

## 2022-01-03 DIAGNOSIS — Z30.8 ENCOUNTER FOR OTHER CONTRACEPTIVE MANAGEMENT: Chronic | ICD-10-CM

## 2022-01-03 DIAGNOSIS — Z90.721 ACQUIRED ABSENCE OF OVARIES, UNILATERAL: Chronic | ICD-10-CM

## 2022-01-03 DIAGNOSIS — Z90.89 ACQUIRED ABSENCE OF OTHER ORGANS: Chronic | ICD-10-CM

## 2022-01-03 LAB
ALBUMIN SERPL ELPH-MCNC: 3.9 G/DL — SIGNIFICANT CHANGE UP (ref 3.3–5.2)
ALP SERPL-CCNC: 116 U/L — SIGNIFICANT CHANGE UP (ref 40–120)
ALT FLD-CCNC: 9 U/L — SIGNIFICANT CHANGE UP
ANION GAP SERPL CALC-SCNC: 10 MMOL/L — SIGNIFICANT CHANGE UP (ref 5–17)
AST SERPL-CCNC: 15 U/L — SIGNIFICANT CHANGE UP
BILIRUB SERPL-MCNC: <0.2 MG/DL — LOW (ref 0.4–2)
BLD GP AB SCN SERPL QL: SIGNIFICANT CHANGE UP
BUN SERPL-MCNC: 8.3 MG/DL — SIGNIFICANT CHANGE UP (ref 8–20)
CALCIUM SERPL-MCNC: 8.9 MG/DL — SIGNIFICANT CHANGE UP (ref 8.6–10.2)
CHLORIDE SERPL-SCNC: 105 MMOL/L — SIGNIFICANT CHANGE UP (ref 98–107)
CO2 SERPL-SCNC: 21 MMOL/L — LOW (ref 22–29)
CREAT SERPL-MCNC: 0.64 MG/DL — SIGNIFICANT CHANGE UP (ref 0.5–1.3)
GLUCOSE SERPL-MCNC: 89 MG/DL — SIGNIFICANT CHANGE UP (ref 70–99)
HCG SERPL-ACNC: <4 MIU/ML — SIGNIFICANT CHANGE UP
HCT VFR BLD CALC: 36.6 % — SIGNIFICANT CHANGE UP (ref 34.5–45)
HGB BLD-MCNC: 12.1 G/DL — SIGNIFICANT CHANGE UP (ref 11.5–15.5)
MCHC RBC-ENTMCNC: 29.7 PG — SIGNIFICANT CHANGE UP (ref 27–34)
MCHC RBC-ENTMCNC: 33.1 GM/DL — SIGNIFICANT CHANGE UP (ref 32–36)
MCV RBC AUTO: 89.7 FL — SIGNIFICANT CHANGE UP (ref 80–100)
PLATELET # BLD AUTO: 290 K/UL — SIGNIFICANT CHANGE UP (ref 150–400)
POTASSIUM SERPL-MCNC: 4.9 MMOL/L — SIGNIFICANT CHANGE UP (ref 3.5–5.3)
POTASSIUM SERPL-SCNC: 4.9 MMOL/L — SIGNIFICANT CHANGE UP (ref 3.5–5.3)
PROT SERPL-MCNC: 6.8 G/DL — SIGNIFICANT CHANGE UP (ref 6.6–8.7)
RBC # BLD: 4.08 M/UL — SIGNIFICANT CHANGE UP (ref 3.8–5.2)
RBC # FLD: 14 % — SIGNIFICANT CHANGE UP (ref 10.3–14.5)
SODIUM SERPL-SCNC: 136 MMOL/L — SIGNIFICANT CHANGE UP (ref 135–145)
WBC # BLD: 7.74 K/UL — SIGNIFICANT CHANGE UP (ref 3.8–10.5)
WBC # FLD AUTO: 7.74 K/UL — SIGNIFICANT CHANGE UP (ref 3.8–10.5)

## 2022-01-03 PROCEDURE — 86900 BLOOD TYPING SEROLOGIC ABO: CPT

## 2022-01-03 PROCEDURE — 84702 CHORIONIC GONADOTROPIN TEST: CPT

## 2022-01-03 PROCEDURE — 76830 TRANSVAGINAL US NON-OB: CPT | Mod: 26

## 2022-01-03 PROCEDURE — 80053 COMPREHEN METABOLIC PANEL: CPT

## 2022-01-03 PROCEDURE — 76856 US EXAM PELVIC COMPLETE: CPT | Mod: 26

## 2022-01-03 PROCEDURE — 86850 RBC ANTIBODY SCREEN: CPT

## 2022-01-03 PROCEDURE — 76830 TRANSVAGINAL US NON-OB: CPT

## 2022-01-03 PROCEDURE — 99284 EMERGENCY DEPT VISIT MOD MDM: CPT | Mod: 25

## 2022-01-03 PROCEDURE — 86901 BLOOD TYPING SEROLOGIC RH(D): CPT

## 2022-01-03 PROCEDURE — 99285 EMERGENCY DEPT VISIT HI MDM: CPT

## 2022-01-03 PROCEDURE — 85027 COMPLETE CBC AUTOMATED: CPT

## 2022-01-03 PROCEDURE — 76856 US EXAM PELVIC COMPLETE: CPT

## 2022-01-03 PROCEDURE — 36415 COLL VENOUS BLD VENIPUNCTURE: CPT

## 2022-01-03 NOTE — ED STATDOCS - OBJECTIVE STATEMENT
45 y/o female with a PMHx of ectopic pregnancy x3, s/p R oophorectomy, CAD, asthma, GERD, Lupus, Bipolar 1 disorder, presents to the c/o heavy vaginal bleeding that began 2 days ago. Associated with fatigue. Pt states bleeding is heavier than her usual menstrual periods. LMP 2 months ago, abnormal: lasted for 2 weeks. Last normal menstrual period was 4-5 months ago. Pt states she is sexually active, but denies being pregnant, states she received Essure sterilization. Pt was on Eliquis, but stopped taking it 3 days ago, after cardiologist told her to stop taking it.

## 2022-01-03 NOTE — ED STATDOCS - PATIENT PORTAL LINK FT
You can access the FollowMyHealth Patient Portal offered by [facility] by registering at the following website: [email or web address]. By joining [email or web address]’s FollowMyHealth portal, you will also be able to view your health information using other applications (apps) compatible with our system.

## 2022-01-03 NOTE — ED STATDOCS - CLINICAL SUMMARY MEDICAL DECISION MAKING FREE TEXT BOX
Pt with vaginal bleeding, heavier than normal. Will evaluate for irregular bleeding, pregnancy. Will obtain CBC, pregnancy test, CMP, pelvic US, reassess.

## 2022-01-03 NOTE — ED ADULT TRIAGE NOTE - CHIEF COMPLAINT QUOTE
pt c/o heavy vaginal bleeding x 2 days, on blood thinner off x 3 days, c/o cramping  A&Ox3, resp wnl

## 2022-01-03 NOTE — ED STATDOCS - NS_ ATTENDINGSCRIBEDETAILS _ED_A_ED_FT
I, Elkin Diehl, performed the initial face to face bedside interview with this patient regarding history of present illness, review of symptoms and relevant past medical, social and family history.  I completed an independent physical examination.  I was the initial provider who evaluated this patient. I have signed out the follow up of any pending tests (i.e. labs, radiological studies) to the ACP.  I have communicated the patient’s plan of care and disposition with the ACP.  The history, relevant review of systems, past medical and surgical history, medical decision making, and physical examination was documented by the scribe in my presence and I attest to the accuracy of the documentation.

## 2022-01-06 ENCOUNTER — APPOINTMENT (OUTPATIENT)
Dept: CARDIOLOGY | Facility: CLINIC | Age: 47
End: 2022-01-06

## 2022-01-11 ENCOUNTER — NON-APPOINTMENT (OUTPATIENT)
Age: 47
End: 2022-01-11

## 2022-01-13 ENCOUNTER — APPOINTMENT (OUTPATIENT)
Dept: CARDIOLOGY | Facility: CLINIC | Age: 47
End: 2022-01-13
Payer: MEDICAID

## 2022-01-13 VITALS
HEIGHT: 63 IN | HEART RATE: 82 BPM | RESPIRATION RATE: 14 BRPM | SYSTOLIC BLOOD PRESSURE: 112 MMHG | BODY MASS INDEX: 31.18 KG/M2 | OXYGEN SATURATION: 97 % | TEMPERATURE: 98.2 F | DIASTOLIC BLOOD PRESSURE: 72 MMHG | WEIGHT: 176 LBS

## 2022-01-13 PROCEDURE — 99214 OFFICE O/P EST MOD 30 MIN: CPT

## 2022-01-21 ENCOUNTER — APPOINTMENT (OUTPATIENT)
Dept: CARDIOLOGY | Facility: CLINIC | Age: 47
End: 2022-01-21

## 2022-02-09 NOTE — ASU PREOP CHECKLIST - VIA
Last Office Visit  -  2-9-2022  Next Office Visit  -      Last Filled  -    Last UDS -    Contract -  Pt states her flonase was supposed to go to [facility] order please resend stretcher

## 2022-03-16 ENCOUNTER — EMERGENCY (EMERGENCY)
Facility: HOSPITAL | Age: 47
LOS: 1 days | Discharge: HOME | End: 2022-03-16
Attending: EMERGENCY MEDICINE
Payer: MEDICAID

## 2022-03-16 VITALS
WEIGHT: 169.98 LBS | DIASTOLIC BLOOD PRESSURE: 75 MMHG | SYSTOLIC BLOOD PRESSURE: 110 MMHG | TEMPERATURE: 98 F | RESPIRATION RATE: 18 BRPM | OXYGEN SATURATION: 98 % | HEIGHT: 63 IN | HEART RATE: 68 BPM

## 2022-03-16 VITALS
OXYGEN SATURATION: 99 % | RESPIRATION RATE: 26 BRPM | HEART RATE: 76 BPM | SYSTOLIC BLOOD PRESSURE: 166 MMHG | DIASTOLIC BLOOD PRESSURE: 104 MMHG

## 2022-03-16 DIAGNOSIS — Z30.8 ENCOUNTER FOR OTHER CONTRACEPTIVE MANAGEMENT: Chronic | ICD-10-CM

## 2022-03-16 DIAGNOSIS — Z90.721 ACQUIRED ABSENCE OF OVARIES, UNILATERAL: Chronic | ICD-10-CM

## 2022-03-16 DIAGNOSIS — Z90.89 ACQUIRED ABSENCE OF OTHER ORGANS: Chronic | ICD-10-CM

## 2022-03-16 LAB
ALBUMIN SERPL ELPH-MCNC: 3.9 G/DL — SIGNIFICANT CHANGE UP (ref 3.3–5.2)
ALP SERPL-CCNC: 150 U/L — HIGH (ref 40–120)
ALT FLD-CCNC: 11 U/L — SIGNIFICANT CHANGE UP
ANION GAP SERPL CALC-SCNC: 12 MMOL/L — SIGNIFICANT CHANGE UP (ref 5–17)
AST SERPL-CCNC: 17 U/L — SIGNIFICANT CHANGE UP
BASOPHILS # BLD AUTO: 0.04 K/UL — SIGNIFICANT CHANGE UP (ref 0–0.2)
BASOPHILS NFR BLD AUTO: 0.5 % — SIGNIFICANT CHANGE UP (ref 0–2)
BILIRUB SERPL-MCNC: <0.2 MG/DL — LOW (ref 0.4–2)
BUN SERPL-MCNC: 8.3 MG/DL — SIGNIFICANT CHANGE UP (ref 8–20)
CALCIUM SERPL-MCNC: 9 MG/DL — SIGNIFICANT CHANGE UP (ref 8.6–10.2)
CHLORIDE SERPL-SCNC: 104 MMOL/L — SIGNIFICANT CHANGE UP (ref 98–107)
CO2 SERPL-SCNC: 23 MMOL/L — SIGNIFICANT CHANGE UP (ref 22–29)
CREAT SERPL-MCNC: 0.65 MG/DL — SIGNIFICANT CHANGE UP (ref 0.5–1.3)
EGFR: 108 ML/MIN/1.73M2 — SIGNIFICANT CHANGE UP
EOSINOPHIL # BLD AUTO: 0.06 K/UL — SIGNIFICANT CHANGE UP (ref 0–0.5)
EOSINOPHIL NFR BLD AUTO: 0.7 % — SIGNIFICANT CHANGE UP (ref 0–6)
GLUCOSE SERPL-MCNC: 90 MG/DL — SIGNIFICANT CHANGE UP (ref 70–99)
HCT VFR BLD CALC: 33.5 % — LOW (ref 34.5–45)
HGB BLD-MCNC: 10.8 G/DL — LOW (ref 11.5–15.5)
IMM GRANULOCYTES NFR BLD AUTO: 0.3 % — SIGNIFICANT CHANGE UP (ref 0–1.5)
LYMPHOCYTES # BLD AUTO: 2.5 K/UL — SIGNIFICANT CHANGE UP (ref 1–3.3)
LYMPHOCYTES # BLD AUTO: 28.2 % — SIGNIFICANT CHANGE UP (ref 13–44)
MCHC RBC-ENTMCNC: 27.6 PG — SIGNIFICANT CHANGE UP (ref 27–34)
MCHC RBC-ENTMCNC: 32.2 GM/DL — SIGNIFICANT CHANGE UP (ref 32–36)
MCV RBC AUTO: 85.5 FL — SIGNIFICANT CHANGE UP (ref 80–100)
MONOCYTES # BLD AUTO: 0.56 K/UL — SIGNIFICANT CHANGE UP (ref 0–0.9)
MONOCYTES NFR BLD AUTO: 6.3 % — SIGNIFICANT CHANGE UP (ref 2–14)
NEUTROPHILS # BLD AUTO: 5.66 K/UL — SIGNIFICANT CHANGE UP (ref 1.8–7.4)
NEUTROPHILS NFR BLD AUTO: 64 % — SIGNIFICANT CHANGE UP (ref 43–77)
PLATELET # BLD AUTO: 381 K/UL — SIGNIFICANT CHANGE UP (ref 150–400)
POTASSIUM SERPL-MCNC: 4.5 MMOL/L — SIGNIFICANT CHANGE UP (ref 3.5–5.3)
POTASSIUM SERPL-SCNC: 4.5 MMOL/L — SIGNIFICANT CHANGE UP (ref 3.5–5.3)
PROT SERPL-MCNC: 7.2 G/DL — SIGNIFICANT CHANGE UP (ref 6.6–8.7)
RBC # BLD: 3.92 M/UL — SIGNIFICANT CHANGE UP (ref 3.8–5.2)
RBC # FLD: 15.5 % — HIGH (ref 10.3–14.5)
SODIUM SERPL-SCNC: 139 MMOL/L — SIGNIFICANT CHANGE UP (ref 135–145)
TROPONIN T SERPL-MCNC: <0.01 NG/ML — SIGNIFICANT CHANGE UP (ref 0–0.06)
WBC # BLD: 8.85 K/UL — SIGNIFICANT CHANGE UP (ref 3.8–10.5)
WBC # FLD AUTO: 8.85 K/UL — SIGNIFICANT CHANGE UP (ref 3.8–10.5)

## 2022-03-16 PROCEDURE — 80053 COMPREHEN METABOLIC PANEL: CPT

## 2022-03-16 PROCEDURE — 93005 ELECTROCARDIOGRAM TRACING: CPT

## 2022-03-16 PROCEDURE — 93010 ELECTROCARDIOGRAM REPORT: CPT

## 2022-03-16 PROCEDURE — 84484 ASSAY OF TROPONIN QUANT: CPT

## 2022-03-16 PROCEDURE — 99285 EMERGENCY DEPT VISIT HI MDM: CPT | Mod: 25

## 2022-03-16 PROCEDURE — 71045 X-RAY EXAM CHEST 1 VIEW: CPT

## 2022-03-16 PROCEDURE — 99285 EMERGENCY DEPT VISIT HI MDM: CPT

## 2022-03-16 PROCEDURE — 85025 COMPLETE CBC W/AUTO DIFF WBC: CPT

## 2022-03-16 PROCEDURE — 71045 X-RAY EXAM CHEST 1 VIEW: CPT | Mod: 26

## 2022-03-16 PROCEDURE — 36415 COLL VENOUS BLD VENIPUNCTURE: CPT

## 2022-03-16 NOTE — ED ADULT TRIAGE NOTE - CHIEF COMPLAINT QUOTE
Patient BIBA to ED with c/o chest pains.  Patient states she was walking and then started to have the symptoms.

## 2022-03-16 NOTE — ED PROVIDER NOTE - OBJECTIVE STATEMENT
48 y/o female comes in c/o sharp chest pain radiating to right neck x 7 mins no associated signs or symptoms   no n/v/d/ no sob   states h/o MI in sept with stent due to see cardiologist on Monday   currently asymptomatic

## 2022-03-16 NOTE — ED PROVIDER NOTE - PATIENT PORTAL LINK FT
You can access the FollowMyHealth Patient Portal offered by [facility] by registering at the following website: [email or web address]. By joining [email or web address]’s FollowMyHealth portal, you will also be able to view your health information using other applications (apps) compatible with our system.

## 2022-03-16 NOTE — ED PROVIDER NOTE - PROGRESS NOTE DETAILS
pt with normal labs, chest pain resolved   pt does not want to stay in ED for repeat troponin   want to leave its her bday   understands that evaluation is not complete   and death or permanent disability may result

## 2022-03-16 NOTE — ED ADULT NURSE NOTE - OBJECTIVE STATEMENT
Richard Castillo
Pt AOx4, resp even and unlabored. Pt reports while walking this morning she felt a pain to her chest that was uncomfortable and decided to come to ER.

## 2022-03-17 ENCOUNTER — APPOINTMENT (OUTPATIENT)
Dept: CARDIOLOGY | Facility: CLINIC | Age: 47
End: 2022-03-17
Payer: MEDICAID

## 2022-03-17 ENCOUNTER — APPOINTMENT (OUTPATIENT)
Dept: OBGYN | Facility: CLINIC | Age: 47
End: 2022-03-17
Payer: MEDICAID

## 2022-03-17 ENCOUNTER — NON-APPOINTMENT (OUTPATIENT)
Age: 47
End: 2022-03-17

## 2022-03-17 VITALS
HEIGHT: 63 IN | WEIGHT: 177 LBS | HEART RATE: 86 BPM | OXYGEN SATURATION: 100 % | DIASTOLIC BLOOD PRESSURE: 72 MMHG | TEMPERATURE: 97.6 F | SYSTOLIC BLOOD PRESSURE: 118 MMHG | BODY MASS INDEX: 31.36 KG/M2

## 2022-03-17 VITALS
WEIGHT: 177 LBS | DIASTOLIC BLOOD PRESSURE: 70 MMHG | BODY MASS INDEX: 31.36 KG/M2 | HEIGHT: 63 IN | SYSTOLIC BLOOD PRESSURE: 120 MMHG

## 2022-03-17 DIAGNOSIS — Z87.42 PERSONAL HISTORY OF OTHER DISEASES OF THE FEMALE GENITAL TRACT: ICD-10-CM

## 2022-03-17 DIAGNOSIS — R07.89 OTHER CHEST PAIN: ICD-10-CM

## 2022-03-17 DIAGNOSIS — I51.3 INTRACARDIAC THROMBOSIS, NOT ELSEWHERE CLASSIFIED: ICD-10-CM

## 2022-03-17 DIAGNOSIS — A59.9 TRICHOMONIASIS, UNSPECIFIED: ICD-10-CM

## 2022-03-17 DIAGNOSIS — N89.8 OTHER SPECIFIED NONINFLAMMATORY DISORDERS OF VAGINA: ICD-10-CM

## 2022-03-17 PROCEDURE — 99213 OFFICE O/P EST LOW 20 MIN: CPT

## 2022-03-17 PROCEDURE — 93000 ELECTROCARDIOGRAM COMPLETE: CPT

## 2022-03-17 PROCEDURE — 99214 OFFICE O/P EST MOD 30 MIN: CPT

## 2022-03-17 RX ORDER — MIRTAZAPINE 30 MG/1
30 TABLET, FILM COATED ORAL
Refills: 0 | Status: DISCONTINUED | COMMUNITY
Start: 2021-10-04 | End: 2022-03-17

## 2022-03-17 RX ORDER — RANOLAZINE 500 MG/1
500 TABLET, EXTENDED RELEASE ORAL
Qty: 180 | Refills: 1 | Status: DISCONTINUED | COMMUNITY
Start: 2021-11-18 | End: 2022-03-17

## 2022-03-17 RX ORDER — CLOPIDOGREL BISULFATE 75 MG/1
75 TABLET, FILM COATED ORAL
Qty: 90 | Refills: 0 | Status: DISCONTINUED | COMMUNITY
Start: 2021-10-04 | End: 2022-03-17

## 2022-03-17 NOTE — PHYSICAL EXAM
[Chaperone Present] : A chaperone was present in the examining room during all aspects of the physical examination [Normal] : uterus [No Bleeding] : there was no active vaginal bleeding [Discharge] : had a ~M discharge [Scant] : scant [Clear] : clear [Thin] : thin [Uterine Adnexae] : were not tender and not enlarged [FreeTextEntry1] : Rachel

## 2022-03-17 NOTE — CHIEF COMPLAINT
[Urgent Visit] : Urgent Visit [FreeTextEntry1] : " i got a wax and i now have a discharge and irritation "

## 2022-03-18 PROBLEM — A59.9 TRICHIMONIASIS: Status: ACTIVE | Noted: 2022-03-18

## 2022-03-18 LAB
CANDIDA VAG CYTO: NOT DETECTED
G VAGINALIS+PREV SP MTYP VAG QL MICRO: NOT DETECTED
T VAGINALIS VAG QL WET PREP: DETECTED

## 2022-03-22 ENCOUNTER — APPOINTMENT (OUTPATIENT)
Dept: CARDIOLOGY | Facility: CLINIC | Age: 47
End: 2022-03-22

## 2022-03-23 DIAGNOSIS — B37.3 CANDIDIASIS OF VULVA AND VAGINA: ICD-10-CM

## 2022-04-01 ENCOUNTER — MED ADMIN CHARGE (OUTPATIENT)
Age: 47
End: 2022-04-01

## 2022-04-01 ENCOUNTER — APPOINTMENT (OUTPATIENT)
Dept: OBGYN | Facility: CLINIC | Age: 47
End: 2022-04-01

## 2022-04-01 ENCOUNTER — APPOINTMENT (OUTPATIENT)
Dept: CARDIOLOGY | Facility: CLINIC | Age: 47
End: 2022-04-01
Payer: MEDICAID

## 2022-04-01 PROCEDURE — 93306 TTE W/DOPPLER COMPLETE: CPT

## 2022-04-01 RX ORDER — PERFLUTREN 6.52 MG/ML
6.52 INJECTION, SUSPENSION INTRAVENOUS
Qty: 2 | Refills: 0 | Status: COMPLETED | OUTPATIENT
Start: 2022-04-01

## 2022-04-01 RX ADMIN — PERFLUTREN MG/ML: 6.52 INJECTION, SUSPENSION INTRAVENOUS at 00:00

## 2022-04-03 NOTE — ED STATDOCS - CPE ED SKIN NORM
[id number] [employer]. All rights reserved. This information is not intended as a substitute for professional medical care. Always follow your healthcare professional's instructions.            normal...

## 2022-04-04 ENCOUNTER — NON-APPOINTMENT (OUTPATIENT)
Age: 47
End: 2022-04-04

## 2022-04-06 RX ORDER — APIXABAN 5 MG/1
5 TABLET, FILM COATED ORAL
Qty: 60 | Refills: 1 | Status: DISCONTINUED | COMMUNITY
Start: 2021-10-04 | End: 2022-04-06

## 2022-04-11 ENCOUNTER — APPOINTMENT (OUTPATIENT)
Dept: OBGYN | Facility: CLINIC | Age: 47
End: 2022-04-11
Payer: MEDICAID

## 2022-04-11 VITALS
DIASTOLIC BLOOD PRESSURE: 66 MMHG | SYSTOLIC BLOOD PRESSURE: 100 MMHG | HEIGHT: 63 IN | WEIGHT: 178.13 LBS | BODY MASS INDEX: 31.56 KG/M2

## 2022-04-11 DIAGNOSIS — Z01.411 ENCOUNTER FOR GYNECOLOGICAL EXAMINATION (GENERAL) (ROUTINE) WITH ABNORMAL FINDINGS: ICD-10-CM

## 2022-04-11 DIAGNOSIS — Z86.79 PERSONAL HISTORY OF OTHER DISEASES OF THE CIRCULATORY SYSTEM: ICD-10-CM

## 2022-04-11 PROCEDURE — 99396 PREV VISIT EST AGE 40-64: CPT

## 2022-04-11 NOTE — HISTORY OF PRESENT ILLNESS
[Y] : Patient is sexually active [Menarche Age: ____] : age at menarche was [unfilled] [FreeTextEntry1] : 47 year old female presents for annual examination. \par \par Pt. was recently treated for trichomoniasis, doing well no complaints of discharge or pelvic pain. Pt. states her periods are extremely heavy where she is soaking a pad an hour and are very painful. Veronika states she was evaluated at [facility] in January 2022 due to heavy vaginal bleeding but is unsure what they saw on sonogram. No other complaints today.  [PGxTotal] : 9 [[facility]Term] : 3 [PGHxPremature] : 0 [PGHxAbortions] : 0 [[facility] Hospitaliving] : 3 [PGHxABInduced] : 0 [PGHxABSpont] : 6 [PGxEctopic] : 1 [PGHxMultBirths] : 0

## 2022-04-11 NOTE — PLAN
[FreeTextEntry1] : Encounter for GYN exam \par \par - PAP obtained \par - GC obtained \par - Mammogram prescription provided. \par \par Heavy Menses \par \par - Reviewed sonogram from Jan 2022. ? Right ovarian complex cyst and left ovarian hemorrhagic cyst. 14 mm endometrium  Will repeat sonogram. \par \par F/U in 2-3 weeks after sonogram to discuss results and determine plan of care. \par All questions and concerns addressed during encounter. Pt. agreed to plan of care.

## 2022-04-11 NOTE — COUNSELING
[Nutrition/ Exercise/ Weight Management] : nutrition, exercise, weight management [Body Image] : body image [Vitamins/Supplements] : vitamins/supplements [Smoking Cessation] : smoking cessation [Breast Self Exam] : breast self exam [Bladder Hygiene] : bladder hygiene

## 2022-04-11 NOTE — PHYSICAL EXAM
[Chaperone Present] : A chaperone was present in the examining room during all aspects of the physical examination [Appropriately responsive] : appropriately responsive [Alert] : alert [No Acute Distress] : no acute distress [Soft] : soft [Non-tender] : non-tender [Non-distended] : non-distended [No HSM] : No HSM [No Lesions] : no lesions [No Mass] : no mass [Oriented x3] : oriented x3 [Examination Of The Breasts] : a normal appearance [No Masses] : no breast masses were palpable [Labia Majora] : normal [Labia Minora] : normal [Normal] : normal [Uterine Adnexae] : normal [FreeTextEntry1] : Pavithra

## 2022-04-12 LAB
C TRACH RRNA SPEC QL NAA+PROBE: NOT DETECTED
HPV HIGH+LOW RISK DNA PNL CVX: DETECTED
N GONORRHOEA RRNA SPEC QL NAA+PROBE: NOT DETECTED
SOURCE TP AMPLIFICATION: NORMAL

## 2022-04-15 LAB — CYTOLOGY CVX/VAG DOC THIN PREP: NORMAL

## 2022-05-09 ENCOUNTER — NON-APPOINTMENT (OUTPATIENT)
Age: 47
End: 2022-05-09

## 2022-05-09 ENCOUNTER — INPATIENT (INPATIENT)
Facility: HOSPITAL | Age: 47
LOS: 0 days | Discharge: HOME | DRG: 93 | End: 2022-05-10
Attending: STUDENT IN AN ORGANIZED HEALTH CARE EDUCATION/TRAINING PROGRAM | Admitting: STUDENT IN AN ORGANIZED HEALTH CARE EDUCATION/TRAINING PROGRAM
Payer: MEDICAID

## 2022-05-09 VITALS
RESPIRATION RATE: 16 BRPM | HEART RATE: 84 BPM | TEMPERATURE: 99 F | OXYGEN SATURATION: 100 % | DIASTOLIC BLOOD PRESSURE: 77 MMHG | HEIGHT: 63 IN | WEIGHT: 177.91 LBS | SYSTOLIC BLOOD PRESSURE: 125 MMHG

## 2022-05-09 DIAGNOSIS — Z30.8 ENCOUNTER FOR OTHER CONTRACEPTIVE MANAGEMENT: Chronic | ICD-10-CM

## 2022-05-09 DIAGNOSIS — I25.119 ATHEROSCLEROTIC HEART DISEASE OF NATIVE CORONARY ARTERY WITH UNSPECIFIED ANGINA PECTORIS: ICD-10-CM

## 2022-05-09 DIAGNOSIS — Z90.721 ACQUIRED ABSENCE OF OVARIES, UNILATERAL: Chronic | ICD-10-CM

## 2022-05-09 DIAGNOSIS — Z90.89 ACQUIRED ABSENCE OF OTHER ORGANS: Chronic | ICD-10-CM

## 2022-05-09 LAB
ALBUMIN SERPL ELPH-MCNC: 4 G/DL — SIGNIFICANT CHANGE UP (ref 3.3–5.2)
ALP SERPL-CCNC: 136 U/L — HIGH (ref 40–120)
ALT FLD-CCNC: 11 U/L — SIGNIFICANT CHANGE UP
ANION GAP SERPL CALC-SCNC: 13 MMOL/L — SIGNIFICANT CHANGE UP (ref 5–17)
AST SERPL-CCNC: 16 U/L — SIGNIFICANT CHANGE UP
BASE EXCESS BLDV CALC-SCNC: -1.1 MMOL/L — SIGNIFICANT CHANGE UP (ref -2–3)
BASOPHILS # BLD AUTO: 0.04 K/UL — SIGNIFICANT CHANGE UP (ref 0–0.2)
BASOPHILS NFR BLD AUTO: 0.4 % — SIGNIFICANT CHANGE UP (ref 0–2)
BILIRUB SERPL-MCNC: <0.2 MG/DL — LOW (ref 0.4–2)
BUN SERPL-MCNC: 12.8 MG/DL — SIGNIFICANT CHANGE UP (ref 8–20)
CA-I SERPL-SCNC: 1.17 MMOL/L — SIGNIFICANT CHANGE UP (ref 1.15–1.33)
CALCIUM SERPL-MCNC: 8.7 MG/DL — SIGNIFICANT CHANGE UP (ref 8.6–10.2)
CHLORIDE BLDV-SCNC: 104 MMOL/L — SIGNIFICANT CHANGE UP (ref 98–107)
CHLORIDE SERPL-SCNC: 102 MMOL/L — SIGNIFICANT CHANGE UP (ref 98–107)
CO2 SERPL-SCNC: 22 MMOL/L — SIGNIFICANT CHANGE UP (ref 22–29)
CREAT SERPL-MCNC: 0.83 MG/DL — SIGNIFICANT CHANGE UP (ref 0.5–1.3)
EGFR: 86 ML/MIN/1.73M2 — SIGNIFICANT CHANGE UP
EOSINOPHIL # BLD AUTO: 0.06 K/UL — SIGNIFICANT CHANGE UP (ref 0–0.5)
EOSINOPHIL NFR BLD AUTO: 0.6 % — SIGNIFICANT CHANGE UP (ref 0–6)
GAS PNL BLDV: 137 MMOL/L — SIGNIFICANT CHANGE UP (ref 136–145)
GAS PNL BLDV: SIGNIFICANT CHANGE UP
GLUCOSE BLDV-MCNC: 73 MG/DL — SIGNIFICANT CHANGE UP (ref 70–99)
GLUCOSE SERPL-MCNC: 74 MG/DL — SIGNIFICANT CHANGE UP (ref 70–99)
HCG SERPL-ACNC: <4 MIU/ML — SIGNIFICANT CHANGE UP
HCO3 BLDV-SCNC: 26 MMOL/L — SIGNIFICANT CHANGE UP (ref 22–29)
HCT VFR BLD CALC: 32.1 % — LOW (ref 34.5–45)
HCT VFR BLDA CALC: 33 % — SIGNIFICANT CHANGE UP
HGB BLD CALC-MCNC: 11.1 G/DL — LOW (ref 11.7–16.1)
HGB BLD-MCNC: 10.6 G/DL — LOW (ref 11.5–15.5)
IMM GRANULOCYTES NFR BLD AUTO: 0.3 % — SIGNIFICANT CHANGE UP (ref 0–1.5)
LACTATE BLDV-MCNC: 2.1 MMOL/L — HIGH (ref 0.5–2)
LYMPHOCYTES # BLD AUTO: 3.54 K/UL — HIGH (ref 1–3.3)
LYMPHOCYTES # BLD AUTO: 36.6 % — SIGNIFICANT CHANGE UP (ref 13–44)
MCHC RBC-ENTMCNC: 27.7 PG — SIGNIFICANT CHANGE UP (ref 27–34)
MCHC RBC-ENTMCNC: 33 GM/DL — SIGNIFICANT CHANGE UP (ref 32–36)
MCV RBC AUTO: 84 FL — SIGNIFICANT CHANGE UP (ref 80–100)
MONOCYTES # BLD AUTO: 0.64 K/UL — SIGNIFICANT CHANGE UP (ref 0–0.9)
MONOCYTES NFR BLD AUTO: 6.6 % — SIGNIFICANT CHANGE UP (ref 2–14)
NEUTROPHILS # BLD AUTO: 5.35 K/UL — SIGNIFICANT CHANGE UP (ref 1.8–7.4)
NEUTROPHILS NFR BLD AUTO: 55.5 % — SIGNIFICANT CHANGE UP (ref 43–77)
PCO2 BLDV: 53 MMHG — HIGH (ref 39–42)
PH BLDV: 7.29 — LOW (ref 7.32–7.43)
PLATELET # BLD AUTO: 345 K/UL — SIGNIFICANT CHANGE UP (ref 150–400)
PO2 BLDV: 52 MMHG — HIGH (ref 25–45)
POTASSIUM BLDV-SCNC: 3.4 MMOL/L — LOW (ref 3.5–5.1)
POTASSIUM SERPL-MCNC: 3.4 MMOL/L — LOW (ref 3.5–5.3)
POTASSIUM SERPL-SCNC: 3.4 MMOL/L — LOW (ref 3.5–5.3)
PROT SERPL-MCNC: 6.9 G/DL — SIGNIFICANT CHANGE UP (ref 6.6–8.7)
RAPID RVP RESULT: SIGNIFICANT CHANGE UP
RBC # BLD: 3.82 M/UL — SIGNIFICANT CHANGE UP (ref 3.8–5.2)
RBC # FLD: 17.9 % — HIGH (ref 10.3–14.5)
SAO2 % BLDV: 84.5 % — SIGNIFICANT CHANGE UP
SARS-COV-2 RNA SPEC QL NAA+PROBE: SIGNIFICANT CHANGE UP
SODIUM SERPL-SCNC: 137 MMOL/L — SIGNIFICANT CHANGE UP (ref 135–145)
TROPONIN T SERPL-MCNC: <0.01 NG/ML — SIGNIFICANT CHANGE UP (ref 0–0.06)
WBC # BLD: 9.66 K/UL — SIGNIFICANT CHANGE UP (ref 3.8–10.5)
WBC # FLD AUTO: 9.66 K/UL — SIGNIFICANT CHANGE UP (ref 3.8–10.5)

## 2022-05-09 PROCEDURE — 99285 EMERGENCY DEPT VISIT HI MDM: CPT

## 2022-05-09 PROCEDURE — 70450 CT HEAD/BRAIN W/O DYE: CPT | Mod: 26,MA

## 2022-05-09 PROCEDURE — 93010 ELECTROCARDIOGRAM REPORT: CPT

## 2022-05-09 PROCEDURE — 71045 X-RAY EXAM CHEST 1 VIEW: CPT | Mod: 26

## 2022-05-09 RX ORDER — ASPIRIN/CALCIUM CARB/MAGNESIUM 324 MG
325 TABLET ORAL ONCE
Refills: 0 | Status: COMPLETED | OUTPATIENT
Start: 2022-05-09 | End: 2022-05-09

## 2022-05-09 RX ADMIN — Medication 325 MILLIGRAM(S): at 23:43

## 2022-05-09 NOTE — CONSULT NOTE ADULT - PROBLEM SELECTOR RECOMMENDATION 9
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

## 2022-05-09 NOTE — ED PROVIDER NOTE - OBJECTIVE STATEMENT
The patient is a 47 year old female with history of MI with stent in the recent past presents with 4 days history of right sided arm numbness and also felt fluttering sensation of the chest. No SOB, No abd pain, No motor No sensory loss

## 2022-05-09 NOTE — ED ADULT TRIAGE NOTE - CHIEF COMPLAINT QUOTE
Ambulatory patient of MD White reporting 3-4 days of intermittent "chest fluttering and pain down her R arm." EKG completed prior to triage. PMH NSTEMI with 1 stent on 81mg ASA. Also reports that she is having increased GERD which is another symptom she had with her previous MI. Ambulatory patient of MD White reporting 3-4 days of intermittent "chest fluttering and pain down her R arm." EKG completed prior to triage. PMH NSTEMI with 1 stent on 81mg ASA. Also reports that she is having increased GERD which is another symptom she had with her previous MI. Actual weight in kg's taken in triage.

## 2022-05-09 NOTE — CONSULT NOTE ADULT - PROBLEM SELECTOR RECOMMENDATION 2
Continue home ASA, BB, Statin and ACEi therapies  - low cholesterol diet  - daily exercise and optimal weight management      case d/w Dr. Suarez Continue home ASA, BB, Statin and ACEi therapies  - low cholesterol diet  - daily exercise and optimal weight management

## 2022-05-09 NOTE — CONSULT NOTE ADULT - NS ATTEND AMEND GEN_ALL_CORE FT
PT seen and examined in the ED  Plan of care ULICES PAULINO in ED.  Pt with history of CAD and anterior wall MI, bipolar disorder whose medication was changed recently. She has not slept in many nights and is tired.  She denies any cp or sob but feels tingling down right arm. CT head was negative for acute CVA  Symptoms are improving. She gets occasional skipped heart beats, no events on tele and EKG is unchanged  Imaging reports also reviewed.  Pt is cp free and has done well from cardiac stand point.   She can fu in office, we can perform MCOT for a longer period if palpitations and skipped heart beats continue  Advised her to see psych for further medical management.    Thank you

## 2022-05-09 NOTE — CONSULT NOTE ADULT - SUBJECTIVE AND OBJECTIVE BOX
[facility]                                              CARDIOLOGY AT [facility]                                                   [address], [place] [address]                                             Telephone: [phone]. Fax:[phone]                                                       CARDIOLOGY CONSULTATION NOTE                                                                                             History obtained by: Patient and medical record  Community Cardiologist: Dr. Vasquez   obtained: Yes [  ] No [  ]  Reason for Consultation: CP  Available out pt records reviewed: Yes [X] No [  ]    Chief complaint:  chest pain and palpitations    HPI: Patient is a 47yo F w/ PMHx of Asthma, Bipolar, S Lupus, + Covid-19, MI s/p PCI to LAD, LV apical thrombus, admitted to [facility] after she developed     Echo was with normal EF. She was noted to have Utox pos for Cocain/THC/Amphetamines. She ended up being DCd with o/p f/u and was sent in today by Cardiology after she f/u an      CARDIAC TESTING   ECHO:    STRESS:    CATH:     ELECTROPHYSIOLOGY:     PAST MEDICAL HISTORY  No pertinent past medical history    Lupus    Bipolar 1 disorder, depressed    Asthma    Ectopic pregnancy        History of heart murmur in childhood    Palpitations    Thyroid nodule    GERD (gastroesophageal reflux disease)    CAD (coronary atherosclerotic disease)        PAST SURGICAL HISTORY  H/O oophorectomy    S/P tonsillectomy    Encounter for post Essure sterilization check        SOCIAL HISTORY:  Denies smoking/alcohol/drugs  CIGARETTES:     ALCOHOL:  DRUGS:    FAMILY HISTORY:  Family history of cancer in father    Family history of hypothyroidism    FH: HTN (hypertension) (Mother)    FH: hyperlipidemia (Mother)      Family History of Cardiovascular Disease:  Yes [  ] No [  ]  Coronary Artery Disease in first degree relative: Yes [  ] No [  ]  Sudden Cardiac Death in First degree relative: Yes [  ] No [  ]    HOME MEDICATIONS:  Abilify 20 mg oral tablet: 1 tab(s) orally once a day (at bedtime) (04 Oct 2021 19:05)  acetaminophen 325 mg oral tablet: 2 tab(s) orally every 6 hours, As needed, Temp greater or equal to 38C (100.4F), Mild Pain (1 - 3) (04 Oct 2021 19:05)  aluminum hydroxide-magnesium hydroxide 200 mg-200 mg/5 mL oral suspension: 30 milliliter(s) orally every 4 hours, As needed, Dyspepsia (06 Oct 2021 13:20)  aspirin 81 mg oral tablet, chewable: 1 tab(s) orally once a day (04 Oct 2021 21:14)  Atarax 50 mg oral tablet: 1 tab(s) orally 4 times a day (04 Oct 2021 19:05)  atorvastatin 40 mg oral tablet: 1 tab(s) orally once a day (04 Oct 2021 21:14)  benzonatate 100 mg oral capsule: 1 cap(s) orally 3 times a day (04 Oct 2021 21:14)  Depakote  mg oral tablet, extended release: 2 tab(s) orally once a day (at bedtime) (04 Oct 2021 19:05)  Eliquis 5 mg oral tablet: 1 tab(s) orally 2 times a day (04 Oct 2021 21:14)  Lopressor: 12.5 milligram(s) orally (04 Oct 2021 21:14)  melatonin 3 mg oral tablet: 1 tab(s) orally once a day (at bedtime), As needed, Insomnia (06 Oct 2021 13:20)  mirtazapine 30 mg oral tablet, disintegratin tab(s) orally once a day (at bedtime) (04 Oct 2021 19:05)  Plavix 75 mg oral tablet: 1 tab(s) orally once a day (04 Oct 2021 21:14)  Protonix 40 mg oral delayed release tablet: 1 tab(s) orally once a day (04 Oct 2021 21:14)  zinc sulfate 220 mg oral capsule: 1 cap(s) orally once a day (04 Oct 2021 19:05)      CURRENT CARDIAC MEDICATIONS:      CURRENT OTHER MEDICATIONS:  aspirin 325 milliGRAM(s) Oral Once, Stop order after: 1 Doses      ALLERGIES:   No Known Allergies      REVIEW OF SYMPTOMS:   CONSTITUTIONAL: No fever, no chills, no weight loss, no weight gain, no fatigue   ENMT:  No vertigo; No sinus or throat pain  NECK: No pain or stiffness  CARDIOVASCULAR: No chest pain, no dyspnea, no syncope/presyncope, no palpitations, no dizziness, no Orthopnea, no Paroxsymal nocturnal dyspnea  RESPIRATORY: no Shortness of breath, no cough, no wheezing  : No dysuria, no hematuria   GI: No dark color stool, no nausea, no diarrhea, no constipation, no abdominal pain   NEURO: No headache, no slurred speech   MUSCULOSKELETAL: No joint pain or swelling; No muscle, back, or extremity pain  PSYCH: No agitation, no anxiety.    ALL OTHER REVIEW OF SYSTEMS ARE NEGATIVE.    VITAL SIGNS:  T(C): 37 (22 @ 20:22), Max: 37 (22 @ 20:22)  T(F): 98.6 (22 @ 20:22), Max: 98.6 (22 @ 20:22)  HR: 84 (22 @ 20:22) (84 - 84)  BP: 125/77 (22 @ 20:22) (125/77 - 125/77)  RR: 16 (22 @ 20:22) (16 - 16)  SpO2: 100% (22 @ 20:22) (100% - 100%)    INTAKE AND OUTPUT:       PHYSICAL EXAM:  Constitutional: Comfortable . No acute distress.   HEENT: Atraumatic and normocephalic , neck is supple . no JVD. No carotid bruit.  CNS: A&Ox3. No focal deficits.   Respiratory: CTAB, unlabored   Cardiovascular: RRR normal s1 s2. No murmur. No rubs or gallop.  Gastrointestinal: Soft, non-tender. +Bowel sounds.   Extremities: 2+ Peripheral Pulses, No clubbing, cyanosis, or edema  Psychiatric: Calm . no agitation.   Skin: Warm and dry, no ulcers on extremities     LABS:  ( 09 May 2022 21:47 )  Troponin T  <0.01,  CPK  X    , CKMB  X    , BNP X                                  10.6   9.66  )-----------( 345      ( 09 May 2022 21:47 )             32.1         137  |  102  |  12.8  ----------------------------<  74  3.4<L>   |  22.0  |  0.83    Ca    8.7      09 May 2022 21:47    TPro  6.9  /  Alb  4.0  /  TBili  <0.2<L>  /  DBili  x   /  AST  16  /  ALT  11  /  AlkPhos  136<H>                  INTERPRETATION OF TELEMETRY:     ECG:   Prior ECG: Yes [  ] No [  ]    RADIOLOGY & ADDITIONAL STUDIES:    X-ray:    CT scan:   MRI:   US:                                                [facility]                                              CARDIOLOGY AT [facility]                                                   [address], [place] [address]                                             Telephone: [phone]. Fax:[phone]                                                       CARDIOLOGY CONSULTATION NOTE                                                                                             History obtained by: Patient and medical record  Community Cardiologist: Dr. Vasquez   obtained: Yes [  ] No [  ]  Reason for Consultation: numbness of RUE  Available out pt records reviewed: Yes [X] No [  ]    Chief complaint:  RUE numbness and palpitations    HPI: Patient is a 47yo F w/ PMHx of Asthma, Bipolar, S Lupus, + Covid-19, MI s/p PCI to LAD, LV apical thrombus no longer on Eliquis, came in w/ RUE numbness and palpitations.  States for past 4 days she feels RUE numbness that comes and goes and also has some random palpitations occasionally.  Symptoms all new.  No accident or fall reported.  Takes her meds every day and denies CP, diaphoresis or leg edema.  Came in for evaluation of numbness,   Echo was with normal EF.     CARDIAC TESTING   ECHO:    STRESS:    CATH:     ELECTROPHYSIOLOGY:     PAST MEDICAL HISTORY  No pertinent past medical history    Lupus    Bipolar 1 disorder, depressed    Asthma    Ectopic pregnancy        History of heart murmur in childhood    Palpitations    Thyroid nodule    GERD (gastroesophageal reflux disease)    CAD (coronary atherosclerotic disease)        PAST SURGICAL HISTORY  H/O oophorectomy    S/P tonsillectomy    Encounter for post Essure sterilization check        SOCIAL HISTORY:  Denies smoking/alcohol/drugs  CIGARETTES:     ALCOHOL:  DRUGS:    FAMILY HISTORY:  Family history of cancer in father    Family history of hypothyroidism    FH: HTN (hypertension) (Mother)    FH: hyperlipidemia (Mother)      Family History of Cardiovascular Disease:  Yes [  ] No [  ]  Coronary Artery Disease in first degree relative: Yes [  ] No [  ]  Sudden Cardiac Death in First degree relative: Yes [  ] No [  ]    HOME MEDICATIONS:  Abilify 20 mg oral tablet: 1 tab(s) orally once a day (at bedtime) (04 Oct 2021 19:05)  acetaminophen 325 mg oral tablet: 2 tab(s) orally every 6 hours, As needed, Temp greater or equal to 38C (100.4F), Mild Pain (1 - 3) (04 Oct 2021 19:05)  aluminum hydroxide-magnesium hydroxide 200 mg-200 mg/5 mL oral suspension: 30 milliliter(s) orally every 4 hours, As needed, Dyspepsia (06 Oct 2021 13:20)  aspirin 81 mg oral tablet, chewable: 1 tab(s) orally once a day (04 Oct 2021 21:14)  Atarax 50 mg oral tablet: 1 tab(s) orally 4 times a day (04 Oct 2021 19:05)  atorvastatin 40 mg oral tablet: 1 tab(s) orally once a day (04 Oct 2021 21:14)  benzonatate 100 mg oral capsule: 1 cap(s) orally 3 times a day (04 Oct 2021 21:14)  Depakote  mg oral tablet, extended release: 2 tab(s) orally once a day (at bedtime) (04 Oct 2021 19:05)  Eliquis 5 mg oral tablet: 1 tab(s) orally 2 times a day (04 Oct 2021 21:14)  Lopressor: 12.5 milligram(s) orally (04 Oct 2021 21:14)  melatonin 3 mg oral tablet: 1 tab(s) orally once a day (at bedtime), As needed, Insomnia (06 Oct 2021 13:20)  mirtazapine 30 mg oral tablet, disintegratin tab(s) orally once a day (at bedtime) (04 Oct 2021 19:05)  Plavix 75 mg oral tablet: 1 tab(s) orally once a day (04 Oct 2021 21:14)  Protonix 40 mg oral delayed release tablet: 1 tab(s) orally once a day (04 Oct 2021 21:14)  zinc sulfate 220 mg oral capsule: 1 cap(s) orally once a day (04 Oct 2021 19:05)      CURRENT CARDIAC MEDICATIONS:      CURRENT OTHER MEDICATIONS:  aspirin 325 milliGRAM(s) Oral Once, Stop order after: 1 Doses      ALLERGIES:   No Known Allergies      REVIEW OF SYMPTOMS:   CONSTITUTIONAL: No fever, no chills, no weight loss, no weight gain, no fatigue   ENMT:  No vertigo; No sinus or throat pain  NECK: No pain or stiffness  CARDIOVASCULAR: No chest pain, no dyspnea, no syncope/presyncope, no palpitations, no dizziness, no Orthopnea, no Paroxsymal nocturnal dyspnea  RESPIRATORY: no Shortness of breath, no cough, no wheezing  : No dysuria, no hematuria   GI: No dark color stool, no nausea, no diarrhea, no constipation, no abdominal pain   NEURO: No headache, no slurred speech   MUSCULOSKELETAL: No joint pain or swelling; No muscle, back, or extremity pain  PSYCH: No agitation, no anxiety.    ALL OTHER REVIEW OF SYSTEMS ARE NEGATIVE.    VITAL SIGNS:  T(C): 37 (22 @ 20:22), Max: 37 (22 @ 20:22)  T(F): 98.6 (22 @ 20:22), Max: 98.6 (22 @ 20:22)  HR: 84 (22 @ 20:22) (84 - 84)  BP: 125/77 (22 @ 20:22) (125/77 - 125/77)  RR: 16 (22 @ 20:22) (16 - 16)  SpO2: 100% (22 @ 20:22) (100% - 100%)    INTAKE AND OUTPUT:       PHYSICAL EXAM:  Constitutional: Comfortable . No acute distress.   HEENT: Atraumatic and normocephalic , neck is supple . no JVD. No carotid bruit.  CNS: A&Ox3. No focal deficits.   Respiratory: CTAB, unlabored   Cardiovascular: RRR normal s1 s2. No murmur. No rubs or gallop.  Gastrointestinal: Soft, non-tender. +Bowel sounds.   Extremities: 2+ Peripheral Pulses, No clubbing, cyanosis, or edema  Psychiatric: Calm . no agitation.   Skin: Warm and dry, no ulcers on extremities     LABS:  ( 09 May 2022 21:47 )  Troponin T  <0.01,  CPK  X    , CKMB  X    , BNP X                                  10.6   9.66  )-----------( 345      ( 09 May 2022 21:47 )             32.1         137  |  102  |  12.8  ----------------------------<  74  3.4<L>   |  22.0  |  0.83    Ca    8.7      09 May 2022 21:47    TPro  6.9  /  Alb  4.0  /  TBili  <0.2<L>  /  DBili  x   /  AST  16  /  ALT  11  /  AlkPhos  136<H>                  INTERPRETATION OF TELEMETRY:     ECG:   Prior ECG: Yes [  ] No [  ]    RADIOLOGY & ADDITIONAL STUDIES:    X-ray:    CT scan:   MRI:   US:                                                [facility]                                              CARDIOLOGY AT [facility]                                                   [address], [place] [address]                                             Telephone: [phone]. Fax:[phone]                                                       CARDIOLOGY CONSULTATION NOTE                                                                                             History obtained by: Patient and medical record  Community Cardiologist: Dr. Vasquez   obtained: Yes [  ] No [  ]  Reason for Consultation: numbness of RUE  Available out pt records reviewed: Yes [X] No [  ]    Chief complaint:  RUE numbness and palpitations    HPI: Patient is a 45yo F w/ PMHx of Asthma, Bipolar, S Lupus, + Covid-19, MI s/p PCI to LAD, LV apical thrombus no longer on Eliquis, came in w/ RUE numbness and palpitations.  States for past 4 days she feels RUE numbness that comes and goes and also has some random palpitations occasionally.  Symptoms all new.  No accident or fall reported.  Takes her meds every day and denies CP, diaphoresis or leg edema.  Came in for evaluation of numbness,   Echo was with normal EF.     CARDIAC TESTING   ECHO: Summary:   1. Endocardial visualization was enhanced with intravenous echo contrast.   2. Normal global left ventricular systolic function.   3. Left ventricular ejection fraction, by visual estimation, is 50 to 55%.   4. Normal right ventricular size and function.   5. The right atrium is normal in size.   6. The left atrium is normal in size.   7. Mild thickening of the anterior and posterior mitral valve leaflets.   8. Mild mitral valve regurgitation.   9. No aortic valve stenosis.  10. There is no evidence of pericardial effusion.  11. Recommend clinical correlation with the above findings.    Amber Villeda MD Electronically signed on 2021     CATH: CORONARY VESSELS: The coronary circulation is right dominant.  LM:   --  LM: Normal.  LAD:   --  LAD: There was a 0 % stenosis in-stent.  CX:   --  Circumflex: Normal.  RCA:   --  RCA: Angiography showed mild atherosclerosis with no flow limiting lesions.  COMPLICATIONS: No complications occurred during the cath lab visit.  DIAGNOSTIC IMPRESSIONS: Patent LAD stent.  DIAGNOSTIC RECOMMENDATIONS: Medical management.  Prepared and signed by    Reymundo Cleary MD Signed 10/05/2021 19:43:16    PAST MEDICAL HISTORY  Lupus  Bipolar 1 disorder, depressed  Asthma  Ectopic pregnancy    History of heart murmur in childhood  Palpitations  Thyroid nodule  GERD (gastroesophageal reflux disease)  CAD (coronary atherosclerotic disease)    PAST SURGICAL HISTORY  H/O oophorectomy  S/P tonsillectomy  Encounter for post Essure sterilization check    SOCIAL HISTORY:  Denies smoking/alcohol/drugs    FAMILY HISTORY:  Family history of cancer in father  Family history of hypothyroidism  FH: HTN (hypertension) (Mother)  FH: hyperlipidemia (Mother)    Family History of Cardiovascular Disease:  Yes [  ] No [  ]  Coronary Artery Disease in first degree relative: Yes [  ] No [  ]  Sudden Cardiac Death in First degree relative: Yes [  ] No [  ]    HOME MEDICATIONS:  Abilify 20 mg oral tablet: 1 tab(s) orally once a day (at bedtime) (04 Oct 2021 19:05)  acetaminophen 325 mg oral tablet: 2 tab(s) orally every 6 hours, As needed, Temp greater or equal to 38C (100.4F), Mild Pain (1 - 3) (04 Oct 2021 19:05)  aluminum hydroxide-magnesium hydroxide 200 mg-200 mg/5 mL oral suspension: 30 milliliter(s) orally every 4 hours, As needed, Dyspepsia (06 Oct 2021 13:20)  aspirin 81 mg oral tablet, chewable: 1 tab(s) orally once a day (04 Oct 2021 21:14)  Atarax 50 mg oral tablet: 1 tab(s) orally 4 times a day (04 Oct 2021 19:05)  atorvastatin 40 mg oral tablet: 1 tab(s) orally once a day (04 Oct 2021 21:14)  benzonatate 100 mg oral capsule: 1 cap(s) orally 3 times a day (04 Oct 2021 21:14)  Depakote  mg oral tablet, extended release: 2 tab(s) orally once a day (at bedtime) (04 Oct 2021 19:05)  Eliquis 5 mg oral tablet: 1 tab(s) orally 2 times a day (04 Oct 2021 21:14)  Lopressor: 12.5 milligram(s) orally (04 Oct 2021 21:14)  melatonin 3 mg oral tablet: 1 tab(s) orally once a day (at bedtime), As needed, Insomnia (06 Oct 2021 13:20)  mirtazapine 30 mg oral tablet, disintegratin tab(s) orally once a day (at bedtime) (04 Oct 2021 19:05)  Plavix 75 mg oral tablet: 1 tab(s) orally once a day (04 Oct 2021 21:14)  Protonix 40 mg oral delayed release tablet: 1 tab(s) orally once a day (04 Oct 2021 21:14)  zinc sulfate 220 mg oral capsule: 1 cap(s) orally once a day (04 Oct 2021 19:05)    CURRENT CARDIAC MEDICATIONS:    CURRENT OTHER MEDICATIONS:  aspirin 325 milliGRAM(s) Oral Once, Stop order after: 1 Doses    ALLERGIES: No Known Allergies    REVIEW OF SYMPTOMS:   CONSTITUTIONAL: No fever, no chills, no weight loss, no weight gain, no fatigue   ENMT:  No vertigo; No sinus or throat pain  NECK: No pain or stiffness  CARDIOVASCULAR: No chest pain, no dyspnea, no syncope/presyncope, + palpitations, no dizziness, no Orthopnea, no Paroxsymal nocturnal dyspnea  RESPIRATORY: no Shortness of breath, no cough, no wheezing  : No dysuria, no hematuria   GI: No dark color stool, no nausea, no diarrhea, no constipation, no abdominal pain   NEURO: No headache, RUE numbness, no slurred speech   MUSCULOSKELETAL: No joint pain or swelling; No muscle, back, or extremity pain  PSYCH: No agitation, no anxiety.    ALL OTHER REVIEW OF SYSTEMS ARE NEGATIVE.    VITAL SIGNS:  T(C): 37 (22 @ 20:22), Max: 37 (22 @ 20:22)  T(F): 98.6 (22 @ 20:22), Max: 98.6 (22 @ 20:22)  HR: 84 (22 @ 20:22) (84 - 84)  BP: 125/77 (22 @ 20:22) (125/77 - 125/77)  RR: 16 (22 @ 20:22) (16 - 16)  SpO2: 100% (22 @ 20:22) (100% - 100%)    INTAKE AND OUTPUT:     PHYSICAL EXAM:  Constitutional: Comfortable, no acute distress  HEENT: Atraumatic and normocephalic, neck is supple, no JVD  CNS: A&Ox3. No focal deficits   Respiratory: CTAB, unlabored   Cardiovascular: RRR normal S1S2 No murmur, No rubs or gallop  Gastrointestinal: Soft, non-tender +Bowel sounds  Extremities: 2+ Peripheral Pulses, No clubbing, cyanosis, or edema  Psychiatric: Calm, no agitation   Skin: Warm and dry, no ulcers on extremities     LABS:  ( 09 May 2022 21:47 )  Troponin T  <0.01,  CPK  X    , CKMB  X    , BNP X                             10.6   9.66  )-----------( 345      ( 09 May 2022 21:47 )             32.1         137  |  102  |  12.8  ----------------------------<  74  3.4<L>   |  22.0  |  0.83    Ca    8.7      09 May 2022 21:47    TPro  6.9  /  Alb  4.0  /  TBili  <0.2<L>  /  DBili  x   /  AST  16  /  ALT  11  /  AlkPhos  136<H>      INTERPRETATION OF TELEMETRY:   ECG:   Prior ECG: Yes [X] No [  ]    RADIOLOGY & ADDITIONAL STUDIES:    X-ray:    CT scan:                                                [facility]                                              CARDIOLOGY AT [facility]                                                   [address], [place] [address]                                             Telephone: [phone]. Fax:[phone]                                                       CARDIOLOGY CONSULTATION NOTE                                                                                             History obtained by: Patient and medical record  Community Cardiologist: Dr. Vasquez   obtained: Yes [  ] No [  ]  Reason for Consultation: numbness of RUE  Available out pt records reviewed: Yes [X] No [  ]    Chief complaint:  RUE numbness and palpitations    HPI: Patient is a 45yo F w/ PMHx of Asthma, Bipolar, S Lupus, + Covid-19, MI s/p PCI to LAD, LV apical thrombus no longer on Eliquis, came in w/ RUE numbness and palpitations.  States for past 4 days she feels RUE numbness that comes and goes and also has some random palpitations occasionally.  Symptoms all new.  No accident or fall reported.  Takes her meds every day and denies CP, diaphoresis or leg edema.  Came in for evaluation of numbness,   Echo was with normal EF.     CARDIAC TESTING   ECHO: Summary:   1. Endocardial visualization was enhanced with intravenous echo contrast.   2. Normal global left ventricular systolic function.   3. Left ventricular ejection fraction, by visual estimation, is 50 to 55%.   4. Normal right ventricular size and function.   5. The right atrium is normal in size.   6. The left atrium is normal in size.   7. Mild thickening of the anterior and posterior mitral valve leaflets.   8. Mild mitral valve regurgitation.   9. No aortic valve stenosis.  10. There is no evidence of pericardial effusion.  11. Recommend clinical correlation with the above findings.    Amber Villeda MD Electronically signed on 2021     CATH: CORONARY VESSELS: The coronary circulation is right dominant.  LM:   --  LM: Normal.  LAD:   --  LAD: There was a 0 % stenosis in-stent.  CX:   --  Circumflex: Normal.  RCA:   --  RCA: Angiography showed mild atherosclerosis with no flow limiting lesions.  COMPLICATIONS: No complications occurred during the cath lab visit.  DIAGNOSTIC IMPRESSIONS: Patent LAD stent.  DIAGNOSTIC RECOMMENDATIONS: Medical management.  Prepared and signed by    Reymundo Cleary MD Signed 10/05/2021 19:43:16    PAST MEDICAL HISTORY  Lupus  Bipolar 1 disorder, depressed  Asthma  Ectopic pregnancy    History of heart murmur in childhood  Palpitations  Thyroid nodule  GERD (gastroesophageal reflux disease)  CAD (coronary atherosclerotic disease)    PAST SURGICAL HISTORY  H/O oophorectomy  S/P tonsillectomy  Encounter for post Essure sterilization check    SOCIAL HISTORY:  Denies smoking/alcohol/drugs    FAMILY HISTORY:  Family history of cancer in father  Family history of hypothyroidism  FH: HTN (hypertension) (Mother)  FH: hyperlipidemia (Mother)    Family History of Cardiovascular Disease:  Yes [  ] No [  ]  Coronary Artery Disease in first degree relative: Yes [  ] No [  ]  Sudden Cardiac Death in First degree relative: Yes [  ] No [  ]    HOME MEDICATIONS:  Abilify 20 mg oral tablet: 1 tab(s) orally once a day (at bedtime) (04 Oct 2021 19:05)  acetaminophen 325 mg oral tablet: 2 tab(s) orally every 6 hours, As needed, Temp greater or equal to 38C (100.4F), Mild Pain (1 - 3) (04 Oct 2021 19:05)  aluminum hydroxide-magnesium hydroxide 200 mg-200 mg/5 mL oral suspension: 30 milliliter(s) orally every 4 hours, As needed, Dyspepsia (06 Oct 2021 13:20)  aspirin 81 mg oral tablet, chewable: 1 tab(s) orally once a day (04 Oct 2021 21:14)  Atarax 50 mg oral tablet: 1 tab(s) orally 4 times a day (04 Oct 2021 19:05)  atorvastatin 40 mg oral tablet: 1 tab(s) orally once a day (04 Oct 2021 21:14)  benzonatate 100 mg oral capsule: 1 cap(s) orally 3 times a day (04 Oct 2021 21:14)  Depakote  mg oral tablet, extended release: 2 tab(s) orally once a day (at bedtime) (04 Oct 2021 19:05)  Eliquis 5 mg oral tablet: 1 tab(s) orally 2 times a day (04 Oct 2021 21:14)  Lopressor: 12.5 milligram(s) orally (04 Oct 2021 21:14)  melatonin 3 mg oral tablet: 1 tab(s) orally once a day (at bedtime), As needed, Insomnia (06 Oct 2021 13:20)  mirtazapine 30 mg oral tablet, disintegratin tab(s) orally once a day (at bedtime) (04 Oct 2021 19:05)  Plavix 75 mg oral tablet: 1 tab(s) orally once a day (04 Oct 2021 21:14)  Protonix 40 mg oral delayed release tablet: 1 tab(s) orally once a day (04 Oct 2021 21:14)  zinc sulfate 220 mg oral capsule: 1 cap(s) orally once a day (04 Oct 2021 19:05)    CURRENT CARDIAC MEDICATIONS:    CURRENT OTHER MEDICATIONS:  aspirin 325 milliGRAM(s) Oral Once, Stop order after: 1 Doses    ALLERGIES: No Known Allergies    REVIEW OF SYMPTOMS:   CONSTITUTIONAL: No fever, no chills, no weight loss, no weight gain, no fatigue   ENMT:  No vertigo; No sinus or throat pain  NECK: No pain or stiffness  CARDIOVASCULAR: No chest pain, no dyspnea, no syncope/presyncope, + palpitations, no dizziness, no Orthopnea, no Paroxsymal nocturnal dyspnea  RESPIRATORY: no Shortness of breath, no cough, no wheezing  : No dysuria, no hematuria   GI: No dark color stool, no nausea, no diarrhea, no constipation, no abdominal pain   NEURO: No headache, RUE numbness, no slurred speech   MUSCULOSKELETAL: No joint pain or swelling; No muscle, back, or extremity pain  PSYCH: No agitation, no anxiety.    ALL OTHER REVIEW OF SYSTEMS ARE NEGATIVE.    VITAL SIGNS:  T(C): 37 (22 @ 20:22), Max: 37 (22 @ 20:22)  T(F): 98.6 (22 @ 20:22), Max: 98.6 (22 @ 20:22)  HR: 84 (22 @ 20:22) (84 - 84)  BP: 125/77 (22 @ 20:22) (125/77 - 125/77)  RR: 16 (22 @ 20:22) (16 - 16)  SpO2: 100% (22 @ 20:22) (100% - 100%)    INTAKE AND OUTPUT:     PHYSICAL EXAM:  Constitutional: Comfortable and no acute distress  HEENT: Atraumatic and normocephalic, neck is supple, no JVD  CNS: A&Ox3. No focal motor or sensory deficits, gait intact   Respiratory: CTAB, unlabored   Cardiovascular: RRR normal S1S2 No murmur, No rubs or gallop  Gastrointestinal: Soft, non-tender +Bowel sounds  Extremities: 2+ Peripheral Pulses, no clubbing, cyanosis, or edema, hand  intact  Psychiatric: Calm, no agitation   Skin: Warm and dry, no ulcers on extremities     LABS:  ( 09 May 2022 21:47 )  Troponin T  <0.01,  CPK  X    , CKMB  X    , BNP X                             10.6   9.66  )-----------( 345      ( 09 May 2022 21:47 )             32.1         137  |  102  |  12.8  ----------------------------<  74  3.4<L>   |  22.0  |  0.83    Ca    8.7      09 May 2022 21:47    TPro  6.9  /  Alb  4.0  /  TBili  <0.2<L>  /  DBili  x   /  AST  16  /  ALT  11  /  AlkPhos  136<H>      INTERPRETATION OF TELEMETRY: Sinus no ectopy  ECG: NSR no acute T or ST changes  Prior ECG: Yes [X] No [  ]

## 2022-05-09 NOTE — CONSULT NOTE ADULT - ASSESSMENT
45yo F w/ Lupus, MI PCI to LAD, LV apical thrombus treated and no longer on Eliquis, came in w/ RUE numbness and palpitations. Currently asymptomatic and CEx1 negative

## 2022-05-09 NOTE — ED ADULT NURSE NOTE - OBJECTIVE STATEMENT
PT presents to ED c/o right upper extremity numbness and tingling that started 2 days ago. PT states she is a pt of dr rivera.  Recently had stent placed. States symptoms feel the same as when she had her stent placed.  Denies pain at this time. PT pending labs and radiology. CTM

## 2022-05-09 NOTE — ED PROVIDER NOTE - ATTENDING CONTRIBUTION TO CARE
The patient seen and examined    R Arm Paresthesia  Atypical chest pain    I, Jordi Whitehead, performed the initial face to face bedside interview with this patient regarding history of present illness, review of symptoms and relevant past medical, social and family history.  I completed an independent physical examination.  I was the initial provider who evaluated this patient. I have signed out the follow up of any pending tests (i.e. labs, radiological studies) to the resident.  I have communicated the patient’s plan of care and disposition with the resident

## 2022-05-09 NOTE — ED PROVIDER NOTE - CLINICAL SUMMARY MEDICAL DECISION MAKING FREE TEXT BOX
The patient with CAD with MI with stent presents with chest pain and will admit for further evaluation

## 2022-05-09 NOTE — ED ADULT NURSE NOTE - CHIEF COMPLAINT QUOTE
Ambulatory patient of MD White reporting 3-4 days of intermittent "chest fluttering and pain down her R arm." EKG completed prior to triage. PMH NSTEMI with 1 stent on 81mg ASA. Also reports that she is having increased GERD which is another symptom she had with her previous MI. Actual weight in kg's taken in triage.

## 2022-05-10 ENCOUNTER — TRANSCRIPTION ENCOUNTER (OUTPATIENT)
Age: 47
End: 2022-05-10

## 2022-05-10 VITALS
RESPIRATION RATE: 17 BRPM | HEART RATE: 55 BPM | SYSTOLIC BLOOD PRESSURE: 116 MMHG | DIASTOLIC BLOOD PRESSURE: 70 MMHG | OXYGEN SATURATION: 99 %

## 2022-05-10 DIAGNOSIS — I25.10 ATHEROSCLEROTIC HEART DISEASE OF NATIVE CORONARY ARTERY WITHOUT ANGINA PECTORIS: ICD-10-CM

## 2022-05-10 DIAGNOSIS — R00.2 PALPITATIONS: ICD-10-CM

## 2022-05-10 LAB
A1C WITH ESTIMATED AVERAGE GLUCOSE RESULT: 5.4 % — SIGNIFICANT CHANGE UP (ref 4–5.6)
ALBUMIN SERPL ELPH-MCNC: 3.7 G/DL — SIGNIFICANT CHANGE UP (ref 3.3–5.2)
ALP SERPL-CCNC: 119 U/L — SIGNIFICANT CHANGE UP (ref 40–120)
ALT FLD-CCNC: 9 U/L — SIGNIFICANT CHANGE UP
ANION GAP SERPL CALC-SCNC: 10 MMOL/L — SIGNIFICANT CHANGE UP (ref 5–17)
AST SERPL-CCNC: 12 U/L — SIGNIFICANT CHANGE UP
BASOPHILS # BLD AUTO: 0.04 K/UL — SIGNIFICANT CHANGE UP (ref 0–0.2)
BASOPHILS NFR BLD AUTO: 0.5 % — SIGNIFICANT CHANGE UP (ref 0–2)
BILIRUB SERPL-MCNC: <0.2 MG/DL — LOW (ref 0.4–2)
BUN SERPL-MCNC: 12.5 MG/DL — SIGNIFICANT CHANGE UP (ref 8–20)
CALCIUM SERPL-MCNC: 8.6 MG/DL — SIGNIFICANT CHANGE UP (ref 8.6–10.2)
CHLORIDE SERPL-SCNC: 103 MMOL/L — SIGNIFICANT CHANGE UP (ref 98–107)
CHOLEST SERPL-MCNC: 146 MG/DL — SIGNIFICANT CHANGE UP
CO2 SERPL-SCNC: 22 MMOL/L — SIGNIFICANT CHANGE UP (ref 22–29)
CREAT SERPL-MCNC: 0.67 MG/DL — SIGNIFICANT CHANGE UP (ref 0.5–1.3)
EGFR: 107 ML/MIN/1.73M2 — SIGNIFICANT CHANGE UP
EOSINOPHIL # BLD AUTO: 0.11 K/UL — SIGNIFICANT CHANGE UP (ref 0–0.5)
EOSINOPHIL NFR BLD AUTO: 1.3 % — SIGNIFICANT CHANGE UP (ref 0–6)
ESTIMATED AVERAGE GLUCOSE: 108 MG/DL — SIGNIFICANT CHANGE UP (ref 68–114)
GLUCOSE SERPL-MCNC: 99 MG/DL — SIGNIFICANT CHANGE UP (ref 70–99)
HCT VFR BLD CALC: 29 % — LOW (ref 34.5–45)
HDLC SERPL-MCNC: 43 MG/DL — LOW
HGB BLD-MCNC: 9.5 G/DL — LOW (ref 11.5–15.5)
IMM GRANULOCYTES NFR BLD AUTO: 0.2 % — SIGNIFICANT CHANGE UP (ref 0–1.5)
INR BLD: 0.99 RATIO — SIGNIFICANT CHANGE UP (ref 0.88–1.16)
LACTATE SERPL-SCNC: 1.4 MMOL/L — SIGNIFICANT CHANGE UP (ref 0.5–2)
LIPID PNL WITH DIRECT LDL SERPL: 66 MG/DL — SIGNIFICANT CHANGE UP
LYMPHOCYTES # BLD AUTO: 4.12 K/UL — HIGH (ref 1–3.3)
LYMPHOCYTES # BLD AUTO: 47 % — HIGH (ref 13–44)
MCHC RBC-ENTMCNC: 27.5 PG — SIGNIFICANT CHANGE UP (ref 27–34)
MCHC RBC-ENTMCNC: 32.8 GM/DL — SIGNIFICANT CHANGE UP (ref 32–36)
MCV RBC AUTO: 83.8 FL — SIGNIFICANT CHANGE UP (ref 80–100)
MONOCYTES # BLD AUTO: 0.64 K/UL — SIGNIFICANT CHANGE UP (ref 0–0.9)
MONOCYTES NFR BLD AUTO: 7.3 % — SIGNIFICANT CHANGE UP (ref 2–14)
NEUTROPHILS # BLD AUTO: 3.84 K/UL — SIGNIFICANT CHANGE UP (ref 1.8–7.4)
NEUTROPHILS NFR BLD AUTO: 43.7 % — SIGNIFICANT CHANGE UP (ref 43–77)
NON HDL CHOLESTEROL: 103 MG/DL — SIGNIFICANT CHANGE UP
PLATELET # BLD AUTO: 287 K/UL — SIGNIFICANT CHANGE UP (ref 150–400)
POTASSIUM SERPL-MCNC: 3.9 MMOL/L — SIGNIFICANT CHANGE UP (ref 3.5–5.3)
POTASSIUM SERPL-SCNC: 3.9 MMOL/L — SIGNIFICANT CHANGE UP (ref 3.5–5.3)
PROT SERPL-MCNC: 6.2 G/DL — LOW (ref 6.6–8.7)
PROTHROM AB SERPL-ACNC: 11.5 SEC — SIGNIFICANT CHANGE UP (ref 10.5–13.4)
RBC # BLD: 3.46 M/UL — LOW (ref 3.8–5.2)
RBC # FLD: 17.8 % — HIGH (ref 10.3–14.5)
SODIUM SERPL-SCNC: 135 MMOL/L — SIGNIFICANT CHANGE UP (ref 135–145)
T3 SERPL-MCNC: 114 NG/DL — SIGNIFICANT CHANGE UP (ref 80–200)
T4 AB SER-ACNC: 7.1 UG/DL — SIGNIFICANT CHANGE UP (ref 4.5–12)
TRIGL SERPL-MCNC: 186 MG/DL — HIGH
TROPONIN T SERPL-MCNC: <0.01 NG/ML — SIGNIFICANT CHANGE UP (ref 0–0.06)
TROPONIN T SERPL-MCNC: <0.01 NG/ML — SIGNIFICANT CHANGE UP (ref 0–0.06)
TSH SERPL-MCNC: 3 UIU/ML — SIGNIFICANT CHANGE UP (ref 0.27–4.2)
WBC # BLD: 8.77 K/UL — SIGNIFICANT CHANGE UP (ref 3.8–10.5)
WBC # FLD AUTO: 8.77 K/UL — SIGNIFICANT CHANGE UP (ref 3.8–10.5)

## 2022-05-10 PROCEDURE — 99222 1ST HOSP IP/OBS MODERATE 55: CPT

## 2022-05-10 PROCEDURE — 12345: CPT | Mod: NC

## 2022-05-10 PROCEDURE — 72125 CT NECK SPINE W/O DYE: CPT | Mod: 26

## 2022-05-10 PROCEDURE — 93010 ELECTROCARDIOGRAM REPORT: CPT

## 2022-05-10 PROCEDURE — 99236 HOSP IP/OBS SAME DATE HI 85: CPT

## 2022-05-10 RX ORDER — MIRTAZAPINE 45 MG/1
1 TABLET, ORALLY DISINTEGRATING ORAL
Qty: 0 | Refills: 0 | DISCHARGE

## 2022-05-10 RX ORDER — ATORVASTATIN CALCIUM 80 MG/1
1 TABLET, FILM COATED ORAL
Qty: 0 | Refills: 0 | DISCHARGE

## 2022-05-10 RX ORDER — METOPROLOL TARTRATE 50 MG
0 TABLET ORAL
Qty: 0 | Refills: 0 | DISCHARGE

## 2022-05-10 RX ORDER — ERGOCALCIFEROL 1.25 MG/1
0 CAPSULE ORAL
Qty: 0 | Refills: 0 | DISCHARGE

## 2022-05-10 RX ORDER — METOPROLOL TARTRATE 50 MG
12.5 TABLET ORAL
Refills: 0 | Status: DISCONTINUED | OUTPATIENT
Start: 2022-05-10 | End: 2022-05-10

## 2022-05-10 RX ORDER — CLOPIDOGREL BISULFATE 75 MG/1
75 TABLET, FILM COATED ORAL DAILY
Refills: 0 | Status: DISCONTINUED | OUTPATIENT
Start: 2022-05-10 | End: 2022-05-10

## 2022-05-10 RX ORDER — SODIUM CHLORIDE 9 MG/ML
1000 INJECTION, SOLUTION INTRAVENOUS ONCE
Refills: 0 | Status: COMPLETED | OUTPATIENT
Start: 2022-05-10 | End: 2022-05-10

## 2022-05-10 RX ORDER — ASPIRIN/CALCIUM CARB/MAGNESIUM 324 MG
81 TABLET ORAL DAILY
Refills: 0 | Status: DISCONTINUED | OUTPATIENT
Start: 2022-05-10 | End: 2022-05-10

## 2022-05-10 RX ORDER — PANTOPRAZOLE SODIUM 20 MG/1
1 TABLET, DELAYED RELEASE ORAL
Qty: 0 | Refills: 0 | DISCHARGE

## 2022-05-10 RX ORDER — ALBUTEROL 90 UG/1
2 AEROSOL, METERED ORAL EVERY 6 HOURS
Refills: 0 | Status: DISCONTINUED | OUTPATIENT
Start: 2022-05-10 | End: 2022-05-10

## 2022-05-10 RX ORDER — FERROUS SULFATE 325(65) MG
325 TABLET ORAL DAILY
Refills: 0 | Status: DISCONTINUED | OUTPATIENT
Start: 2022-05-10 | End: 2022-05-10

## 2022-05-10 RX ORDER — ARIPIPRAZOLE 15 MG/1
1 TABLET ORAL
Qty: 0 | Refills: 0 | DISCHARGE

## 2022-05-10 RX ORDER — IBUPROFEN 200 MG
600 TABLET ORAL EVERY 6 HOURS
Refills: 0 | Status: DISCONTINUED | OUTPATIENT
Start: 2022-05-10 | End: 2022-05-10

## 2022-05-10 RX ORDER — METOPROLOL TARTRATE 50 MG
12.5 TABLET ORAL
Qty: 0 | Refills: 0 | DISCHARGE

## 2022-05-10 RX ORDER — HYDROXYZINE HCL 10 MG
1 TABLET ORAL
Qty: 0 | Refills: 0 | DISCHARGE

## 2022-05-10 RX ORDER — ALBUTEROL 90 UG/1
0 AEROSOL, METERED ORAL
Qty: 0 | Refills: 0 | DISCHARGE

## 2022-05-10 RX ORDER — METOPROLOL TARTRATE 50 MG
25 TABLET ORAL
Refills: 0 | Status: DISCONTINUED | OUTPATIENT
Start: 2022-05-10 | End: 2022-05-10

## 2022-05-10 RX ORDER — ACETAMINOPHEN 500 MG
650 TABLET ORAL EVERY 6 HOURS
Refills: 0 | Status: DISCONTINUED | OUTPATIENT
Start: 2022-05-10 | End: 2022-05-10

## 2022-05-10 RX ORDER — CARIPRAZINE 1.5 MG/1
0 CAPSULE, GELATIN COATED ORAL
Qty: 0 | Refills: 0 | DISCHARGE

## 2022-05-10 RX ORDER — ATORVASTATIN CALCIUM 80 MG/1
0 TABLET, FILM COATED ORAL
Qty: 0 | Refills: 0 | DISCHARGE

## 2022-05-10 RX ORDER — ENOXAPARIN SODIUM 100 MG/ML
40 INJECTION SUBCUTANEOUS EVERY 24 HOURS
Refills: 0 | Status: DISCONTINUED | OUTPATIENT
Start: 2022-05-10 | End: 2022-05-10

## 2022-05-10 RX ORDER — METHOCARBAMOL 500 MG/1
1500 TABLET, FILM COATED ORAL
Refills: 0 | Status: DISCONTINUED | OUTPATIENT
Start: 2022-05-10 | End: 2022-05-10

## 2022-05-10 RX ORDER — CLOPIDOGREL BISULFATE 75 MG/1
0 TABLET, FILM COATED ORAL
Qty: 0 | Refills: 0 | DISCHARGE

## 2022-05-10 RX ORDER — MIRTAZAPINE 45 MG/1
30 TABLET, ORALLY DISINTEGRATING ORAL AT BEDTIME
Refills: 0 | Status: DISCONTINUED | OUTPATIENT
Start: 2022-05-10 | End: 2022-05-10

## 2022-05-10 RX ORDER — DIVALPROEX SODIUM 500 MG/1
2 TABLET, DELAYED RELEASE ORAL
Qty: 0 | Refills: 0 | DISCHARGE

## 2022-05-10 RX ORDER — CLOPIDOGREL BISULFATE 75 MG/1
1 TABLET, FILM COATED ORAL
Qty: 0 | Refills: 0 | DISCHARGE

## 2022-05-10 RX ORDER — ATORVASTATIN CALCIUM 80 MG/1
40 TABLET, FILM COATED ORAL DAILY
Refills: 0 | Status: DISCONTINUED | OUTPATIENT
Start: 2022-05-10 | End: 2022-05-10

## 2022-05-10 RX ORDER — APIXABAN 2.5 MG/1
1 TABLET, FILM COATED ORAL
Qty: 0 | Refills: 0 | DISCHARGE

## 2022-05-10 RX ORDER — ONDANSETRON 8 MG/1
4 TABLET, FILM COATED ORAL EVERY 8 HOURS
Refills: 0 | Status: DISCONTINUED | OUTPATIENT
Start: 2022-05-10 | End: 2022-05-10

## 2022-05-10 RX ORDER — FERROUS SULFATE 325(65) MG
0 TABLET ORAL
Qty: 0 | Refills: 0 | DISCHARGE

## 2022-05-10 RX ORDER — PANTOPRAZOLE SODIUM 20 MG/1
0 TABLET, DELAYED RELEASE ORAL
Qty: 0 | Refills: 0 | DISCHARGE

## 2022-05-10 RX ORDER — ASPIRIN/CALCIUM CARB/MAGNESIUM 324 MG
1 TABLET ORAL
Qty: 0 | Refills: 0 | DISCHARGE

## 2022-05-10 RX ORDER — CARIPRAZINE 1.5 MG/1
3 CAPSULE, GELATIN COATED ORAL DAILY
Refills: 0 | Status: DISCONTINUED | OUTPATIENT
Start: 2022-05-10 | End: 2022-05-10

## 2022-05-10 RX ORDER — ASPIRIN/CALCIUM CARB/MAGNESIUM 324 MG
0 TABLET ORAL
Qty: 0 | Refills: 0 | DISCHARGE

## 2022-05-10 RX ORDER — LIDOCAINE 4 G/100G
1 CREAM TOPICAL DAILY
Refills: 0 | Status: DISCONTINUED | OUTPATIENT
Start: 2022-05-10 | End: 2022-05-10

## 2022-05-10 RX ORDER — PANTOPRAZOLE SODIUM 20 MG/1
40 TABLET, DELAYED RELEASE ORAL
Refills: 0 | Status: DISCONTINUED | OUTPATIENT
Start: 2022-05-10 | End: 2022-05-10

## 2022-05-10 RX ADMIN — CLOPIDOGREL BISULFATE 75 MILLIGRAM(S): 75 TABLET, FILM COATED ORAL at 11:04

## 2022-05-10 RX ADMIN — METHOCARBAMOL 1500 MILLIGRAM(S): 500 TABLET, FILM COATED ORAL at 11:14

## 2022-05-10 RX ADMIN — ATORVASTATIN CALCIUM 40 MILLIGRAM(S): 80 TABLET, FILM COATED ORAL at 11:12

## 2022-05-10 RX ADMIN — METHOCARBAMOL 1500 MILLIGRAM(S): 500 TABLET, FILM COATED ORAL at 05:18

## 2022-05-10 RX ADMIN — ENOXAPARIN SODIUM 40 MILLIGRAM(S): 100 INJECTION SUBCUTANEOUS at 11:04

## 2022-05-10 RX ADMIN — SODIUM CHLORIDE 1000 MILLILITER(S): 9 INJECTION, SOLUTION INTRAVENOUS at 05:05

## 2022-05-10 RX ADMIN — CARIPRAZINE 3 MILLIGRAM(S): 1.5 CAPSULE, GELATIN COATED ORAL at 11:06

## 2022-05-10 RX ADMIN — Medication 81 MILLIGRAM(S): at 11:05

## 2022-05-10 RX ADMIN — LIDOCAINE 1 PATCH: 4 CREAM TOPICAL at 11:12

## 2022-05-10 RX ADMIN — Medication 325 MILLIGRAM(S): at 11:04

## 2022-05-10 RX ADMIN — Medication 25 MILLIGRAM(S): at 05:19

## 2022-05-10 RX ADMIN — PANTOPRAZOLE SODIUM 40 MILLIGRAM(S): 20 TABLET, DELAYED RELEASE ORAL at 05:19

## 2022-05-10 NOTE — DISCHARGE NOTE NURSING/CASE MANAGEMENT/SOCIAL WORK - NSDCVIVACCINE_GEN_ALL_CORE_FT
Tdap; 11-Oct-2021 01:03; Aquilino Dominguez (RN); [employer]; [id number] (Exp. Date: 15-Jul-2023); IntraMuscular; Deltoid Left.; 0.5 milliLiter(s); VIS (VIS Published: 09-May-2013, VIS Presented: 11-Oct-2021);

## 2022-05-10 NOTE — DISCHARGE NOTE PROVIDER - HOSPITAL COURSE
47 yo female with PMH of Asthma, Bipolar, lupus, h/o MI 2021 s/p PCI to lad, LV apical thrombus no longer on AC presented to the ED for rt arm pain, numbness and palpitation for 4 days on/off. Admitted for telemonitoring and cardiac work up. ays on /off. EKG and trops negative, CT head negative for cva. NO arrhythmias noted on tele. Seen in consult by cardiology, no further recommendations, signed off.  Remainder of hospital course uncomplicated. Now stable for discharge home with outpatient cardio follow up.

## 2022-05-10 NOTE — DISCHARGE NOTE PROVIDER - ATTENDING DISCHARGE PHYSICAL EXAMINATION:
PHYSICAL EXAM:  Vital Signs Last 24 Hrs  T(F): 98.6 (09 May 2022 20:22), Max: 98.6 (09 May 2022 20:22)  HR: 66 (10 May 2022 07:27) (66 - 84)  BP: 105/52 (10 May 2022 07:27) (105/52 - 128/78)  RR: 20 (10 May 2022 07:27) (16 - 20)  SpO2: 98% (10 May 2022 07:27) (97% - 100%)    GENERAL: NAD, Resting in bed  Eyes: EOMI, PERRLA  ENMT: Conjunctiva and sclera clear; supple neck, No JVD  Cardiovascular: S1,S2, RRR, No murmur  Respiratory: CTA B/L, Non-labored breathing  GI: Bowel sounds present; Soft, Nontender, Nondistended. No hepatomegaly  Genitourinary: Deferred  Skin:  no breakdowns, ulcers or discharge, No rashes or lesions  Neurology: Alert & Oriented X3, non-focal and spontaneous movements of all extremities, CN 2 to 12 grossly intact   Psych: Appropriate mood and affect, calm, pleasant, Responds appropriately to questions

## 2022-05-10 NOTE — DISCHARGE NOTE NURSING/CASE MANAGEMENT/SOCIAL WORK - PATIENT PORTAL LINK FT
You can access the FollowMyHealth Patient Portal offered by [facility] by registering at the following website: [email or web address]. By joining [email or web address]’s FollowMyHealth portal, you will also be able to view your health information using other applications (apps) compatible with our system.

## 2022-05-10 NOTE — H&P ADULT - HISTORY OF PRESENT ILLNESS
Patient is a 45 yo female with PMH of Asthma, Bipolar, lupus, h/o MI 2021 s/p PCI to lad, LV apical thrombus no longer on AC presented to the ED for rt arm pain, numbness and palpitation for 4 days on /off. denies chest pain of SOB. not associated with exertion, symptoms does not occur at sleep but she states that she has severe insomnia. denies any falls or trauma.  in the ED, ekg unchanged from prior, initial trops negative. admitted for telemonitoring and cardiac work up. Patient is a 47 yo female with PMH of Asthma, Bipolar, lupus, h/o MI 2021 s/p PCI to lad, LV apical thrombus no longer on AC presented to the ED for rt arm pain, numbness and palpitation for 4 days on /off. denies chest pain or SOB. not associated with exertion, symptoms does not occur at sleep but she states that she has severe insomnia. denies any falls or trauma.  in the ED, ekg unchanged from prior, initial trops negative. admitted for telemonitoring and cardiac work up.

## 2022-05-10 NOTE — DISCHARGE NOTE PROVIDER - CARE PROVIDER_API CALL
Leoncio Vasquez)  Cardiovascular Disease  [address], [place] [address]  Phone: [phone]  Fax: [phone]  Follow Up Time: 2 weeks   Leoncio Vasquez)  Cardiovascular Disease  [address], [address]  [place] [address]  Phone: [phone]  Fax: [phone]  Follow Up Time: 2 weeks    Marleen Luz ()  EEGEpilepsy; Neurology  [address] [place] [address]  Phone: [phone]  Fax: [phone]  Follow Up Time: 1 week

## 2022-05-10 NOTE — DISCHARGE NOTE NURSING/CASE MANAGEMENT/SOCIAL WORK - NSDCPEFALRISK_GEN_ALL_CORE
For information on Fall & Injury Prevention, visit: [email or web address] OR  [email or web address] OR  https://www.cdc.gov/steadi/patient.html

## 2022-05-10 NOTE — H&P ADULT - NSHPPHYSICALEXAM_GEN_ALL_CORE
Vital Signs Last 24 Hrs  T(C): 37 (09 May 2022 20:22), Max: 37 (09 May 2022 20:22)  T(F): 98.6 (09 May 2022 20:22), Max: 98.6 (09 May 2022 20:22)  HR: 77 (10 May 2022 04:24) (68 - 84)  BP: 128/78 (10 May 2022 04:24) (125/77 - 128/78)  BP(mean): --  RR: 18 (10 May 2022 04:24) (16 - 18)  SpO2: 97% (10 May 2022 04:24) (97% - 100%)      PHYSICAL EXAM:  GENERAL: NAD, well-developed  HEAD:  Atraumatic, Normocephalic  EYES: EOMI, PERRLA, conjunctiva and sclera clear  NECK: Supple, No JVD  CHEST/LUNG: Clear to auscultation bilaterally; No wheeze  HEART: Regular rate and rhythm; No murmurs, rubs, or gallops  ABDOMEN: Soft, Nontender, Nondistended; Bowel sounds present  EXTREMITIES:  2+ Peripheral Pulses, No clubbing, cyanosis, or edema  PSYCH: AAOx3  NEUROLOGY: non-focal  SKIN: No rashes or lesions

## 2022-05-10 NOTE — DISCHARGE NOTE PROVIDER - NSDCCPCAREPLAN_GEN_ALL_CORE_FT
PRINCIPAL DISCHARGE DIAGNOSIS  Diagnosis: Coronary artery disease with angina pectoris  Assessment and Plan of Treatment: [facility] cardiac work up negative. Outpatient cardio follow up on discharge.      SECONDARY DISCHARGE DIAGNOSES  Diagnosis: Lupus  Assessment and Plan of Treatment: Resume home meds    Diagnosis: Bipolar disorder  Assessment and Plan of Treatment: Resume home meds     PRINCIPAL DISCHARGE DIAGNOSIS  Diagnosis: Paresthesias  Assessment and Plan of Treatment: Please follow up with neurology outpatient      SECONDARY DISCHARGE DIAGNOSES  Diagnosis: Lupus  Assessment and Plan of Treatment: Resume home meds    Diagnosis: Heart palpitations  Assessment and Plan of Treatment: Seen by cardiology. No further inpatient work up is necessary    Diagnosis: Bipolar disorder  Assessment and Plan of Treatment: Resume home meds

## 2022-05-10 NOTE — DISCHARGE NOTE PROVIDER - PROVIDER TOKENS
PROVIDER:[TOKEN:[[id number]],FOLLOWUP:[2 weeks]] PROVIDER:[TOKEN:[[id number]],FOLLOWUP:[2 weeks]],PROVIDER:[TOKEN:[[id number]],FOLLOWUP:[1 week]]

## 2022-05-10 NOTE — H&P ADULT - ASSESSMENT
Patient is a 45 yo female with PMH of Asthma, Bipolar, lupus, h/o MI 2021 s/p PCI to lad, LV apical thrombus no longer on AC presented to the ED for rt arm pain, numbness and palpitation for 4 days on /off. denies chest pain of SOB. not associated with exertion, symptoms does not occur at sleep but she states that she has severe insomnia. denies any falls or trauma.  in the ED, ekg unchanged from prior, initial trops negative. admitted for telemonitoring and cardiac work up.     Patient is a 47 yo female with PMH of Asthma, Bipolar, lupus, h/o MI 2021 s/p PCI to lad, LV apical thrombus no longer on AC presented to the ED for rt arm pain, numbness and palpitation for 4 days on /off. denies chest pain of SOB. not associated with exertion, symptoms does not occur at sleep but she states that she has severe insomnia. denies any falls or trauma.  in the ED, ekg unchanged from prior, initial trops negative. admitted for telemonitoring and cardiac work up.    Impression:    # Rt arm numbness and palpitation:  rule out cardiac etiology high risk patient , non compliant,  h/o MI, LV mural thrombus currently not on AC  patient with history of bipolar disorder and severe anxiety and insomnia  trend troponin  initial ekg NSR  keep telemonitoring  trend troponin initial trop negative  currently no palpitation, some numbness still present  ct head negative  pending c spine to look for radiculopathy.    #Lupus  -cont outpt   -outpt rheum f/u     Bipolar disorder   -denies SI  -cont home medication    h/o Polysubstance abuse   -f/u utox       dvt ppx: lovenox    keep npo for now       Patient is a 47 yo female with PMH of Asthma, Bipolar, lupus, h/o MI 2021 s/p PCI to lad, LV apical thrombus no longer on AC presented to the ED for rt arm pain, numbness and palpitation for 4 days on /off. denies chest pain or SOB. not associated with exertion, symptoms does not occur at sleep but she states that she has severe insomnia. denies any falls or trauma.  in the ED, ekg unchanged from prior, initial trops negative. admitted for telemonitoring and cardiac work up.    Impression:    # Rt arm numbness and palpitation:  rule out cardiac etiology high risk patient , non compliant,  h/o MI, LV mural thrombus currently not on AC  patient with history of bipolar disorder and severe anxiety and insomnia  trend troponin  initial ekg NSR  keep telemonitoring  trend troponin initial trop negative  currently no palpitation, some numbness still present  ct head negative  pending c spine to look for radiculopathy.    #Lupus  -cont outpt   -outpt rheum f/u     Bipolar disorder   -denies SI  -cont home medication    h/o Polysubstance abuse   -f/u utox       dvt ppx: lovenox    keep npo for now

## 2022-05-10 NOTE — ED ADULT NURSE REASSESSMENT NOTE - NS ED NURSE REASSESS COMMENT FT1
Pt resting comfortably, mentioned that her right sided tingling is not as bad as it had been, AOx4, speaking coherently, respirations even and unlabored on RA, skin warm and dry, LR bolus infusing, CM and  in place, bed in the lowest position side rails up.
Assumed care from previous RN at 0210, POC reviewed. Pt resting comfortably, respirations even and unlabored on RA, no signs of distress. CM and  in place, bed in the lowest position, side rails up.
received report for pt. pt is resting comfortable on stretcher, resp even unlabred. pt awakes to verbal stimuli. denies pain.

## 2022-05-10 NOTE — ED CDU PROVIDER DISPOSITION NOTE - ATTENDING CONTRIBUTION TO CARE
47yoF p/w right UE paresthesias and papitations. ED workup unremarkable, troponin negative. initially pt given to obs but attending admitted patient to hospitalist service for inpt management. Pt discharged by inpatient team.

## 2022-05-10 NOTE — ED CDU PROVIDER INITIAL DAY NOTE - NS ED ROS FT
ROS: CONTUSIONAL: Denies fever, chills, fatigue, wt loss. HEAD: Denies trauma, HA, Dizziness. EYE: Denies Acute visual changes, diplopia. ENMT: Denies change in hearing, tinnitus, epistaxis, difficulty swallowing, sore throat. CARDIO: CP,  Denies palpitations, edema. RESP: Denies Cough, SOB , Diff breathing, hemoptysis. GI: Denies N/V, ABD pain, change in bowel movement. URINARY: Denies difficulty urinating, pelvic pain. MS:  Denies joint pain, back pain, weakness, decreased ROM, swelling. NEURO: pins and needle sensation to Rt arm Denies change in gait, seizures, loss of sensation, dizziness, confusion LOC.  PSY: NO SI/HI. SKIN: Denies Rash, bruising.

## 2022-05-10 NOTE — ED CDU PROVIDER DISPOSITION NOTE - PATIENT PORTAL LINK FT
You can access the FollowMyHealth Patient Portal offered by [facility] by registering at the following website: [email or web address]. By joining [employer]’s FollowMyHealth portal, you will also be able to view your health information using other applications (apps) compatible with our system.

## 2022-05-10 NOTE — ED CDU PROVIDER INITIAL DAY NOTE - MEDICAL DECISION MAKING DETAILS
PT with  paresthesia. placed in obs for Diagnostic uncertainty. PT seen BY OBS PA found to be appropriate CDU PT care transferred to PA.

## 2022-05-10 NOTE — DISCHARGE NOTE PROVIDER - CARE PROVIDERS DIRECT ADDRESSES
[email or web address]

## 2022-05-10 NOTE — ED CDU PROVIDER DISPOSITION NOTE - CLINICAL COURSE
46yo F presented c/o right arm paresthesias and chest fluttering sensation. ED workup unremarkable, troponin negative. initially pt given to obs but attending admitted patient to hospitalist service for inpt management. 46yo F presented c/o right arm paresthesias and chest fluttering sensation. ED workup unremarkable, troponin negative. initially pt given to obs but attending admitted patient to hospitalist service for inpt management. Pt discharged by inpatient team

## 2022-05-10 NOTE — ED CDU PROVIDER INITIAL DAY NOTE - NS ED ATTENDING STATEMENT MOD
This was a shared visit with the MARIELA. I reviewed and verified the documentation and independently performed the documented:

## 2022-05-10 NOTE — DISCHARGE NOTE PROVIDER - NSDCMRMEDTOKEN_GEN_ALL_CORE_FT
ALBUTEROL SULFATE HFA  INH: 2 puff(s) inhaled every 4 hours, As Needed shortness of breath  ASPIRIN LOW DOSE 81MG EC TAB: 1 tab(s) orally once a day  ATORVASTATIN CALCIUM 40MG TAB: 1 tab(s) orally once a day  CLOPIDOGREL 75MG TAB: 1 tab(s) orally once a day  FERROUS SULFATE 325MG TAB: 1 tab(s) orally once a day  METOPROLOL TARTRATE 25MG TAB: 1 tab(s) orally 2 times a day  PANTOPRAZOLE SODIUM 40MG DR TAB: 1 tab(s) orally once a day  VITAMIN D 50,000IU CAP:   VRAYLAR 3MG CAP:    ALBUTEROL SULFATE HFA  INH: 2 puff(s) inhaled every 4 hours, As Needed shortness of breath  ASPIRIN LOW DOSE 81MG EC TAB: 1 tab(s) orally once a day  ATORVASTATIN CALCIUM 40MG TAB: 1 tab(s) orally once a day  CLOPIDOGREL 75MG TAB: 1 tab(s) orally once a day  FERROUS SULFATE 325MG TAB: 1 tab(s) orally once a day  METOPROLOL TARTRATE 25MG TAB: 1 tab(s) orally 2 times a day  PANTOPRAZOLE SODIUM 40MG DR TAB: 1 tab(s) orally once a day  VITAMIN D 50,000IU CAP: 1 cap(s) orally once a day  VRAYLAR 3MG CAP: 1 cap(s) orally once a day

## 2022-05-10 NOTE — ED CDU PROVIDER INITIAL DAY NOTE - ATTENDING APP SHARED VISIT CONTRIBUTION OF CARE
The patient seen and examined    Paresthesia    I, Jordi Whitehead, performed the initial face to face bedside interview with this patient regarding history of present illness, review of symptoms and relevant past medical, social and family history.  I completed an independent physical examination.  I was the initial provider who evaluated this patient. I have signed out the follow up of any pending tests (i.e. labs, radiological studies) to the ACP.  I have communicated the patient’s plan of care and disposition with the ACP.

## 2022-05-10 NOTE — ED CDU PROVIDER INITIAL DAY NOTE - DETAILS
Pt with complicated card hx placed in obs for evaluation of CP that has since resolved prior to placement in obs,  and further eval of Rt hand paresthesia.

## 2022-05-30 PROCEDURE — 71045 X-RAY EXAM CHEST 1 VIEW: CPT

## 2022-05-30 PROCEDURE — 84702 CHORIONIC GONADOTROPIN TEST: CPT

## 2022-05-30 PROCEDURE — 85610 PROTHROMBIN TIME: CPT

## 2022-05-30 PROCEDURE — 84443 ASSAY THYROID STIM HORMONE: CPT

## 2022-05-30 PROCEDURE — 80053 COMPREHEN METABOLIC PANEL: CPT

## 2022-05-30 PROCEDURE — 84436 ASSAY OF TOTAL THYROXINE: CPT

## 2022-05-30 PROCEDURE — 36415 COLL VENOUS BLD VENIPUNCTURE: CPT

## 2022-05-30 PROCEDURE — 84295 ASSAY OF SERUM SODIUM: CPT

## 2022-05-30 PROCEDURE — 84484 ASSAY OF TROPONIN QUANT: CPT

## 2022-05-30 PROCEDURE — 83605 ASSAY OF LACTIC ACID: CPT

## 2022-05-30 PROCEDURE — 72125 CT NECK SPINE W/O DYE: CPT

## 2022-05-30 PROCEDURE — 83036 HEMOGLOBIN GLYCOSYLATED A1C: CPT

## 2022-05-30 PROCEDURE — 82435 ASSAY OF BLOOD CHLORIDE: CPT

## 2022-05-30 PROCEDURE — 82330 ASSAY OF CALCIUM: CPT

## 2022-05-30 PROCEDURE — 82803 BLOOD GASES ANY COMBINATION: CPT

## 2022-05-30 PROCEDURE — 82947 ASSAY GLUCOSE BLOOD QUANT: CPT

## 2022-05-30 PROCEDURE — 85025 COMPLETE CBC W/AUTO DIFF WBC: CPT

## 2022-05-30 PROCEDURE — 70450 CT HEAD/BRAIN W/O DYE: CPT | Mod: MA

## 2022-05-30 PROCEDURE — 0225U NFCT DS DNA&RNA 21 SARSCOV2: CPT

## 2022-05-30 PROCEDURE — 84132 ASSAY OF SERUM POTASSIUM: CPT

## 2022-05-30 PROCEDURE — 99285 EMERGENCY DEPT VISIT HI MDM: CPT | Mod: 25

## 2022-05-30 PROCEDURE — 85018 HEMOGLOBIN: CPT

## 2022-05-30 PROCEDURE — 80061 LIPID PANEL: CPT

## 2022-05-30 PROCEDURE — 84480 ASSAY TRIIODOTHYRONINE (T3): CPT

## 2022-05-30 PROCEDURE — 85014 HEMATOCRIT: CPT

## 2022-05-30 PROCEDURE — 93005 ELECTROCARDIOGRAM TRACING: CPT

## 2022-06-02 ENCOUNTER — APPOINTMENT (OUTPATIENT)
Dept: OBGYN | Facility: CLINIC | Age: 47
End: 2022-06-02

## 2022-06-20 ENCOUNTER — EMERGENCY (EMERGENCY)
Facility: HOSPITAL | Age: 47
LOS: 1 days | Discharge: HOME | End: 2022-06-20
Attending: EMERGENCY MEDICINE
Payer: MEDICAID

## 2022-06-20 VITALS
RESPIRATION RATE: 16 BRPM | OXYGEN SATURATION: 99 % | WEIGHT: 175.93 LBS | HEART RATE: 91 BPM | HEIGHT: 63 IN | TEMPERATURE: 98 F | SYSTOLIC BLOOD PRESSURE: 126 MMHG | DIASTOLIC BLOOD PRESSURE: 86 MMHG

## 2022-06-20 DIAGNOSIS — Z90.721 ACQUIRED ABSENCE OF OVARIES, UNILATERAL: Chronic | ICD-10-CM

## 2022-06-20 DIAGNOSIS — Z90.89 ACQUIRED ABSENCE OF OTHER ORGANS: Chronic | ICD-10-CM

## 2022-06-20 DIAGNOSIS — Z30.8 ENCOUNTER FOR OTHER CONTRACEPTIVE MANAGEMENT: Chronic | ICD-10-CM

## 2022-06-20 PROCEDURE — 99284 EMERGENCY DEPT VISIT MOD MDM: CPT | Mod: 25

## 2022-06-20 PROCEDURE — 96372 THER/PROPH/DIAG INJ SC/IM: CPT

## 2022-06-20 PROCEDURE — 72125 CT NECK SPINE W/O DYE: CPT | Mod: 26,MA

## 2022-06-20 PROCEDURE — 99284 EMERGENCY DEPT VISIT MOD MDM: CPT

## 2022-06-20 PROCEDURE — 72125 CT NECK SPINE W/O DYE: CPT | Mod: MA

## 2022-06-20 RX ORDER — KETOROLAC TROMETHAMINE 30 MG/ML
30 SYRINGE (ML) INJECTION ONCE
Refills: 0 | Status: DISCONTINUED | OUTPATIENT
Start: 2022-06-20 | End: 2022-06-20

## 2022-06-20 RX ORDER — METHOCARBAMOL 500 MG/1
1 TABLET, FILM COATED ORAL
Qty: 14 | Refills: 0
Start: 2022-06-20 | End: 2022-06-26

## 2022-06-20 RX ORDER — LIDOCAINE 4 G/100G
1 CREAM TOPICAL ONCE
Refills: 0 | Status: COMPLETED | OUTPATIENT
Start: 2022-06-20 | End: 2022-06-20

## 2022-06-20 RX ORDER — METHOCARBAMOL 500 MG/1
750 TABLET, FILM COATED ORAL ONCE
Refills: 0 | Status: COMPLETED | OUTPATIENT
Start: 2022-06-20 | End: 2022-06-20

## 2022-06-20 RX ADMIN — Medication 30 MILLIGRAM(S): at 08:44

## 2022-06-20 RX ADMIN — METHOCARBAMOL 750 MILLIGRAM(S): 500 TABLET, FILM COATED ORAL at 08:44

## 2022-06-20 RX ADMIN — LIDOCAINE 1 PATCH: 4 CREAM TOPICAL at 08:44

## 2022-06-20 NOTE — ED ADULT NURSE NOTE - NSIMPLEMENTINTERV_GEN_ALL_ED
Implemented All Universal Safety Interventions:  [place] to call system. Call bell, personal items and telephone within reach. Instruct patient to call for assistance. Room bathroom lighting operational. Non-slip footwear when patient is off stretcher. Physically safe environment: no spills, clutter or unnecessary equipment. Stretcher in lowest position, wheels locked, appropriate side rails in place.

## 2022-06-20 NOTE — ED ADULT TRIAGE NOTE - CHIEF COMPLAINT QUOTE
Ambulatory reporting ongoing R shoulder pain that started at least 2 weeks ago, taking Tylenol and diclofenac without relief. Reports pain is worsening and she has decreased ROM. Denies injury/trauma to the area.

## 2022-06-20 NOTE — ED PROVIDER NOTE - OBJECTIVE STATEMENT
48 y/o female with PMHx of CAD, GERD, Asthma, Bipolar, Lupus c/o shoulder pain. Pt reports having right shoulder pain after hearing a cracking sound on Friday with right sided neck pain. Pt states having right arm tingling sensation and was evaluated here in May. Pt has a neuro f/u in July. Denies taking any pain meds PTA.

## 2022-06-20 NOTE — ED PROVIDER NOTE - ATTENDING CONTRIBUTION TO CARE
I, Guillermina Vinson, performed the initial face to face bedside interview with this patient regarding history of present illness, review of symptoms and relevant past medical, social and family history.  I completed an independent physical examination.  I was the initial provider who evaluated this patient. I have signed out the follow up of any pending tests (i.e. labs, radiological studies) to the resident.  I have communicated the patient’s plan of care and disposition with the resident  The history, relevant review of systems, past medical and surgical history, medical decision making, and physical examination was documented by the scribe in my presence and I attest to the accuracy of the documentation.

## 2022-06-20 NOTE — ED PROVIDER NOTE - NSFOLLOWUPINSTRUCTIONS_ED_ALL_ED_FT
Followup with your primary care doctor in next 1-10 days. Take robaxin every 12 hours if needed. do not take medication if operating heavy machinery or driving.     Return to emergency department if symptoms worsen, fever/chills, worsening pain, changes in sensation, vision changes, headache.

## 2022-06-20 NOTE — ED PROVIDER NOTE - PATIENT PORTAL LINK FT
You can access the FollowMyHealth Patient Portal offered by [facility] by registering at the following website: [email or web address]. By joining [employer]’s FollowMyHealth portal, you will also be able to view your health information using other applications (apps) compatible with our system.

## 2022-06-20 NOTE — ED PROVIDER NOTE - CLINICAL SUMMARY MEDICAL DECISION MAKING FREE TEXT BOX
Pt with MSK pain. Will treat with Toradol, Lidocaine patch, Robaxin and steroids, get CT neck and reassess

## 2022-06-27 ENCOUNTER — NON-APPOINTMENT (OUTPATIENT)
Age: 47
End: 2022-06-27

## 2022-06-30 ENCOUNTER — EMERGENCY (EMERGENCY)
Facility: HOSPITAL | Age: 47
LOS: 1 days | Discharge: HOME | End: 2022-06-30
Attending: EMERGENCY MEDICINE
Payer: MEDICAID

## 2022-06-30 VITALS
DIASTOLIC BLOOD PRESSURE: 59 MMHG | RESPIRATION RATE: 16 BRPM | WEIGHT: 175.05 LBS | TEMPERATURE: 98 F | HEART RATE: 75 BPM | SYSTOLIC BLOOD PRESSURE: 96 MMHG | OXYGEN SATURATION: 99 % | HEIGHT: 63 IN

## 2022-06-30 DIAGNOSIS — Z90.721 ACQUIRED ABSENCE OF OVARIES, UNILATERAL: Chronic | ICD-10-CM

## 2022-06-30 DIAGNOSIS — Z90.89 ACQUIRED ABSENCE OF OTHER ORGANS: Chronic | ICD-10-CM

## 2022-06-30 DIAGNOSIS — Z30.8 ENCOUNTER FOR OTHER CONTRACEPTIVE MANAGEMENT: Chronic | ICD-10-CM

## 2022-06-30 LAB
ALBUMIN SERPL ELPH-MCNC: 4.4 G/DL — SIGNIFICANT CHANGE UP (ref 3.3–5.2)
ALP SERPL-CCNC: 123 U/L — HIGH (ref 40–120)
ALT FLD-CCNC: 12 U/L — SIGNIFICANT CHANGE UP
ANION GAP SERPL CALC-SCNC: 12 MMOL/L — SIGNIFICANT CHANGE UP (ref 5–17)
AST SERPL-CCNC: 19 U/L — SIGNIFICANT CHANGE UP
BASOPHILS # BLD AUTO: 0.02 K/UL — SIGNIFICANT CHANGE UP (ref 0–0.2)
BASOPHILS NFR BLD AUTO: 0.3 % — SIGNIFICANT CHANGE UP (ref 0–2)
BILIRUB SERPL-MCNC: <0.2 MG/DL — LOW (ref 0.4–2)
BUN SERPL-MCNC: 8.6 MG/DL — SIGNIFICANT CHANGE UP (ref 8–20)
CALCIUM SERPL-MCNC: 9 MG/DL — SIGNIFICANT CHANGE UP (ref 8.6–10.2)
CHLORIDE SERPL-SCNC: 104 MMOL/L — SIGNIFICANT CHANGE UP (ref 98–107)
CO2 SERPL-SCNC: 23 MMOL/L — SIGNIFICANT CHANGE UP (ref 22–29)
CREAT SERPL-MCNC: 0.69 MG/DL — SIGNIFICANT CHANGE UP (ref 0.5–1.3)
EGFR: 106 ML/MIN/1.73M2 — SIGNIFICANT CHANGE UP
EOSINOPHIL # BLD AUTO: 0.08 K/UL — SIGNIFICANT CHANGE UP (ref 0–0.5)
EOSINOPHIL NFR BLD AUTO: 1.2 % — SIGNIFICANT CHANGE UP (ref 0–6)
GLUCOSE SERPL-MCNC: 81 MG/DL — SIGNIFICANT CHANGE UP (ref 70–99)
HCG SERPL-ACNC: <4 MIU/ML — SIGNIFICANT CHANGE UP
HCT VFR BLD CALC: 34.7 % — SIGNIFICANT CHANGE UP (ref 34.5–45)
HGB BLD-MCNC: 11.2 G/DL — LOW (ref 11.5–15.5)
HIV 1 & 2 AB SERPL IA.RAPID: SIGNIFICANT CHANGE UP
IMM GRANULOCYTES NFR BLD AUTO: 0.3 % — SIGNIFICANT CHANGE UP (ref 0–1.5)
LYMPHOCYTES # BLD AUTO: 2.78 K/UL — SIGNIFICANT CHANGE UP (ref 1–3.3)
LYMPHOCYTES # BLD AUTO: 41.4 % — SIGNIFICANT CHANGE UP (ref 13–44)
MCHC RBC-ENTMCNC: 27.5 PG — SIGNIFICANT CHANGE UP (ref 27–34)
MCHC RBC-ENTMCNC: 32.3 GM/DL — SIGNIFICANT CHANGE UP (ref 32–36)
MCV RBC AUTO: 85.3 FL — SIGNIFICANT CHANGE UP (ref 80–100)
MONOCYTES # BLD AUTO: 0.62 K/UL — SIGNIFICANT CHANGE UP (ref 0–0.9)
MONOCYTES NFR BLD AUTO: 9.2 % — SIGNIFICANT CHANGE UP (ref 2–14)
NEUTROPHILS # BLD AUTO: 3.2 K/UL — SIGNIFICANT CHANGE UP (ref 1.8–7.4)
NEUTROPHILS NFR BLD AUTO: 47.6 % — SIGNIFICANT CHANGE UP (ref 43–77)
PLATELET # BLD AUTO: 330 K/UL — SIGNIFICANT CHANGE UP (ref 150–400)
POTASSIUM SERPL-MCNC: 4.4 MMOL/L — SIGNIFICANT CHANGE UP (ref 3.5–5.3)
POTASSIUM SERPL-SCNC: 4.4 MMOL/L — SIGNIFICANT CHANGE UP (ref 3.5–5.3)
PROT SERPL-MCNC: 7.3 G/DL — SIGNIFICANT CHANGE UP (ref 6.6–8.7)
RBC # BLD: 4.07 M/UL — SIGNIFICANT CHANGE UP (ref 3.8–5.2)
RBC # FLD: 17.2 % — HIGH (ref 10.3–14.5)
SODIUM SERPL-SCNC: 139 MMOL/L — SIGNIFICANT CHANGE UP (ref 135–145)
TROPONIN T SERPL-MCNC: <0.01 NG/ML — SIGNIFICANT CHANGE UP (ref 0–0.06)
WBC # BLD: 6.72 K/UL — SIGNIFICANT CHANGE UP (ref 3.8–10.5)
WBC # FLD AUTO: 6.72 K/UL — SIGNIFICANT CHANGE UP (ref 3.8–10.5)

## 2022-06-30 PROCEDURE — 80053 COMPREHEN METABOLIC PANEL: CPT

## 2022-06-30 PROCEDURE — 93010 ELECTROCARDIOGRAM REPORT: CPT

## 2022-06-30 PROCEDURE — 71045 X-RAY EXAM CHEST 1 VIEW: CPT | Mod: 26

## 2022-06-30 PROCEDURE — 93005 ELECTROCARDIOGRAM TRACING: CPT

## 2022-06-30 PROCEDURE — 99285 EMERGENCY DEPT VISIT HI MDM: CPT

## 2022-06-30 PROCEDURE — 86703 HIV-1/HIV-2 1 RESULT ANTBDY: CPT

## 2022-06-30 PROCEDURE — 84702 CHORIONIC GONADOTROPIN TEST: CPT

## 2022-06-30 PROCEDURE — 71045 X-RAY EXAM CHEST 1 VIEW: CPT

## 2022-06-30 PROCEDURE — 85025 COMPLETE CBC W/AUTO DIFF WBC: CPT

## 2022-06-30 PROCEDURE — 99285 EMERGENCY DEPT VISIT HI MDM: CPT | Mod: 25

## 2022-06-30 PROCEDURE — 84484 ASSAY OF TROPONIN QUANT: CPT

## 2022-06-30 PROCEDURE — 36415 COLL VENOUS BLD VENIPUNCTURE: CPT

## 2022-06-30 NOTE — ED PROVIDER NOTE - PHYSICAL EXAMINATION
Gen: No acute distress, non toxic  HEENT: Mucous membranes moist, pink conjunctivae, EOMI  CV: RRR, nl s1/s2.  Resp: CTAB, normal rate and effort  GI: Abdomen soft, NT, ND. No rebound, no guarding  : No CVAT  Neuro: A&O x 3, moving all 4 extremities  MSK: No spine or joint tenderness to palpation  Skin: No rashes. intact and perfused

## 2022-06-30 NOTE — ED ADULT TRIAGE NOTE - CHIEF COMPLAINT QUOTE
Pt had sudden onset of substernal chest pain with SOB that resolved prior to coming to the hospital. Had am MI in Sept with 1 stent placed

## 2022-06-30 NOTE — ED ADULT NURSE NOTE - OBJECTIVE STATEMENT
Assumed care of patient in a14L. Pt a&ox4 rr even and unlabored presents to ed with pmhx of mi with a stent in september. Pt reports chest pain started at 3pm and it went on "for a few minutes". Pt's chest pain with sob resolved prior to arriving at the hospital. Pt denies fevers, chills, nvd, changes in vision, gu/gi symptoms, chest pain. pt educated on plan of care, pt able to successfully teach back plan of care to RN, RN will continue to reeducate pt during hospital stay.

## 2022-06-30 NOTE — ED PROVIDER NOTE - PROGRESS NOTE DETAILS
Eduardo: pt feels well, no acute findings. recommended second trop, pt has  to go to and would like to leave, understands w/u not complete and states will come back if anythign bothers her. return precautions. will f/u cards.

## 2022-07-01 ENCOUNTER — NON-APPOINTMENT (OUTPATIENT)
Age: 47
End: 2022-07-01

## 2022-07-02 NOTE — ED ADULT NURSE NOTE - PRO INTERPRETER NEED 2
[address]   Progress Note  Cardiology      Shaylee Ziegler   Admission date:  6/28/2022  CC-f/up a fib  Subjective:  She feels lousy,denies dyspnea    Objective:  Medications/Labs all Reviewed     dilTIAZem  120 mg Oral Daily    heparin (porcine)  5,000 Units SubCUTAneous 3 times per day    piperacillin-tazobactam  3,375 mg IntraVENous Q12H    sodium chloride flush  5-40 mL IntraVENous 2 times per day       BMP:   Lab Results   Component Value Date/Time     07/02/2022 04:35 AM    K 3.1 07/02/2022 04:35 AM    K 3.8 06/29/2022 05:19 AM     07/02/2022 04:35 AM    CO2 24 07/02/2022 04:35 AM    BUN 54 07/02/2022 04:35 AM    CREATININE 3.1 07/02/2022 04:35 AM    MG 2.00 07/01/2022 06:50 AM     CBC:    Lab Results   Component Value Date/Time    WBC 14.2 07/02/2022 04:35 AM    RBC 3.61 07/02/2022 04:35 AM    RBC 4.41 08/02/2016 10:34 AM    HGB 10.4 07/02/2022 04:35 AM    HCT 31.2 07/02/2022 04:35 AM    MCV 86.5 07/02/2022 04:35 AM    RDW 15.0 07/02/2022 04:35 AM     07/02/2022 04:35 AM      PT/INR:    Lab Results   Component Value Date    INR 1.57 (H) 06/29/2022    PROTIME 18.6 (H) 06/29/2022     Cardiac Enzymes:  No results found for: CKTOTAL, CKMB, CKMBINDEX  Lab Results   Component Value Date    TROPONINI <0.01 06/30/2022    TROPONINI <0.01 06/30/2022    TROPONINI <0.01 06/28/2022     BNP:  No results found for: BNP  FASTING LIPID PANEL:    Lab Results   Component Value Date/Time    HDL 39 10/25/2018 11:39 AM       Physical Examination:    BP (!) 187/83   Pulse 71   Temp 98.5 °F (36.9 °C) (Oral)   Resp 16   Ht 5' 4\" (1.626 m)   Wt 158 lb (71.7 kg)   SpO2 95%   BMI 27.12 kg/m²      Respiratory:NG in place  · Resp Assessment: Normal respiratory effort  · Resp Auscultation: Clear to auscultation bilaterally   Cardiovascular:  · Auscultation: regular rate and rhythm, normal S1S2, no murmur, rub or gallop  · Palpation:  Nl PMI  · JVP:  normal  · Extremities: No Edema  Abdomen:  · Nontender,no bowel sounds  Extremities:  ·  No Cyanosis or Clubbing  Neurological/Psychiatric:  · Oriented to time, place, and person  · Non-anxious  Skin Warm and dry    Assessment:    Principal Problem:    Gastric perforation (HCC)    Active Problems:    Perforated peptic ulcer ([facility] [address]      Atrial fibrillation (HCC)  Plan:currently sinus    Acute renal failure      Plan:  1.  continue cardizem    Echo findings reviewed with patient  I have spent  35  minutes of face to face time with the patient with more than 50%  spent  counseling and coordinating care for [place]

## 2022-07-14 ENCOUNTER — APPOINTMENT (OUTPATIENT)
Dept: ORTHOPEDIC SURGERY | Facility: CLINIC | Age: 47
End: 2022-07-14

## 2022-07-14 VITALS
DIASTOLIC BLOOD PRESSURE: 73 MMHG | HEART RATE: 78 BPM | HEIGHT: 63 IN | WEIGHT: 178 LBS | SYSTOLIC BLOOD PRESSURE: 102 MMHG | BODY MASS INDEX: 31.54 KG/M2

## 2022-07-14 DIAGNOSIS — M25.511 PAIN IN RIGHT SHOULDER: ICD-10-CM

## 2022-07-14 PROCEDURE — 99213 OFFICE O/P EST LOW 20 MIN: CPT

## 2022-07-14 PROCEDURE — 73030 X-RAY EXAM OF SHOULDER: CPT | Mod: RT

## 2022-07-14 NOTE — DISCUSSION/SUMMARY
[de-identified] : Assessment: 47-year-old female with right shoulder impingement syndrome, cervical radiculopathy.\par \par Plan: I had a long discussion with the patient today regarding the nature of their diagnosis and treatment plan. We discussed the risks and benefits of no treatment as well as nonoperative and operative treatments.  I reviewed the x-ray results of the shoulder that were negative for any acute pathology.  On examination today the majority of symptoms seem to be coming from her neck and she has minimal symptoms in regards to her shoulder.  She also does have some rotator cuff impingement signs but has good strength in all planes at this time I recommend conservative treatment of the patient's condition with modalities including rest, ice, heat, anti-inflammatory medications, activity modifications, and home stretching and strengthening exercises daily.  A prescription for Medrol Dosepak was sent to the pharmacy today.  She cannot take NSAIDs because she is on blood thinners.  I reviewed the risk benefit associated with oral steroid use.  She was provided with referral to see physiatry as well regarding her neck pain.  Referral for physical therapy was provided for both the neck and shoulder to begin working exercises helping for her pain and function.  Recommend follow-up in 8 weeks for repeat evaluation.  If she continues to have pain in the shoulder we may consider cortisone injection versus further imaging of the shoulder.  Patient seen and examined with Dr. Watson today.\par  \par The patient verbalizes their understanding and agrees with the plan.  All questions were answered to their satisfaction.

## 2022-07-14 NOTE — HISTORY OF PRESENT ILLNESS
[Pain Location] : pain [] : right shoulder [Worsening] : worsening [8] : a current pain level of 8/10 [9] : a minimum pain level of 9/10 [10] : a maximum pain level of 10/10 [Constant] : ~He/She~ states the symptoms seem to be constant [Direct Pressure] : worsened by direct pressure [Lifting] : worsened by lifting [Rest] : relieved by rest [de-identified] : 07/14/2022 : YOVANI RUSHING  is a 47 year year old female who presents to the office for evaluation of her right shoulder and neck pain today.  She states her neck is killing her and she has pain to her shoulder.  She states it radiates down the arm and is worse certain activities and movements and alleviated with rest.  She states it affects her sleep.  She states the pain can be very severe at times.  She  has taken muscle relaxers which gives little to no relief.  She is here for specialist opinion today.  She states she gets intermittent numbness and tingling into the fingers.  She denies any numbness or tingling currently.  She has no other complaints today.  She denies any acute injury.

## 2022-07-14 NOTE — REVIEW OF SYSTEMS
[Arthralgia] : arthralgia [Joint Pain] : joint pain [Negative] : Heme/Lymph [FreeTextEntry9] : right shoulder/Neck pain

## 2022-07-14 NOTE — PHYSICAL EXAM
[de-identified] : General:\par Awake, alert, no acute distress, Patient was cooperative and appropriate during the examination.\par \par The patient is of normal weight for height and age.\par \par Walks without an antalgic gait.\par \par Full, painless range of motion of the neck and back.\par \par Exam of the bilateral lower extremities is intact and symmetric with regards to dermatologic, vascular, and neurologic exam. Bilateral lower extremity sensation is grossly intact to light touch in the DP/SP/T/S/S nerve distributions. Intact DF/PF/EHL. BIlateral lower extremities warm and well-perfused with brisk capillary refill.\par \par \par Pulmonary:\par Regular, nonlabored breathing\par \par Abdomen:\par Soft, nontender, nondistended.\par \par Lymphatic:\par No evidence of axillary lymphadenopathy\par \par Right shoulder/Cervical Spine Exam:\par Physical exam of the shoulder demonstrates normal skin without signs of skin changes or abnormalities. No erythema, warmth, or joint effusion appreciated.  \par  \par Sensation intact light touch C5-T1\par Palpable radial pulse\par 2+ reflexes in the bilateral upper extremities.\par Fingers are freely mobile in all planes.\par Radial/ulnar/median/axillary/musculocutaneous/AIN/PIN nerves grossly intact\par  \par Shoulder Range of motion:\par Forward Flexion: 180\par Abduction: 150\par External Rotation: 45\par Internal Rotation: Upper lumbar level\par \par Cervical Range of motion:\par Forward Flexion: 30\par Right lateral bending: 10\par Left lateral bendin\par Right sided rotation: 60\par Left sided rotation: 70\par Extension: 45\par  \par Palpation:\par Not tender to palpation over the glenohumeral joint\par Mildly tender palpation over the rotator cuff insertion on the greater tuberosity\par Not tender to palpation over the AC joint\par Mildly tender to palpation of the biceps tendon/bicipital groove\par Moderately tender to palpation over the trapezial and paracervical/periscapular musculature\par No cervical midline tenderness\par  \par Strength testing:\par Supraspinatus: 5/5\par Infraspinatus: 5/5\par Subscapularis: 5/5\par  \par Special test:\par Spurling's test mildly positive\par Empty can test positive\par Colin impingement test positive\par Speeds test: Negative\par Ouray's test negative\par Lift-off test negative\par Bell-press test negative\par Cross-arm adduction test negative\par   [de-identified] : X-ray 4 views of the right shoulder taken in the office today on 7/14/2022 shows no acute fracture or dislocation.  No significant arthritis.

## 2022-07-18 ENCOUNTER — NON-APPOINTMENT (OUTPATIENT)
Age: 47
End: 2022-07-18

## 2022-07-25 ENCOUNTER — APPOINTMENT (OUTPATIENT)
Dept: NEUROLOGY | Facility: CLINIC | Age: 47
End: 2022-07-25

## 2022-08-02 ENCOUNTER — NON-APPOINTMENT (OUTPATIENT)
Age: 47
End: 2022-08-02

## 2022-08-04 ENCOUNTER — APPOINTMENT (OUTPATIENT)
Dept: OBGYN | Facility: CLINIC | Age: 47
End: 2022-08-04

## 2022-08-04 VITALS
WEIGHT: 178 LBS | HEIGHT: 63 IN | BODY MASS INDEX: 31.54 KG/M2 | DIASTOLIC BLOOD PRESSURE: 60 MMHG | SYSTOLIC BLOOD PRESSURE: 90 MMHG

## 2022-08-04 DIAGNOSIS — N92.4 EXCESSIVE BLEEDING IN THE PREMENOPAUSAL PERIOD: ICD-10-CM

## 2022-08-04 DIAGNOSIS — N92.0 EXCESSIVE AND FREQUENT MENSTRUATION WITH REGULAR CYCLE: ICD-10-CM

## 2022-08-04 PROCEDURE — 99213 OFFICE O/P EST LOW 20 MIN: CPT

## 2022-08-04 RX ORDER — ALBUTEROL SULFATE 90 UG/1
108 (90 BASE) INHALANT RESPIRATORY (INHALATION)
Qty: 8 | Refills: 0 | Status: ACTIVE | COMMUNITY
Start: 2022-05-31

## 2022-08-04 RX ORDER — CLOTRIMAZOLE AND BETAMETHASONE DIPROPIONATE 10; .5 MG/G; MG/G
1-0.05 CREAM TOPICAL
Qty: 45 | Refills: 0 | Status: DISCONTINUED | COMMUNITY
Start: 2022-03-08

## 2022-08-04 NOTE — PHYSICAL EXAM
[Chaperone Present] : A chaperone was present in the examining room during all aspects of the physical examination [Labia Majora] : normal [Labia Minora] : normal [Moderate] : There was moderate vaginal bleeding [Normal] : normal [Uterine Adnexae] : normal

## 2022-08-04 NOTE — HISTORY OF PRESENT ILLNESS
[FreeTextEntry1] : 47 year old female presents due to heavy vaginal bleeding. Pt. states she has not menstruated in 3 months prior to now but states that her bleeding is extremely heavy where she will soak through a pad every 15 minutes. Veronika called our office yesterday where she explained her bleeding to the nurse and was advised to go to [facility], the patient did not go. \par \par Veronika has history of anemia. She denies feeling light headed, nauseous , heart palpitations and chest pain. She states her bleeding is better today. Denies pelvic pain and vaginal discharge.

## 2022-08-04 NOTE — PLAN
[FreeTextEntry1] : Heavy menses/Perimenopausal menorrhagia \par \par - CBC drawn today \par - US ordered \par \par F/U in 1 week to discuss results and further determine plan of care. Pt. advised if bleeding gets worse where she is soaking a pad an hour, expression of clots becomes light headed, nauseous, chest pain, heart palpitations I advised her to go to [facility]. Pt. verbalized understanding. \par \par All questions and concerns addressed during encounter. Pt. agreed to plan of care.

## 2022-08-05 LAB
BASOPHILS # BLD AUTO: 0.05 K/UL
BASOPHILS NFR BLD AUTO: 0.7 %
EOSINOPHIL # BLD AUTO: 0.07 K/UL
EOSINOPHIL NFR BLD AUTO: 0.9 %
HCT VFR BLD CALC: 35.8 %
HGB BLD-MCNC: 10.9 G/DL
IMM GRANULOCYTES NFR BLD AUTO: 0.1 %
LYMPHOCYTES # BLD AUTO: 2.87 K/UL
LYMPHOCYTES NFR BLD AUTO: 38.6 %
MAN DIFF?: NORMAL
MCHC RBC-ENTMCNC: 27.3 PG
MCHC RBC-ENTMCNC: 30.4 GM/DL
MCV RBC AUTO: 89.7 FL
MONOCYTES # BLD AUTO: 0.55 K/UL
MONOCYTES NFR BLD AUTO: 7.4 %
NEUTROPHILS # BLD AUTO: 3.89 K/UL
NEUTROPHILS NFR BLD AUTO: 52.3 %
PLATELET # BLD AUTO: 337 K/UL
RBC # BLD: 3.99 M/UL
RBC # FLD: 18.1 %
WBC # FLD AUTO: 7.44 K/UL

## 2022-08-12 ENCOUNTER — APPOINTMENT (OUTPATIENT)
Dept: OBGYN | Facility: CLINIC | Age: 47
End: 2022-08-12

## 2022-08-30 ENCOUNTER — TRANSCRIPTION ENCOUNTER (OUTPATIENT)
Age: 47
End: 2022-08-30

## 2022-09-08 ENCOUNTER — APPOINTMENT (OUTPATIENT)
Dept: ORTHOPEDIC SURGERY | Facility: CLINIC | Age: 47
End: 2022-09-08

## 2022-09-08 ENCOUNTER — EMERGENCY (EMERGENCY)
Facility: HOSPITAL | Age: 47
LOS: 1 days | Discharge: HOME | End: 2022-09-08
Attending: EMERGENCY MEDICINE
Payer: MEDICAID

## 2022-09-08 VITALS
HEART RATE: 91 BPM | SYSTOLIC BLOOD PRESSURE: 111 MMHG | DIASTOLIC BLOOD PRESSURE: 73 MMHG | HEIGHT: 63 IN | RESPIRATION RATE: 18 BRPM | OXYGEN SATURATION: 99 % | TEMPERATURE: 98 F | WEIGHT: 173.06 LBS

## 2022-09-08 DIAGNOSIS — Z90.89 ACQUIRED ABSENCE OF OTHER ORGANS: Chronic | ICD-10-CM

## 2022-09-08 DIAGNOSIS — Z30.8 ENCOUNTER FOR OTHER CONTRACEPTIVE MANAGEMENT: Chronic | ICD-10-CM

## 2022-09-08 DIAGNOSIS — Z90.721 ACQUIRED ABSENCE OF OVARIES, UNILATERAL: Chronic | ICD-10-CM

## 2022-09-08 LAB
ALBUMIN SERPL ELPH-MCNC: 4.2 G/DL — SIGNIFICANT CHANGE UP (ref 3.3–5.2)
ALP SERPL-CCNC: 109 U/L — SIGNIFICANT CHANGE UP (ref 40–120)
ALT FLD-CCNC: 11 U/L — SIGNIFICANT CHANGE UP
ANION GAP SERPL CALC-SCNC: 12 MMOL/L — SIGNIFICANT CHANGE UP (ref 5–17)
APTT BLD: 29.6 SEC — SIGNIFICANT CHANGE UP (ref 27.5–35.5)
AST SERPL-CCNC: 17 U/L — SIGNIFICANT CHANGE UP
BASOPHILS # BLD AUTO: 0.04 K/UL — SIGNIFICANT CHANGE UP (ref 0–0.2)
BASOPHILS NFR BLD AUTO: 0.5 % — SIGNIFICANT CHANGE UP (ref 0–2)
BILIRUB SERPL-MCNC: <0.2 MG/DL — LOW (ref 0.4–2)
BUN SERPL-MCNC: 9.3 MG/DL — SIGNIFICANT CHANGE UP (ref 8–20)
CALCIUM SERPL-MCNC: 9 MG/DL — SIGNIFICANT CHANGE UP (ref 8.4–10.5)
CHLORIDE SERPL-SCNC: 106 MMOL/L — SIGNIFICANT CHANGE UP (ref 98–107)
CO2 SERPL-SCNC: 23 MMOL/L — SIGNIFICANT CHANGE UP (ref 22–29)
CREAT SERPL-MCNC: 0.63 MG/DL — SIGNIFICANT CHANGE UP (ref 0.5–1.3)
EGFR: 109 ML/MIN/1.73M2 — SIGNIFICANT CHANGE UP
EOSINOPHIL # BLD AUTO: 0.1 K/UL — SIGNIFICANT CHANGE UP (ref 0–0.5)
EOSINOPHIL NFR BLD AUTO: 1.3 % — SIGNIFICANT CHANGE UP (ref 0–6)
GLUCOSE SERPL-MCNC: 87 MG/DL — SIGNIFICANT CHANGE UP (ref 70–99)
HCT VFR BLD CALC: 35.8 % — SIGNIFICANT CHANGE UP (ref 34.5–45)
HGB BLD-MCNC: 11.7 G/DL — SIGNIFICANT CHANGE UP (ref 11.5–15.5)
IMM GRANULOCYTES NFR BLD AUTO: 0.3 % — SIGNIFICANT CHANGE UP (ref 0–1.5)
INR BLD: 0.98 RATIO — SIGNIFICANT CHANGE UP (ref 0.88–1.16)
LYMPHOCYTES # BLD AUTO: 3.41 K/UL — HIGH (ref 1–3.3)
LYMPHOCYTES # BLD AUTO: 43.8 % — SIGNIFICANT CHANGE UP (ref 13–44)
MAGNESIUM SERPL-MCNC: 2 MG/DL — SIGNIFICANT CHANGE UP (ref 1.6–2.6)
MCHC RBC-ENTMCNC: 27.9 PG — SIGNIFICANT CHANGE UP (ref 27–34)
MCHC RBC-ENTMCNC: 32.7 GM/DL — SIGNIFICANT CHANGE UP (ref 32–36)
MCV RBC AUTO: 85.2 FL — SIGNIFICANT CHANGE UP (ref 80–100)
MONOCYTES # BLD AUTO: 0.72 K/UL — SIGNIFICANT CHANGE UP (ref 0–0.9)
MONOCYTES NFR BLD AUTO: 9.3 % — SIGNIFICANT CHANGE UP (ref 2–14)
NEUTROPHILS # BLD AUTO: 3.49 K/UL — SIGNIFICANT CHANGE UP (ref 1.8–7.4)
NEUTROPHILS NFR BLD AUTO: 44.8 % — SIGNIFICANT CHANGE UP (ref 43–77)
NT-PROBNP SERPL-SCNC: 260 PG/ML — SIGNIFICANT CHANGE UP (ref 0–300)
PLATELET # BLD AUTO: 385 K/UL — SIGNIFICANT CHANGE UP (ref 150–400)
POTASSIUM SERPL-MCNC: 4.4 MMOL/L — SIGNIFICANT CHANGE UP (ref 3.5–5.3)
POTASSIUM SERPL-SCNC: 4.4 MMOL/L — SIGNIFICANT CHANGE UP (ref 3.5–5.3)
PROT SERPL-MCNC: 7 G/DL — SIGNIFICANT CHANGE UP (ref 6.6–8.7)
PROTHROM AB SERPL-ACNC: 11.4 SEC — SIGNIFICANT CHANGE UP (ref 10.5–13.4)
RBC # BLD: 4.2 M/UL — SIGNIFICANT CHANGE UP (ref 3.8–5.2)
RBC # FLD: 17.7 % — HIGH (ref 10.3–14.5)
SODIUM SERPL-SCNC: 141 MMOL/L — SIGNIFICANT CHANGE UP (ref 135–145)
TROPONIN T SERPL-MCNC: <0.01 NG/ML — SIGNIFICANT CHANGE UP (ref 0–0.06)
WBC # BLD: 7.78 K/UL — SIGNIFICANT CHANGE UP (ref 3.8–10.5)
WBC # FLD AUTO: 7.78 K/UL — SIGNIFICANT CHANGE UP (ref 3.8–10.5)

## 2022-09-08 PROCEDURE — 85610 PROTHROMBIN TIME: CPT

## 2022-09-08 PROCEDURE — 73080 X-RAY EXAM OF ELBOW: CPT | Mod: 26,LT

## 2022-09-08 PROCEDURE — 71045 X-RAY EXAM CHEST 1 VIEW: CPT | Mod: 26

## 2022-09-08 PROCEDURE — 72125 CT NECK SPINE W/O DYE: CPT | Mod: MA

## 2022-09-08 PROCEDURE — 99285 EMERGENCY DEPT VISIT HI MDM: CPT

## 2022-09-08 PROCEDURE — 73080 X-RAY EXAM OF ELBOW: CPT

## 2022-09-08 PROCEDURE — 85730 THROMBOPLASTIN TIME PARTIAL: CPT

## 2022-09-08 PROCEDURE — 83880 ASSAY OF NATRIURETIC PEPTIDE: CPT

## 2022-09-08 PROCEDURE — 71045 X-RAY EXAM CHEST 1 VIEW: CPT

## 2022-09-08 PROCEDURE — 93010 ELECTROCARDIOGRAM REPORT: CPT

## 2022-09-08 PROCEDURE — 83735 ASSAY OF MAGNESIUM: CPT

## 2022-09-08 PROCEDURE — 80053 COMPREHEN METABOLIC PANEL: CPT

## 2022-09-08 PROCEDURE — 72125 CT NECK SPINE W/O DYE: CPT | Mod: 26,MA

## 2022-09-08 PROCEDURE — 36415 COLL VENOUS BLD VENIPUNCTURE: CPT

## 2022-09-08 PROCEDURE — 93005 ELECTROCARDIOGRAM TRACING: CPT

## 2022-09-08 PROCEDURE — 70450 CT HEAD/BRAIN W/O DYE: CPT | Mod: 26,MA

## 2022-09-08 PROCEDURE — 85025 COMPLETE CBC W/AUTO DIFF WBC: CPT

## 2022-09-08 PROCEDURE — 84484 ASSAY OF TROPONIN QUANT: CPT

## 2022-09-08 PROCEDURE — 70450 CT HEAD/BRAIN W/O DYE: CPT | Mod: MA

## 2022-09-08 NOTE — ED PROVIDER NOTE - CARE PLAN
1 Principal Discharge DX:	Closed head injury  Secondary Diagnosis:	Medication taken at higher dose than recommended

## 2022-09-08 NOTE — ED ADULT NURSE NOTE - OBJECTIVE STATEMENT
Pt in ED d/t syncope episode. Reports she takes multiple psych and heart medication. Thinks she took too much of her trazodone and is what caused her to pass out. Episode witnessed by friend who is at bedside. Reports pt did hit head. Denies CP, SOB, acute vision changes, numbness/tingling, unilateral/general weakness. Having mild pain in back of head where she hit. AOx4, GCS 15.

## 2022-09-08 NOTE — ED PROVIDER NOTE - ENMT, MLM
L occipital scalp hematoma w/ tenderness. No facial tenderness or palpable deformity, no raccoon eyes, no shane sign. MMM

## 2022-09-08 NOTE — ED PROVIDER NOTE - OBJECTIVE STATEMENT
48 y/o F pt w/ pmhx of bipolar, htn, hld, MI presenting for headache s/p syncope 2 days ago. Pt reports she took more than her prescribed trazodone dose two days ago because she has been manic for the past month and not sleeping. Pt went to the bathroom and was moving her bowels when she began to feel warm and tingling w/ lightheadedness; pt then remembers waking up on the floor. She vomited once after waking. She has headache since then as well as L elbow pain and neck pain. She denies vision changes, tinnitus, numbness, tingling, focal weakness, chest pain, SOB, leg swelling, or any other complaints.

## 2022-09-08 NOTE — ED PROVIDER NOTE - NSFOLLOWUPINSTRUCTIONS_ED_ALL_ED_FT
You are advised to please follow up with your primary care doctor within the next 24 hours and return to the Emergency Department for worsening symptoms or any other concerns.  Your doctor may call [phone] to follow up on the specific results of the tests performed today in the emergency department.    Closed Head Injury    A closed head injury is an injury to your head that may or may not involve a traumatic brain injury (TBI).  A CT scan of the head may not have been performed because they are usually normal after a concussion. Concussions are diagnosed and managed based on the history given and the symptoms experienced after the head injury. Most concussions do not cause serious problem and get better over several days.  Symptoms of TBI can be short or long lasting and include headache, dizziness, interference with memory or speech, fatigue, confusion, changes in sleep, mood changes, nausea, depression/anxiety, and dulling of senses. Make sure to obtain proper rest which includes getting plenty of sleep, avoiding excessive visual stimulation, and avoiding activities that may cause physical or mental stress. Avoid any situation where there is potential for another head injury, including sports.    SEEK IMMEDIATE MEDICAL CARE IF YOU HAVE ANY OF THE FOLLOWING SYMPTOMS: unusual drowsiness, vomiting, severe dizziness, seizures, lightheadedness, muscular weakness, different pupil sizes, visual changes, or clear or bloody discharge from your ears or nose.    Syncope    Syncope is when you temporarily lose consciousness, also called fainting or passing out. It is caused by a sudden decrease in blood flow to the brain. Even though most causes of syncope are not dangerous, syncope can possibly be a sign of a serious medical problem. Signs that you may be about to faint include feeling dizzy, lightheaded, nausea, visual changes, or cold/clammy skin. Do not drive, operate heavy machinery, or play sports until your health care provider says it is okay.    SEEK IMMEDIATE MEDICAL CARE IF YOU HAVE ANY OF THE FOLLOWING SYMPTOMS: severe headache, pain in your chest/abdomen/back, bleeding from your mouth or rectum, palpitations, shortness of breath, pain with breathing, seizure, confusion, or trouble walking.

## 2022-09-08 NOTE — ED ADULT NURSE NOTE - CHPI ED NUR SYMPTOMS NEG
no blurred vision/no change in level of consciousness/no confusion/no dizziness/no nausea/no seizure/no vomiting/no weakness

## 2022-09-08 NOTE — ED PROVIDER NOTE - CLINICAL SUMMARY MEDICAL DECISION MAKING FREE TEXT BOX
48 y/o F pt w/ pmhx of bipolar, htn, hld, MI presenting w/ headache and elbow pain s/p syncope. Will get CT head/neck, CXR, Xray L elbow, labs, EKG, reassess.

## 2022-09-08 NOTE — ED PROVIDER NOTE - MUSCULOSKELETAL, MLM
+C-spine tenderness midline. Tenderness to L thoracic paraspinal area. L elbow tenderness. Full ROM L elbow and L shoulder.

## 2022-09-08 NOTE — ED PROVIDER NOTE - PATIENT PORTAL LINK FT
You can access the FollowMyHealth Patient Portal offered by [facility] by registering at the following website: [email or web address]. By joining [facility]’s FollowMyHealth portal, you will also be able to view your health information using other applications (apps) compatible with our system.
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
## 2022-09-08 NOTE — ED ADULT TRIAGE NOTE - CHIEF COMPLAINT QUOTE
pt states she having a lot of stress and hasn't slept for days, synopsized and hit head  x2 days ago, vomited x1 after pt synopsized, pt c/o left elbow, left shoulder, right side head pain... pt on Plavix and asa , denies SI/HI

## 2022-09-15 ENCOUNTER — NON-APPOINTMENT (OUTPATIENT)
Age: 47
End: 2022-09-15

## 2022-09-15 ENCOUNTER — APPOINTMENT (OUTPATIENT)
Dept: CARDIOLOGY | Facility: CLINIC | Age: 47
End: 2022-09-15

## 2022-09-15 VITALS
WEIGHT: 172 LBS | TEMPERATURE: 97.9 F | OXYGEN SATURATION: 98 % | SYSTOLIC BLOOD PRESSURE: 106 MMHG | HEIGHT: 63 IN | HEART RATE: 103 BPM | BODY MASS INDEX: 30.48 KG/M2 | DIASTOLIC BLOOD PRESSURE: 68 MMHG

## 2022-09-15 DIAGNOSIS — R55 SYNCOPE AND COLLAPSE: ICD-10-CM

## 2022-09-15 PROCEDURE — 99214 OFFICE O/P EST MOD 30 MIN: CPT | Mod: 25

## 2022-09-15 PROCEDURE — 93000 ELECTROCARDIOGRAM COMPLETE: CPT

## 2022-09-15 RX ORDER — FLUCONAZOLE 150 MG/1
150 TABLET ORAL
Qty: 2 | Refills: 1 | Status: DISCONTINUED | COMMUNITY
Start: 2022-03-23 | End: 2022-09-15

## 2022-09-15 RX ORDER — METHYLPREDNISOLONE 4 MG/1
4 TABLET ORAL
Qty: 1 | Refills: 0 | Status: DISCONTINUED | COMMUNITY
Start: 2022-07-14 | End: 2022-09-15

## 2022-09-15 RX ORDER — ERGOCALCIFEROL 1.25 MG/1
1.25 MG CAPSULE, LIQUID FILLED ORAL
Qty: 4 | Refills: 0 | Status: DISCONTINUED | COMMUNITY
Start: 2022-05-31 | End: 2022-09-15

## 2022-09-15 RX ORDER — ALPRAZOLAM 0.5 MG/1
0.5 TABLET ORAL
Qty: 2 | Refills: 0 | Status: DISCONTINUED | COMMUNITY
Start: 2022-06-28 | End: 2022-09-15

## 2022-09-15 RX ORDER — CHLORHEXIDINE GLUCONATE 4 %
325 (65 FE) LIQUID (ML) TOPICAL
Qty: 30 | Refills: 0 | Status: DISCONTINUED | COMMUNITY
Start: 2022-03-30 | End: 2022-09-15

## 2022-09-15 RX ORDER — DICLOFENAC SODIUM 75 MG/1
75 TABLET, DELAYED RELEASE ORAL
Qty: 60 | Refills: 0 | Status: DISCONTINUED | COMMUNITY
Start: 2022-04-25 | End: 2022-09-15

## 2022-09-15 RX ORDER — METHOCARBAMOL 750 MG/1
750 TABLET, FILM COATED ORAL
Qty: 14 | Refills: 0 | Status: DISCONTINUED | COMMUNITY
Start: 2022-06-20 | End: 2022-09-15

## 2022-09-15 RX ORDER — METRONIDAZOLE 500 MG/1
500 TABLET ORAL
Qty: 4 | Refills: 1 | Status: DISCONTINUED | COMMUNITY
Start: 2022-03-18 | End: 2022-09-15

## 2022-09-15 NOTE — REVIEW OF SYSTEMS
[Weight Gain (___ Lbs)] : [unfilled] ~Ulb weight gain [Feeling Fatigued] : not feeling fatigued [Seeing Double (Diplopia)] : no diplopia [Discharge From Ears] : no discharge from the ears [Dyspnea on exertion] : not dyspnea during exertion [Leg Claudication] : no intermittent leg claudication [Palpitations] : no palpitations [Cough] : no cough [Wheezing] : no wheezing [Nausea] : no nausea [Rash] : no rash [Dizziness] : no dizziness [Convulsions] : no convulsions

## 2022-09-15 NOTE — ASSESSMENT
[FreeTextEntry1] : pt with syncope and collapse\par Probably related to trazadone which she has stopped taking\par No cp or sob.\par No leg edema\par EKG is unchanged.\par Holter for 3 days.\par Keep well hydrated. \par Hx of CAD and PCI to LAD, LVEF on prior TTE is WNL\par Cont with statin therapy\par Can DC asa, cont with plavix. \par patient was advised to see us in 6 months if stable and certainly earlier with any new symptoms.\par Patient was advised to contact the office or seek medical care for any new or concerning symptoms right away.  Patient verbalized understanding and is in agreement with the above plan.\par \par

## 2022-09-15 NOTE — PHYSICAL EXAM
[Well Developed] : well developed [Well Nourished] : well nourished [Normal Conjunctiva] : normal conjunctiva [Normal Venous Pressure] : normal venous pressure [No Carotid Bruit] : no carotid bruit [Normal] : no edema, no cyanosis, no clubbing, no varicosities

## 2022-09-15 NOTE — HISTORY OF PRESENT ILLNESS
[FreeTextEntry1] : pt is seen today with an episode of syncope, CAD, HLD and prior MI\par \par last Tuesday did not sleep well, she took more trazadone than usual.  She felt tired and went to go to bed.\par She fell and passed out. She also vomited and was nauseous. \par 2 days later she went to [facility], she had ct scan of head. \par she has a lot of stress in family. \par \par history of bipolar disorder, Covid 19 infection - 9/17/2021, went to [facility] for CP and diagnosed of MI s/p PCI to LAD, also found with LV apical thrombus - given heparin. She signed out from [facility] on 9/25/2021 and went to [facility] due to chest pain. LHC on 10/5/2021 revealed patent LAD stent, other coronaries: LM - normal, LCx - normal, RCA - mild atherosclerosis with no flow limiting lesions. Request for limited Echo to evaluate resolution of apical thrombus was denied according to patient.\par \par No cp or sob.\par \par EKG: NSR, old AS MI, no st/t changes.\par TTE from 4/4/22 : Normal LVEF, 60-65%, no significant VHD

## 2022-10-07 ENCOUNTER — NON-APPOINTMENT (OUTPATIENT)
Age: 47
End: 2022-10-07

## 2022-10-11 NOTE — ED ADULT NURSE NOTE - OBJECTIVE STATEMENT
Pt. presents alert and oriented x 4 to ED complaining of multiple medical complaints. Pt. states she tested COVID-19+ on 9/17, and she presented to [facility] on Thursday 9/23 complaining of 10/10 midsternal chest pressure with associated indigestion and diaphoresis. Pt. reports she was found to be having an MI and had 1 cardiac stent placed there "in the main artery." Pt. states afterwards she was still complaining of chest pain "but nobody took me seriously." Pt. states she finally got a chest CTA there and they found "I had a blood clot in my heart." Pt. states she was then started on a heparin gtt. Pt. reports she signed out AMA from there due to "they weren't taking care of me, I didn't feel safe, so I left there and came here." Pt. reports HERNANDEZ however reports she is comfortable at rest. Pt. at this time denies chest pain, SOB, palpitations, syncope, diaphoresis, N/V/D, fever, body aches, chills, weakness. 20

## 2022-10-12 NOTE — ED ADULT NURSE NOTE - NSSEPSISNEWALTERMENTAL_ED_A_ED
HR=65 bpm, SXMJ=299/57 mmhg, SpO2=99.0 %, Resp=11 B/min, EtCO2=35 mmHg, Apnea=8 Seconds, Pain=0, Leiva=2, Comment=sr No

## 2022-11-10 ENCOUNTER — NON-APPOINTMENT (OUTPATIENT)
Age: 47
End: 2022-11-10

## 2022-11-10 ENCOUNTER — APPOINTMENT (OUTPATIENT)
Dept: CARDIOLOGY | Facility: CLINIC | Age: 47
End: 2022-11-10

## 2022-11-10 VITALS
WEIGHT: 171 LBS | HEART RATE: 79 BPM | BODY MASS INDEX: 30.3 KG/M2 | SYSTOLIC BLOOD PRESSURE: 102 MMHG | HEIGHT: 63 IN | TEMPERATURE: 98.3 F | DIASTOLIC BLOOD PRESSURE: 60 MMHG | OXYGEN SATURATION: 97 %

## 2022-11-10 PROCEDURE — 93000 ELECTROCARDIOGRAM COMPLETE: CPT

## 2022-11-10 PROCEDURE — 99214 OFFICE O/P EST MOD 30 MIN: CPT | Mod: 25

## 2022-11-10 RX ORDER — COVID-19 ANTIGEN TEST
KIT MISCELLANEOUS
Qty: 2 | Refills: 0 | Status: DISCONTINUED | COMMUNITY
Start: 2022-10-08

## 2022-11-10 RX ORDER — AMOXICILLIN AND CLAVULANATE POTASSIUM 875; 125 MG/1; MG/1
875-125 TABLET, COATED ORAL
Qty: 14 | Refills: 0 | Status: DISCONTINUED | COMMUNITY
Start: 2022-10-08

## 2022-11-10 RX ORDER — ASPIRIN ENTERIC COATED TABLETS 81 MG 81 MG/1
81 TABLET, DELAYED RELEASE ORAL
Qty: 90 | Refills: 3 | Status: DISCONTINUED | COMMUNITY
Start: 2022-04-06 | End: 2022-11-10

## 2022-11-21 ENCOUNTER — APPOINTMENT (OUTPATIENT)
Dept: CARDIOLOGY | Facility: CLINIC | Age: 47
End: 2022-11-21

## 2022-11-21 PROCEDURE — 93015 CV STRESS TEST SUPVJ I&R: CPT

## 2022-11-21 PROCEDURE — A9500: CPT

## 2022-11-21 PROCEDURE — 78452 HT MUSCLE IMAGE SPECT MULT: CPT

## 2022-12-07 ENCOUNTER — APPOINTMENT (OUTPATIENT)
Dept: CARDIOLOGY | Facility: CLINIC | Age: 47
End: 2022-12-07

## 2022-12-15 ENCOUNTER — NON-APPOINTMENT (OUTPATIENT)
Age: 47
End: 2022-12-15

## 2022-12-16 RX ORDER — CHLORHEXIDINE GLUCONATE 213 G/1000ML
1 SOLUTION TOPICAL ONCE
Refills: 0 | Status: DISCONTINUED | OUTPATIENT
Start: 2022-12-19 | End: 2023-01-02

## 2022-12-16 NOTE — H&P PST ADULT - ASSESSMENT
Impression:  47F PMH MI/CAD s/p PCI with JODI to LAD ([facility] 9/2021) at that time with LV apical thrombus (on eliquis),  Lupus, Asthma/copd, childhood heart murmur, GERD, Bipolar, recent Syncopal episode 9/2022 with complaints of left sided chest discomfort during stress/exertion with abnormal NST 11/21/22 revealing medium sized, mild to moderate defects in anterior and anteroseptal walls that are partially reversible, suggestive of infarction with mild melani- infarct ischemia, LVEF of 65%. Patient presents to [facility] cardiac catheterization lab for elective LHC for further ischemic evaluation.       Risk Assessments:  ASA:  Mallampati:  GFR:   Cr:  BRA:    Plan:    -plan for *** via ***  -patient seen and examined  -confirmed appropriate NPO duration  -ECG and Labs reviewed  -Aspirin 81mg po pre-cath  -procedure discussed with patient; risks and benefits explained, questions answered  -consent obtained by attending IC  -IV hydration protocol ordered to prevent CANDELARIO Impression:  47F PMH MI/CAD s/p PCI with JODI to LAD ([facility] 9/2021) at that time with LV apical thrombus (on eliquis),  Lupus, Asthma/copd, childhood heart murmur, GERD, Bipolar, recent Syncopal episode 9/2022 with complaints of left sided chest discomfort during stress/exertion with abnormal NST 11/21/22 revealing medium sized, mild to moderate defects in anterior and anteroseptal walls that are partially reversible, suggestive of infarction with mild melani- infarct ischemia, LVEF of 65%. Patient presents to [facility] cardiac catheterization lab for elective LHC for further ischemic evaluation.       Risk Assessments:  ASA: 3  Mallampati: 2  GFR: 109  Cr: 0.63  BRA: 1.9%    Plan:    -plan for LHC  -patient seen and examined  -confirmed appropriate NPO duration  -ECG and Labs reviewed  -Aspirin 81mg po pre-cath  -procedure discussed with patient; risks and benefits explained, questions answered  -consent obtained by attending IC  -IV hydration protocol ordered to prevent CANDELARIO

## 2022-12-16 NOTE — H&P PST ADULT - LAST CARDIAC ANGIOGRAM/IMAGING
9/2021 MI- s/p LAD stent at [facility]. 10/5/2021 LHC: LM: normal; LAD 0% stenosis in-stent; Lcx: normal; RCA: mild atherosclerosis with no flow limiting lesions.

## 2022-12-16 NOTE — H&P PST ADULT - LAST STRESS TEST
11/21/22 Regadenoson Nuclear Stress Test: no ischemic ekg changes,  Medium sized, mild to moderate defects in anterior and anteroseptal walls that are partially reversible, suggestive of infarction with mild melani-infarct ischemia, LVEF 65%

## 2022-12-16 NOTE — H&P PST ADULT - NSICDXPASTMEDICALHX_GEN_ALL_CORE_FT
PAST MEDICAL HISTORY:   x 6    Asthma     Bipolar 1 disorder, depressed     CAD (coronary atherosclerotic disease)     Ectopic pregnancy x 3    GERD (gastroesophageal reflux disease)     History of heart murmur in childhood     Left ventricular apical thrombus     Lupus     NSTEMI (non-ST elevation myocardial infarction)     Palpitations     Thyroid nodule 2016

## 2022-12-16 NOTE — H&P PST ADULT - LAST ECHOCARDIOGRAM
TTE 4/4/2022: Global LV systolic function is normal. LVEF 60-65%. Normal pattern of LV diastolic filling. Mid and apical inferior septum are hypokinetic. Normal RV size & function. Trace MVR. Mild TR. Thickening of anterior and posterior mitral valve leaflets. No evidence of pericardial effusion. No apical thrombus.

## 2022-12-16 NOTE — H&P PST ADULT - HISTORY OF PRESENT ILLNESS
47F PMH MI/CAD s/p PCI with JODI to LAD ([facility] 9/2021) at that time with LV apical thrombus (on eliquis),  Lupus, Asthma/copd, childhood heart murmur, GERD, Bipolar, recent Syncopal episode 9/2022 - likely related to taking extra Trazadone pill with complaints of left sided chest discomfort when stressed at times when using staircase; resolves after 1-2 minutes with rest. She also has been having palpitations with increased stress level. She has the monitor but did not wear it yet. She denies any more syncopal episodes since 9/2022. She underwent a nuclear stress test on 11/21/22 revealing medium sized, mild to moderate defects in anterior and anteroseptal walls that are partially reversible, suggestive of infarction with mild melani- infarct ischemia, LVEF of 65%. Patient presents to [facility] cardiac catheterization lab for elective LHC for further ischemic evaluation.     Symptoms:        Angina (Class): II       Ischemic Symptoms: chest pressure    Heart Failure:        Systolic/Diastolic/Combined: n/a       NYHA Class (within 2 weeks):     Assessment of LVEF (Must be within 6 months):        EF: TTE 4/4/2022: Global LV systolic function is normal. LVEF 60-65%. Normal pattern of LV diastolic filling. Mid and apical inferior septum are hypokinetic. Normal RV size & function. Trace MVR. Mild TR. Thickening of anterior and posterior mitral valve leaflets. No evidence of pericardial effusion. No apical thrombus.        Assessed by: TTE       Date: 4/4/2022    Prior Cardiac Interventions:       PCI's (Date, Stents, Vessels): 9/2021 MI- s/p LAD stent at [facility]. 10/5/2021 LHC: LM: normal; LAD 0% stenosis in-stent; Lcx: normal; RCA: mild atherosclerosis with no flow limiting lesions.        CABG (Date, Grafts): n/a    Noninvasive Testing: Nuclear Stress test  Stress Test: Date: 11/21/22       Protocol: Pharmacologic       Duration of Exercise: n/a       Symptoms: no chest pain with administration of regadenoson       EKG Changes: none       Myocardial Imaging: Medium sized, mild to moderate defects in anterior and anteroseptal walls that are partially reversible, suggestive of infarction with mild melani-infarct ischemia, LVEF 65%       Risk Assessment (Low, Medium, High): High    Echo (Date, Findings): TTE 4/4/2022: Global LV systolic function is normal. LVEF 60-65%. Normal pattern of LV diastolic filling. Mid and apical inferior septum are hypokinetic. Normal RV size & function. Trace MVR. Mild TR. Thickening of anterior and posterior mitral valve leaflets. No evidence of pericardial effusion. No apical thrombus.     Antianginal Therapies:        Beta Blockers: Metoprolol 12.5mg PO BID       Calcium Channel Blockers: n/a       Long Acting Nitrates: n/a       Ranexa: n/a    Associated Risk Factors:        Cerebrovascular Disease: N/A       Chronic Lung Disease: Asthma/ COPD       Peripheral Arterial Disease: N/A       Chronic Kidney Disease (if yes, what is GFR): N/A       Uncontrolled Diabetes (if yes, what is HgbA1C or FBS): N/A       Poorly Controlled Hypertension (if yes, what is SBP): N/A       Morbid Obesity (if yes, what is BMI): N/A       History of Recent Ventricular Arrhythmia: N/A       Inability to Ambulate Safely: N/A       Need for Therapeutic Anticoagulation: N/A       Antiplatelet or Contrast Allergy: N/A     47F PMH MI/CAD s/p PCI with JODI to LAD ([facility] 9/2021) at that time with LV apical thrombus (on eliquis),  Lupus, Asthma/copd, childhood heart murmur, GERD, Bipolar, recent Syncopal episode 9/2022 - likely related to taking extra Trazadone pill with complaints of left sided chest discomfort when stressed at times when using staircase; resolves after 1-2 minutes with rest. She also has been having palpitations with increased stress level. She has the monitor but did not wear it yet. She denies any more syncopal episodes since 9/2022. She underwent a nuclear stress test on 11/21/22 revealing medium sized, mild to moderate defects in anterior and anteroseptal walls that are partially reversible, suggestive of infarction with mild melani- infarct ischemia, LVEF of 65%. Patient presents to [facility] cardiac catheterization lab for elective LHC for further ischemic evaluation.     Symptoms:        Angina (Class): III       Ischemic Symptoms: chest pressure     Heart Failure: NONE         Assessment of LVEF (Must be within 6 months):        EF: TTE 4/4/2022: Global LV systolic function is normal. LVEF 60-65%. Normal pattern of LV diastolic filling. Mid and apical inferior septum are hypokinetic. Normal RV size & function. Trace MVR. Mild TR. Thickening of anterior and posterior mitral valve leaflets. No evidence of pericardial effusion. No apical thrombus.        Assessed by: TTE       Date: 4/4/2022    Prior Cardiac Interventions:       PCI's (Date, Stents, Vessels): 9/2021 MI- s/p LAD stent at [facility]. 10/5/2021 LHC: LM: normal; LAD 0% stenosis in-stent; Lcx: normal; RCA: mild atherosclerosis with no flow limiting lesions.        CABG (Date, Grafts): n/a    Noninvasive Testing: Nuclear Stress test  Stress Test: Date: 11/21/22       Protocol: Pharmacologic       Duration of Exercise: n/a       Symptoms: no chest pain with administration of regadenoson       EKG Changes: none       Myocardial Imaging: Medium sized, mild to moderate defects in anterior and anteroseptal walls that are partially reversible, suggestive of infarction with mild melani-infarct ischemia, LVEF 65%       Risk Assessment (Low, Medium, High): High    Echo (Date, Findings): TTE 4/4/2022: Global LV systolic function is normal. LVEF 60-65%. Normal pattern of LV diastolic filling. Mid and apical inferior septum are hypokinetic. Normal RV size & function. Trace MVR. Mild TR. Thickening of anterior and posterior mitral valve leaflets. No evidence of pericardial effusion. No apical thrombus.     Antianginal Therapies:        Beta Blockers: Metoprolol 12.5mg PO BID       Calcium Channel Blockers: n/a       Long Acting Nitrates: n/a       Ranexa: n/a    Associated Risk Factors:        Cerebrovascular Disease: N/A       Chronic Lung Disease: Asthma/ COPD       Peripheral Arterial Disease: N/A       Chronic Kidney Disease (if yes, what is GFR): N/A       Uncontrolled Diabetes (if yes, what is HgbA1C or FBS): N/A       Poorly Controlled Hypertension (if yes, what is SBP): N/A       Morbid Obesity (if yes, what is BMI): N/A       History of Recent Ventricular Arrhythmia: N/A       Inability to Ambulate Safely: N/A       Need for Therapeutic Anticoagulation: N/A       Antiplatelet or Contrast Allergy: N/A

## 2022-12-19 ENCOUNTER — TRANSCRIPTION ENCOUNTER (OUTPATIENT)
Age: 47
End: 2022-12-19

## 2022-12-19 ENCOUNTER — OUTPATIENT (OUTPATIENT)
Dept: OUTPATIENT SERVICES | Facility: HOSPITAL | Age: 47
LOS: 1 days | Discharge: HOME | End: 2022-12-19
Payer: MEDICAID

## 2022-12-19 VITALS
HEART RATE: 79 BPM | DIASTOLIC BLOOD PRESSURE: 70 MMHG | SYSTOLIC BLOOD PRESSURE: 115 MMHG | TEMPERATURE: 98 F | RESPIRATION RATE: 20 BRPM | OXYGEN SATURATION: 100 %

## 2022-12-19 VITALS
SYSTOLIC BLOOD PRESSURE: 90 MMHG | OXYGEN SATURATION: 99 % | DIASTOLIC BLOOD PRESSURE: 53 MMHG | RESPIRATION RATE: 16 BRPM

## 2022-12-19 DIAGNOSIS — Z90.89 ACQUIRED ABSENCE OF OTHER ORGANS: Chronic | ICD-10-CM

## 2022-12-19 DIAGNOSIS — R79.89 OTHER SPECIFIED ABNORMAL FINDINGS OF BLOOD CHEMISTRY: ICD-10-CM

## 2022-12-19 DIAGNOSIS — Z90.721 ACQUIRED ABSENCE OF OVARIES, UNILATERAL: Chronic | ICD-10-CM

## 2022-12-19 DIAGNOSIS — Z30.8 ENCOUNTER FOR OTHER CONTRACEPTIVE MANAGEMENT: Chronic | ICD-10-CM

## 2022-12-19 LAB
ALBUMIN SERPL ELPH-MCNC: 3.8 G/DL — SIGNIFICANT CHANGE UP (ref 3.3–5.2)
ALP SERPL-CCNC: 113 U/L — SIGNIFICANT CHANGE UP (ref 40–120)
ALT FLD-CCNC: 9 U/L — SIGNIFICANT CHANGE UP
ANION GAP SERPL CALC-SCNC: 11 MMOL/L — SIGNIFICANT CHANGE UP (ref 5–17)
APTT BLD: 30.6 SEC — SIGNIFICANT CHANGE UP (ref 27.5–35.5)
AST SERPL-CCNC: 15 U/L — SIGNIFICANT CHANGE UP
BASOPHILS # BLD AUTO: 0.03 K/UL — SIGNIFICANT CHANGE UP (ref 0–0.2)
BASOPHILS NFR BLD AUTO: 0.3 % — SIGNIFICANT CHANGE UP (ref 0–2)
BILIRUB SERPL-MCNC: <0.2 MG/DL — LOW (ref 0.4–2)
BUN SERPL-MCNC: 12.9 MG/DL — SIGNIFICANT CHANGE UP (ref 8–20)
CALCIUM SERPL-MCNC: 8.8 MG/DL — SIGNIFICANT CHANGE UP (ref 8.4–10.5)
CHLORIDE SERPL-SCNC: 104 MMOL/L — SIGNIFICANT CHANGE UP (ref 96–108)
CHOLEST SERPL-MCNC: 166 MG/DL — SIGNIFICANT CHANGE UP
CO2 SERPL-SCNC: 22 MMOL/L — SIGNIFICANT CHANGE UP (ref 22–29)
CREAT SERPL-MCNC: 0.63 MG/DL — SIGNIFICANT CHANGE UP (ref 0.5–1.3)
EGFR: 109 ML/MIN/1.73M2 — SIGNIFICANT CHANGE UP
EOSINOPHIL # BLD AUTO: 0.12 K/UL — SIGNIFICANT CHANGE UP (ref 0–0.5)
EOSINOPHIL NFR BLD AUTO: 1.4 % — SIGNIFICANT CHANGE UP (ref 0–6)
GLUCOSE SERPL-MCNC: 101 MG/DL — HIGH (ref 70–99)
HCG SERPL QL: NEGATIVE — SIGNIFICANT CHANGE UP
HCT VFR BLD CALC: 32.6 % — LOW (ref 34.5–45)
HDLC SERPL-MCNC: 46 MG/DL — LOW
HGB BLD-MCNC: 10.5 G/DL — LOW (ref 11.5–15.5)
IMM GRANULOCYTES NFR BLD AUTO: 0.3 % — SIGNIFICANT CHANGE UP (ref 0–0.9)
INR BLD: 1.01 RATIO — SIGNIFICANT CHANGE UP (ref 0.88–1.16)
LIPID PNL WITH DIRECT LDL SERPL: 102 MG/DL — HIGH
LYMPHOCYTES # BLD AUTO: 2.76 K/UL — SIGNIFICANT CHANGE UP (ref 1–3.3)
LYMPHOCYTES # BLD AUTO: 31.8 % — SIGNIFICANT CHANGE UP (ref 13–44)
MAGNESIUM SERPL-MCNC: 1.8 MG/DL — SIGNIFICANT CHANGE UP (ref 1.6–2.6)
MCHC RBC-ENTMCNC: 26.4 PG — LOW (ref 27–34)
MCHC RBC-ENTMCNC: 32.2 GM/DL — SIGNIFICANT CHANGE UP (ref 32–36)
MCV RBC AUTO: 82.1 FL — SIGNIFICANT CHANGE UP (ref 80–100)
MONOCYTES # BLD AUTO: 0.83 K/UL — SIGNIFICANT CHANGE UP (ref 0–0.9)
MONOCYTES NFR BLD AUTO: 9.6 % — SIGNIFICANT CHANGE UP (ref 2–14)
NEUTROPHILS # BLD AUTO: 4.9 K/UL — SIGNIFICANT CHANGE UP (ref 1.8–7.4)
NEUTROPHILS NFR BLD AUTO: 56.6 % — SIGNIFICANT CHANGE UP (ref 43–77)
NON HDL CHOLESTEROL: 120 MG/DL — SIGNIFICANT CHANGE UP
PLATELET # BLD AUTO: 337 K/UL — SIGNIFICANT CHANGE UP (ref 150–400)
POTASSIUM SERPL-MCNC: 4.4 MMOL/L — SIGNIFICANT CHANGE UP (ref 3.5–5.3)
POTASSIUM SERPL-SCNC: 4.4 MMOL/L — SIGNIFICANT CHANGE UP (ref 3.5–5.3)
PROT SERPL-MCNC: 6.7 G/DL — SIGNIFICANT CHANGE UP (ref 6.6–8.7)
PROTHROM AB SERPL-ACNC: 11.7 SEC — SIGNIFICANT CHANGE UP (ref 10.5–13.4)
RBC # BLD: 3.97 M/UL — SIGNIFICANT CHANGE UP (ref 3.8–5.2)
RBC # FLD: 15.9 % — HIGH (ref 10.3–14.5)
SODIUM SERPL-SCNC: 137 MMOL/L — SIGNIFICANT CHANGE UP (ref 135–145)
TRIGL SERPL-MCNC: 90 MG/DL — SIGNIFICANT CHANGE UP
WBC # BLD: 8.67 K/UL — SIGNIFICANT CHANGE UP (ref 3.8–10.5)
WBC # FLD AUTO: 8.67 K/UL — SIGNIFICANT CHANGE UP (ref 3.8–10.5)

## 2022-12-19 PROCEDURE — 83735 ASSAY OF MAGNESIUM: CPT

## 2022-12-19 PROCEDURE — 80053 COMPREHEN METABOLIC PANEL: CPT

## 2022-12-19 PROCEDURE — 93005 ELECTROCARDIOGRAM TRACING: CPT

## 2022-12-19 PROCEDURE — 85025 COMPLETE CBC W/AUTO DIFF WBC: CPT

## 2022-12-19 PROCEDURE — 85610 PROTHROMBIN TIME: CPT

## 2022-12-19 PROCEDURE — 84703 CHORIONIC GONADOTROPIN ASSAY: CPT

## 2022-12-19 PROCEDURE — C1769: CPT

## 2022-12-19 PROCEDURE — 36415 COLL VENOUS BLD VENIPUNCTURE: CPT

## 2022-12-19 PROCEDURE — C1887: CPT

## 2022-12-19 PROCEDURE — 85730 THROMBOPLASTIN TIME PARTIAL: CPT

## 2022-12-19 PROCEDURE — 99152 MOD SED SAME PHYS/QHP 5/>YRS: CPT

## 2022-12-19 PROCEDURE — 80061 LIPID PANEL: CPT

## 2022-12-19 PROCEDURE — C1894: CPT

## 2022-12-19 PROCEDURE — 93458 L HRT ARTERY/VENTRICLE ANGIO: CPT

## 2022-12-19 PROCEDURE — 93458 L HRT ARTERY/VENTRICLE ANGIO: CPT | Mod: 26,59

## 2022-12-19 PROCEDURE — 93010 ELECTROCARDIOGRAM REPORT: CPT

## 2022-12-19 RX ORDER — FERROUS SULFATE 325(65) MG
1 TABLET ORAL
Qty: 0 | Refills: 0 | DISCHARGE

## 2022-12-19 RX ORDER — SODIUM CHLORIDE 9 MG/ML
250 INJECTION INTRAMUSCULAR; INTRAVENOUS; SUBCUTANEOUS
Refills: 0 | Status: DISCONTINUED | OUTPATIENT
Start: 2022-12-19 | End: 2023-01-02

## 2022-12-19 RX ORDER — METOPROLOL TARTRATE 50 MG
0.5 TABLET ORAL
Qty: 0 | Refills: 0 | DISCHARGE

## 2022-12-19 RX ORDER — CLOPIDOGREL BISULFATE 75 MG/1
1 TABLET, FILM COATED ORAL
Qty: 0 | Refills: 0 | DISCHARGE

## 2022-12-19 RX ORDER — ATORVASTATIN CALCIUM 80 MG/1
1 TABLET, FILM COATED ORAL
Qty: 0 | Refills: 0 | DISCHARGE

## 2022-12-19 RX ORDER — SERTRALINE 25 MG/1
1 TABLET, FILM COATED ORAL
Qty: 0 | Refills: 0 | DISCHARGE

## 2022-12-19 RX ORDER — ALBUTEROL 90 UG/1
2 AEROSOL, METERED ORAL
Qty: 0 | Refills: 0 | DISCHARGE

## 2022-12-19 RX ORDER — METOPROLOL TARTRATE 50 MG
1 TABLET ORAL
Qty: 0 | Refills: 0 | DISCHARGE

## 2022-12-19 RX ORDER — ERGOCALCIFEROL 1.25 MG/1
1 CAPSULE ORAL
Qty: 0 | Refills: 0 | DISCHARGE

## 2022-12-19 RX ORDER — ASPIRIN/CALCIUM CARB/MAGNESIUM 324 MG
81 TABLET ORAL ONCE
Refills: 0 | Status: COMPLETED | OUTPATIENT
Start: 2022-12-19 | End: 2022-12-19

## 2022-12-19 RX ORDER — CARIPRAZINE 1.5 MG/1
1 CAPSULE, GELATIN COATED ORAL
Qty: 0 | Refills: 0 | DISCHARGE

## 2022-12-19 RX ORDER — ASPIRIN/CALCIUM CARB/MAGNESIUM 324 MG
1 TABLET ORAL
Qty: 0 | Refills: 0 | DISCHARGE

## 2022-12-19 RX ORDER — ZOLPIDEM TARTRATE 10 MG/1
1 TABLET ORAL
Qty: 0 | Refills: 0 | DISCHARGE

## 2022-12-19 RX ORDER — PANTOPRAZOLE SODIUM 20 MG/1
1 TABLET, DELAYED RELEASE ORAL
Qty: 0 | Refills: 0 | DISCHARGE

## 2022-12-19 RX ADMIN — Medication 81 MILLIGRAM(S): at 08:46

## 2022-12-19 RX ADMIN — SODIUM CHLORIDE 250 MILLILITER(S): 9 INJECTION INTRAMUSCULAR; INTRAVENOUS; SUBCUTANEOUS at 08:47

## 2022-12-19 NOTE — DISCHARGE NOTE PROVIDER - HOSPITAL COURSE
47F PMH MI/CAD s/p PCI with JODI to LAD ([facility] 9/2021) at that time with LV apical thrombus (on eliquis),  Lupus, Asthma/copd, childhood heart murmur, GERD, Bipolar, recent Syncopal episode 9/2022 - likely related to taking extra Trazadone pill with complaints of left sided chest discomfort when stressed at times when using staircase; resolves after 1-2 minutes with rest. She also has been having palpitations with increased stress level. She has the monitor but did not wear it yet. She denies any more syncopal episodes since 9/2022. She underwent a nuclear stress test on 11/21/22 revealing medium sized, mild to moderate defects in anterior and anteroseptal walls that are partially reversible, suggestive of infarction with mild melani- infarct ischemia, LVEF of 65%. Patient presents to [facility] cardiac catheterization lab for elective LHC for further ischemic evaluation.  Now s/p LHC via RRA with Dr. Sanches: patent LAD stent; coronaries unchanged from previous LHC 10/5/2021; no intervention needed (prelim report; official report to follow).    -Bedrest x 1 hour post procedure then OOB  -Remove radial band one hour post procedure at 1130  -IVF hydration post procedure as per protocol to prevent CANDELARIO  -Maintain current med regimen  -Activity instructions discussed with patient verbal understanding  -Follow up with Dr. Sanches in one to two weeks  -Discussed with Dr. Sanches

## 2022-12-19 NOTE — DISCHARGE NOTE PROVIDER - NSDCCPCAREPLAN_GEN_ALL_CORE_FT
PRINCIPAL DISCHARGE DIAGNOSIS  Diagnosis: CAD (coronary artery disease)  Assessment and Plan of Treatment: LAD stent patent; continue med management

## 2022-12-19 NOTE — DISCHARGE NOTE NURSING/CASE MANAGEMENT/SOCIAL WORK - PATIENT PORTAL LINK FT
You can access the FollowMyHealth Patient Portal offered by [facility] by registering at the following website: [email or web address]. By joining [employer]’s FollowMyHealth portal, you will also be able to view your health information using other applications (apps) compatible with our system.

## 2022-12-19 NOTE — DISCHARGE NOTE NURSING/CASE MANAGEMENT/SOCIAL WORK - NSDCPEFALRISK_GEN_ALL_CORE
For information on Fall & Injury Prevention, visit: [email or web address] OR  [email or web address] OR  https://www.cdc.gov/steadi/patient.html

## 2022-12-19 NOTE — DISCHARGE NOTE NURSING/CASE MANAGEMENT/SOCIAL WORK - NSDCVIVACCINE_GEN_ALL_CORE_FT
Tdap; 11-Oct-2021 01:03; Aquilino Dominguez (RN); [employer]; [id number] (Exp. Date: 15-Jul-2023); IntraMuscular; Deltoid Left.; 0.5 milliLiter(s); VIS (VIS Published: 09-May-2013, VIS Presented: 11-Oct-2021);

## 2022-12-19 NOTE — DISCHARGE NOTE PROVIDER - NSDCMRMEDTOKEN_GEN_ALL_CORE_FT
ALBUTEROL SULFATE HFA  INH: 2 puff(s) inhaled every 4 hours, As Needed shortness of breath  ATORVASTATIN CALCIUM 40MG TAB: 1 tab(s) orally once a day  CLOPIDOGREL 75MG TAB: 1 tab(s) orally once a day  METOPROLOL TARTRATE 25MG TAB: 0.5 tab(s) orally 2 times a day  PANTOPRAZOLE SODIUM 40MG DR TAB: 1 tab(s) orally once a day  sertraline 50 mg oral tablet: 1 tab(s) orally once a day  VRAYLAR 3MG CAP: 1 cap(s) orally once a day  zolpidem 5 mg oral tablet: 1 tab(s) orally once a day (at bedtime), As Needed

## 2022-12-19 NOTE — PROGRESS NOTE ADULT - ASSESSMENT
A/P: 47F PMH MI/CAD s/p PCI with JODI to LAD ([facility] 9/2021) at that time with LV apical thrombus (on eliquis),  Lupus, Asthma/copd, childhood heart murmur, GERD, Bipolar, recent Syncopal episode 9/2022 - likely related to taking extra Trazadone pill with complaints of left sided chest discomfort when stressed at times when using staircase; resolves after 1-2 minutes with rest. She also has been having palpitations with increased stress level. She has the monitor but did not wear it yet. She denies any more syncopal episodes since 9/2022. She underwent a nuclear stress test on 11/21/22 revealing medium sized, mild to moderate defects in anterior and anteroseptal walls that are partially reversible, suggestive of infarction with mild melani- infarct ischemia, LVEF of 65%. Patient presents to [facility] cardiac catheterization lab for elective LHC for further ischemic evaluation.  Now s/p LHC via RRA with Dr. Sanches: patent LAD stent; coronaries unchanged from previous LHC 10/5/2021; no intervention needed (prelim report; official report to follow).    -Bedrest x 1 hour post procedure then OOB  -Remove radial band one hour post procedure at 1130  -Maintain current med regimen  -Activity instructions discussed with patient verbal understanding  -Follow up with Cardiologist in one to two weeks  -Discussed with Dr. Sanches         A/P: 47F PMH MI/CAD s/p PCI with JODI to LAD ([facility] 9/2021) at that time with LV apical thrombus (on eliquis),  Lupus, Asthma/copd, childhood heart murmur, GERD, Bipolar, recent Syncopal episode 9/2022 - likely related to taking extra Trazadone pill with complaints of left sided chest discomfort when stressed at times when using staircase; resolves after 1-2 minutes with rest. She also has been having palpitations with increased stress level. She has the monitor but did not wear it yet. She denies any more syncopal episodes since 9/2022. She underwent a nuclear stress test on 11/21/22 revealing medium sized, mild to moderate defects in anterior and anteroseptal walls that are partially reversible, suggestive of infarction with mild melani- infarct ischemia, LVEF of 65%. Patient presents to [facility] cardiac catheterization lab for elective LHC for further ischemic evaluation.  Now s/p LHC via RRA with Dr. Sanches: patent LAD stent; coronaries unchanged from previous LHC 10/5/2021; no intervention needed (prelim report; official report to follow).    -Bedrest x 1 hour post procedure then OOB  -Remove radial band one hour post procedure at 1130  -IVF hydration post procedure as per protocol to prevent CANDELARIO  -Maintain current med regimen  -Activity instructions discussed with patient verbal understanding  -Follow up with Dr. Sanches in one to two weeks  -Discussed with Dr. Sanches

## 2022-12-19 NOTE — DISCHARGE NOTE PROVIDER - NSDCFUSCHEDAPPT_GEN_ALL_CORE_FT
[facility] Physician [facility]  CARDIOLOGY [address]  Scheduled Appointment: 03/15/2023

## 2022-12-19 NOTE — PROGRESS NOTE ADULT - SUBJECTIVE AND OBJECTIVE BOX
Interventional Cardiology post procedure note:    -s/p LHC via RRA with Dr. Sanches: patent LAD stent; coronaries unchanged from previous [facility] 10/5/2021; no intervention needed (prelim report; official report to follow)      TELE: NSR    MEDICATIONS  (STANDING):  chlorhexidine 4% Liquid 1 Application(s) Topical Once  sodium chloride 0.9%. 250 milliLiter(s) (250 mL/Hr) IV Continuous <Continuous>  sodium chloride 0.9%. 250 milliLiter(s) (250 mL/Hr) IV Continuous <Continuous>      Allergies:  No Known Allergies      PAST MEDICAL & SURGICAL HISTORY:  Lupus      Bipolar 1 disorder, depressed      Asthma      Ectopic pregnancy  x 3        x 6      History of heart murmur in childhood      Palpitations      Thyroid nodule  2016      GERD (gastroesophageal reflux disease)      CAD (coronary atherosclerotic disease)      NSTEMI (non-ST elevation myocardial infarction)      Left ventricular apical thrombus      H/O oophorectomy  right      S/P tonsillectomy      Encounter for post Essure sterilization check          Vital Signs Last 24 Hrs  T(C): 36.7 (19 Dec 2022 08:05), Max: 36.7 (19 Dec 2022 08:05)  T(F): 98.1 (19 Dec 2022 08:05), Max: 98.1 (19 Dec 2022 08:05)  HR: 62 (19 Dec 2022 10:30) (62 - 79)  BP: 97/61 (19 Dec 2022 10:30) (97/61 - 115/70)  BP(mean): --  RR: 20 (19 Dec 2022 10:30) (20 - 20)  SpO2: 96% (19 Dec 2022 10:30) (96% - 100%)    Parameters below as of 19 Dec 2022 10:30  Patient On (Oxygen Delivery Method): room air        Physical Exam:  Constitutional: NAD, AAOx3  Cardiovascular: +S1S2 RRR  Pulmonary: CTA b/l, unlabored  GI: soft NTND +BS  Extremities: no pedal edema, +distal pulses b/l  Neuro: non focal, OLIVIER x4  Procedure site: Right radial band in place; site benign without hematoma/bleeding; RUE warm/mobile/acyanotic; + ulnar pulse    LABS:                        10.5   8.67  )-----------( 337      ( 19 Dec 2022 07:40 )             32.6     12-19    137  |  104  |  12.9  ----------------------------<  101<H>  4.4   |  22.0  |  0.63    Ca    8.8      19 Dec 2022 07:40  Mg     1.8         TPro  6.7  /  Alb  3.8  /  TBili  <0.2<L>  /  DBili  x   /  AST  15  /  ALT  9   /  AlkPhos  113      PT/INR - ( 19 Dec 2022 07:40 )   PT: 11.7 sec;   INR: 1.01 ratio         PTT - ( 19 Dec 2022 07:40 )  PTT:30.6 sec      RADIOLOGY & ADDITIONAL TESTS:  < from: Cardiac Catheterization (10.05.21 @ 19:11) >  CORONARY VESSELS: The coronary circulation is right dominant.  LM:   --  LM: Normal.  LAD:   --  LAD: There was a 0 % stenosis in-stent.  CX:   --  Circumflex: Normal.  RCA:   --  RCA: Angiography showed mild atherosclerosis with no flow  limiting lesions.  COMPLICATIONS: No complications occurred during the cath lab visit.  DIAGNOSTIC IMPRESSIONS: Patent LAD stent.  DIAGNOSTIC RECOMMENDATIONS: Medical management.  Prepared and signed by  Reymundo Cleary MD    < end of copied text >

## 2022-12-19 NOTE — DISCHARGE NOTE PROVIDER - CARE PROVIDER_API CALL
Emiliano Sanches)  Cardiovascular Disease  [address] [facility], [address] [place] [id number]  Phone: [phone]  Fax: [phone]  Established Patient  Follow Up Time: 2 weeks

## 2022-12-29 DIAGNOSIS — I25.110 ATHEROSCLEROTIC HEART DISEASE OF NATIVE CORONARY ARTERY WITH UNSTABLE ANGINA PECTORIS: ICD-10-CM

## 2023-01-01 NOTE — ED ADULT TRIAGE NOTE - MEANS OF ARRIVAL
"CC: Cough (Diagnosed with RSV yesterday. Was tested Wednesday. Mom stated symptoms are worsening, \"moist cough and retracting.\" )     HPI:  This patient is an otherwise healthy 4-month-old female who was recently diagnosed with RSV bronchiolitis on 12/20 who presents to the emergency department with concerns for worsening respiratory distress.  Patient's parents state that the patient tested positive for RSV 2 days ago and had been doing well until he thought she was under more distress this morning.  Her Tmax was 2 days ago at 101 °F yesterday was in the 99's.  They have been providing supportive measures including Tylenol and Motrin as needed, humidifier use, albuterol as needed and suctioning.  Patient's mother states this morning upon waking she felt the patient had worse congestion and subcostal retractions and tracheal tugging.  Due to the signs they brought her in for evaluation.  They state that since bringing her in she has improved.  She continues to feed and make wet diapers.    Limitations to history: None  Independent historian(s): Patient's mother and father at bedside  Records Reviewed: Recent available ED and inpatient notes reviewed in EMR.    PMHx/PSHx:  Per HPI.   - has a past medical history of Hernia, abdominal and Inguinal hernia, bilateral (2023).  - has a past surgical history that includes Frenulectomy, lingual (2023) and Hernia repair (Bilateral, 2023).    Medications:  Reviewed in EMR. See EMR for complete list of medications and doses.    Allergies:  Patient has no known allergies.    Social History:  - Tobacco:  has no history on file for tobacco use.   - Alcohol:  has no history on file for alcohol use.   - Illicit Drugs:  has no history on file for drug use.     Birth History: No complications    Immunizations: Up-to-date    ROS:  Per HPI.       ???????????????????????????????????????????????????????????????  Triage Vitals:  T      BP (!) 109/76  RR 42  O2 " 97 %      Physical Exam    GENERAL: patient resting comfortably in mom's arms, comfortable and well-appearing, no acute distress, breathing easily, well-nourished and well-developed, and appropriately interactive.   HEAD: Normocephalic, atraumatic.   NECK: FROM. No lymphadenopathy.   EYES: EOMI. No scleral icterus or scleral injection.  ENT: Moist mucous membranes, no apparent trauma or lesions.  TMs visualized and normal without any fluid, erythema or bulging.  Mild congestion.  CARDIO: Normal rate and regular rhythm. No murmurs, rubs, or gallops appreciated.  2+ radial pulses bilaterally. Capillary refill <2 secs.   PULM: Normal work of breathing. Clear to auscultation bilaterally with symmetric chest expansion. No rales, rhonchi, or wheezes.  No signs of subcostal retractions or tracheal tugging.  GI: Soft, non-tender, non-distended.   SKIN: Warm and dry, no rashes or lesions.  MSK: Full ROM in bilateral upper and lower extremities. No joint swelling noted.  EXT: Warm and well perfused. No edema, contusions, or wounds.   NEURO: Alert and interactive. No focal neurological deficits.  Moves all extremities equally. Responsive to touch.      ???????????????????????????????????????????????????????????????  Labs:   Labs Reviewed - No data to display     Imaging:   No orders to display       MDM:  This patient is a 4-month-old female recently diagnosed with RSV bronchiolitis 2 days ago who presents to the emergency department with concerns for worsening respiratory distress.  She is hemodynamically stable and in no respiratory distress on arrival.  Her blood pressure is mildly elevated at 109/76 however other vitals are normal.  She is clinically very well-appearing.  Her exam is unremarkable.  There are no signs of subcostal retractions, tracheal tugging, or nasal flaring.  Patient is happy and playful on exam.  At this time I have no concerns for the patient's respiratory status and do not feel that she needs  supplemental oxygen therapy or inpatient level of care for respiratory support.  Parents understand and are in agreement that she appears well, comfortable with reassurance provided.  Discussed with the patient's parents continued supportive treatment for her RSV.  They expressed her understanding. This time the patient is appropriate for discharge home.  Strict return precautions provided and the parents expressed understanding.  She remained hemodynamically stable and will be discharged home.    ED Course:  Diagnoses as of 12/22/23 1548   Acute bronchiolitis due to respiratory syncytial virus       Social Determinants Limiting Care:  None identified    Disposition:  Discharge    Ramin Keenan DO   Emergency Medicine PGY-2  [facility]      Procedures ? SmartLinks last updated 2023 11:05 AM        Ramin Keenan DO  Resident  12/22/23 [id number]       Denise Jenkins MD  12/24/23 [id number]     ambulatory

## 2023-01-20 ENCOUNTER — NON-APPOINTMENT (OUTPATIENT)
Age: 48
End: 2023-01-20

## 2023-03-15 ENCOUNTER — APPOINTMENT (OUTPATIENT)
Dept: CARDIOLOGY | Facility: CLINIC | Age: 48
End: 2023-03-15

## 2023-03-15 PROBLEM — I51.3 INTRACARDIAC THROMBOSIS, NOT ELSEWHERE CLASSIFIED: Chronic | Status: ACTIVE | Noted: 2022-12-16

## 2023-03-15 PROBLEM — I21.4 NON-ST ELEVATION (NSTEMI) MYOCARDIAL INFARCTION: Chronic | Status: ACTIVE | Noted: 2022-12-16

## 2023-03-31 ENCOUNTER — APPOINTMENT (OUTPATIENT)
Dept: CARDIOLOGY | Facility: CLINIC | Age: 48
End: 2023-03-31

## 2023-04-20 ENCOUNTER — APPOINTMENT (OUTPATIENT)
Dept: CARDIOLOGY | Facility: CLINIC | Age: 48
End: 2023-04-20

## 2023-04-27 NOTE — ED ADULT NURSE NOTE - NSICDXFAMILYHX_GEN_ALL_CORE_FT
FAMILY HISTORY:  Family history of cancer in father  Family history of hypothyroidism     Propranolol Pregnancy And Lactation Text: This medication is Pregnancy Category C and it isn't known if it is safe during pregnancy. It is excreted in breast milk.

## 2023-05-30 NOTE — ED PROVIDER NOTE - BIRTH SEX
Female O-L Flap Text: The defect edges were debeveled with a #15 scalpel blade.  Given the location of the defect, shape of the defect and the proximity to free margins an O-L flap was deemed most appropriate.  Using a sterile surgical marker, an appropriate advancement flap was drawn incorporating the defect and placing the expected incisions within the relaxed skin tension lines where possible.    The area thus outlined was incised deep to adipose tissue with a #15 scalpel blade.  The skin margins were undermined to an appropriate distance in all directions utilizing iris scissors.

## 2023-05-30 NOTE — ASU PATIENT PROFILE, ADULT - PAIN SCALE PREFERRED, PROFILE
Impression: Drusen of optic disc, bilateral Plan: Appears very mild. Disc elevation OD> OS. RNFL done previously. Monitor. numerical 0-10

## 2023-08-09 ENCOUNTER — APPOINTMENT (OUTPATIENT)
Dept: CARDIOLOGY | Facility: CLINIC | Age: 48
End: 2023-08-09
Payer: MEDICAID

## 2023-08-09 VITALS
TEMPERATURE: 97.9 F | OXYGEN SATURATION: 98 % | DIASTOLIC BLOOD PRESSURE: 68 MMHG | BODY MASS INDEX: 28.35 KG/M2 | SYSTOLIC BLOOD PRESSURE: 104 MMHG | WEIGHT: 160 LBS | HEART RATE: 72 BPM | HEIGHT: 63 IN

## 2023-08-09 PROCEDURE — 93000 ELECTROCARDIOGRAM COMPLETE: CPT

## 2023-08-09 PROCEDURE — 99215 OFFICE O/P EST HI 40 MIN: CPT | Mod: 25

## 2023-08-09 PROCEDURE — 99406 BEHAV CHNG SMOKING 3-10 MIN: CPT

## 2023-08-09 NOTE — COUNSELING
[Use of nicotine replacement therapies and other medications discussed] : Use of nicotine replacement therapies and other medications discussed [Yes] : Willing to quit smoking

## 2023-08-09 NOTE — REVIEW OF SYSTEMS
[SOB] : no shortness of breath [Chest Discomfort] : chest discomfort [Lower Ext Edema] : no extremity edema [Palpitations] : palpitations [Syncope] : no syncope

## 2023-08-09 NOTE — ASSESSMENT
[FreeTextEntry1] : 46 y/o F, with history of CAD/MI s/p PCI to LAD - 9/2021 at [facility], HLD and bipolar disorder  A/P:  1.  CAD/MI s/p PCI to LAD - 9/2021 - cath after an abnormal stress test showed patent LAD stent, 2- hyperlipidemia on atorvastatin  3- Active smoker discussed about stopping smoking  4- right carotid bruit, will order carotid U/S .

## 2023-08-09 NOTE — PHYSICAL EXAM
[Well Developed] : well developed [Well Nourished] : well nourished [No Acute Distress] : no acute distress [Normal Venous Pressure] : normal venous pressure [Normal S1, S2] : normal S1, S2 [No Murmur] : no murmur [Clear Lung Fields] : clear lung fields [Good Air Entry] : good air entry [No Edema] : no edema [Moves all extremities] : moves all extremities [Alert and Oriented] : alert and oriented [de-identified] : right carotid bruit

## 2023-08-09 NOTE — HISTORY OF PRESENT ILLNESS
[FreeTextEntry1] : pt is seen today with an episode of syncope, CAD, HLD and prior MI  last Tuesday did not sleep well, she took more trazadone than usual.  She felt tired and went to go to bed. She fell and passed out. She also vomited and was nauseous.  2 days later she went to [facility], she had ct scan of head.  she has a lot of stress in family.   history of bipolar disorder, Covid 19 infection - 9/17/2021, went to [facility] for CP and diagnosed of MI s/p PCI to LAD, also found with LV apical thrombus - given heparin. She signed out from [facility] on 9/25/2021 and went to [facility] due to chest pain. LHC on 10/5/2021 revealed patent LAD stent, other coronaries: LM - normal, LCx - normal, RCA - mild atherosclerosis with no flow limiting lesions. Request for limited Echo to evaluate resolution of apical thrombus was denied according to patient.  No cp or sob.  EKG: NSR, old AS MI, no st/t changes. TTE from 4/4/22 : Normal LVEF, 60-65%, no significant VHD  11/10/2022 Returns for office visit. Has been having left sided chest discomfort when stressed and at times when using staircase; resolves after 1-2 minutes with rest. Also has been having palpitations with increased stress level. She has the monitor but did not wear yet. Denies syncopal episode since last visit. She will see her Psychiatrist and therapist ([facility]) later this month.  8/9/2023 returns for follow up . she still c/o of morning retrosternal chest pressure radiating to both shoulders and back , improves by relaxing and getting some air, It lasts for 5 minutes .  occasionally exertional as well .  she continues to smoke and is tyring to quit .

## 2023-08-15 RX ORDER — ATORVASTATIN CALCIUM 40 MG/1
40 TABLET, FILM COATED ORAL
Qty: 1 | Refills: 3 | Status: ACTIVE | COMMUNITY
Start: 2021-10-04 | End: 1900-01-01

## 2023-08-15 RX ORDER — CLOPIDOGREL BISULFATE 75 MG/1
75 TABLET, FILM COATED ORAL
Qty: 90 | Refills: 3 | Status: ACTIVE | COMMUNITY
Start: 2022-03-17 | End: 1900-01-01

## 2023-08-15 RX ORDER — METOPROLOL TARTRATE 25 MG/1
25 TABLET, FILM COATED ORAL
Qty: 90 | Refills: 3 | Status: ACTIVE | COMMUNITY
Start: 2021-10-04 | End: 1900-01-01

## 2023-08-15 RX ORDER — PANTOPRAZOLE 40 MG/1
40 TABLET, DELAYED RELEASE ORAL DAILY
Qty: 90 | Refills: 3 | Status: ACTIVE | COMMUNITY
Start: 2021-10-04 | End: 1900-01-01

## 2023-08-24 ENCOUNTER — NON-APPOINTMENT (OUTPATIENT)
Age: 48
End: 2023-08-24

## 2023-08-30 ENCOUNTER — NON-APPOINTMENT (OUTPATIENT)
Age: 48
End: 2023-08-30

## 2023-09-08 ENCOUNTER — APPOINTMENT (OUTPATIENT)
Dept: CARDIOLOGY | Facility: CLINIC | Age: 48
End: 2023-09-08

## 2023-09-20 ENCOUNTER — TRANSCRIPTION ENCOUNTER (OUTPATIENT)
Age: 48
End: 2023-09-20

## 2023-11-13 ENCOUNTER — APPOINTMENT (OUTPATIENT)
Dept: OBGYN | Facility: CLINIC | Age: 48
End: 2023-11-13
Payer: MEDICAID

## 2023-11-13 ENCOUNTER — EMERGENCY (EMERGENCY)
Facility: HOSPITAL | Age: 48
LOS: 1 days | End: 2023-11-13
Attending: STUDENT IN AN ORGANIZED HEALTH CARE EDUCATION/TRAINING PROGRAM
Payer: MEDICAID

## 2023-11-13 ENCOUNTER — NON-APPOINTMENT (OUTPATIENT)
Age: 48
End: 2023-11-13

## 2023-11-13 VITALS
HEIGHT: 63 IN | DIASTOLIC BLOOD PRESSURE: 70 MMHG | BODY MASS INDEX: 29.06 KG/M2 | SYSTOLIC BLOOD PRESSURE: 110 MMHG | WEIGHT: 164 LBS

## 2023-11-13 VITALS
HEART RATE: 86 BPM | OXYGEN SATURATION: 100 % | RESPIRATION RATE: 20 BRPM | TEMPERATURE: 98 F | HEIGHT: 63 IN | DIASTOLIC BLOOD PRESSURE: 64 MMHG | SYSTOLIC BLOOD PRESSURE: 98 MMHG | WEIGHT: 167.11 LBS

## 2023-11-13 DIAGNOSIS — Z90.721 ACQUIRED ABSENCE OF OVARIES, UNILATERAL: Chronic | ICD-10-CM

## 2023-11-13 DIAGNOSIS — Z90.89 ACQUIRED ABSENCE OF OTHER ORGANS: Chronic | ICD-10-CM

## 2023-11-13 DIAGNOSIS — N93.9 ABNORMAL UTERINE AND VAGINAL BLEEDING, UNSPECIFIED: ICD-10-CM

## 2023-11-13 DIAGNOSIS — R07.9 CHEST PAIN, UNSPECIFIED: ICD-10-CM

## 2023-11-13 DIAGNOSIS — Z30.8 ENCOUNTER FOR OTHER CONTRACEPTIVE MANAGEMENT: Chronic | ICD-10-CM

## 2023-11-13 DIAGNOSIS — R06.02 SHORTNESS OF BREATH: ICD-10-CM

## 2023-11-13 DIAGNOSIS — N83.299 OTHER OVARIAN CYST, UNSPECIFIED SIDE: ICD-10-CM

## 2023-11-13 LAB
ALBUMIN SERPL ELPH-MCNC: 4.2 G/DL — SIGNIFICANT CHANGE UP (ref 3.3–5.2)
ALBUMIN SERPL ELPH-MCNC: 4.2 G/DL — SIGNIFICANT CHANGE UP (ref 3.3–5.2)
ALP SERPL-CCNC: 109 U/L — SIGNIFICANT CHANGE UP (ref 40–120)
ALP SERPL-CCNC: 109 U/L — SIGNIFICANT CHANGE UP (ref 40–120)
ALT FLD-CCNC: 11 U/L — SIGNIFICANT CHANGE UP
ALT FLD-CCNC: 11 U/L — SIGNIFICANT CHANGE UP
ANION GAP SERPL CALC-SCNC: 12 MMOL/L — SIGNIFICANT CHANGE UP (ref 5–17)
ANION GAP SERPL CALC-SCNC: 12 MMOL/L — SIGNIFICANT CHANGE UP (ref 5–17)
APTT BLD: 30.9 SEC — SIGNIFICANT CHANGE UP (ref 24.5–35.6)
APTT BLD: 30.9 SEC — SIGNIFICANT CHANGE UP (ref 24.5–35.6)
AST SERPL-CCNC: 18 U/L — SIGNIFICANT CHANGE UP
AST SERPL-CCNC: 18 U/L — SIGNIFICANT CHANGE UP
BASOPHILS # BLD AUTO: 0.03 K/UL — SIGNIFICANT CHANGE UP (ref 0–0.2)
BASOPHILS # BLD AUTO: 0.03 K/UL — SIGNIFICANT CHANGE UP (ref 0–0.2)
BASOPHILS NFR BLD AUTO: 0.4 % — SIGNIFICANT CHANGE UP (ref 0–2)
BASOPHILS NFR BLD AUTO: 0.4 % — SIGNIFICANT CHANGE UP (ref 0–2)
BILIRUB SERPL-MCNC: <0.2 MG/DL — LOW (ref 0.4–2)
BILIRUB SERPL-MCNC: <0.2 MG/DL — LOW (ref 0.4–2)
BLD GP AB SCN SERPL QL: SIGNIFICANT CHANGE UP
BLD GP AB SCN SERPL QL: SIGNIFICANT CHANGE UP
BUN SERPL-MCNC: 9.7 MG/DL — SIGNIFICANT CHANGE UP (ref 8–20)
BUN SERPL-MCNC: 9.7 MG/DL — SIGNIFICANT CHANGE UP (ref 8–20)
CALCIUM SERPL-MCNC: 9.1 MG/DL — SIGNIFICANT CHANGE UP (ref 8.4–10.5)
CALCIUM SERPL-MCNC: 9.1 MG/DL — SIGNIFICANT CHANGE UP (ref 8.4–10.5)
CHLORIDE SERPL-SCNC: 102 MMOL/L — SIGNIFICANT CHANGE UP (ref 96–108)
CHLORIDE SERPL-SCNC: 102 MMOL/L — SIGNIFICANT CHANGE UP (ref 96–108)
CO2 SERPL-SCNC: 26 MMOL/L — SIGNIFICANT CHANGE UP (ref 22–29)
CO2 SERPL-SCNC: 26 MMOL/L — SIGNIFICANT CHANGE UP (ref 22–29)
CREAT SERPL-MCNC: 0.76 MG/DL — SIGNIFICANT CHANGE UP (ref 0.5–1.3)
CREAT SERPL-MCNC: 0.76 MG/DL — SIGNIFICANT CHANGE UP (ref 0.5–1.3)
EGFR: 95 ML/MIN/1.73M2 — SIGNIFICANT CHANGE UP
EGFR: 95 ML/MIN/1.73M2 — SIGNIFICANT CHANGE UP
EOSINOPHIL # BLD AUTO: 0.07 K/UL — SIGNIFICANT CHANGE UP (ref 0–0.5)
EOSINOPHIL # BLD AUTO: 0.07 K/UL — SIGNIFICANT CHANGE UP (ref 0–0.5)
EOSINOPHIL NFR BLD AUTO: 1 % — SIGNIFICANT CHANGE UP (ref 0–6)
EOSINOPHIL NFR BLD AUTO: 1 % — SIGNIFICANT CHANGE UP (ref 0–6)
GLUCOSE SERPL-MCNC: 69 MG/DL — LOW (ref 70–99)
GLUCOSE SERPL-MCNC: 69 MG/DL — LOW (ref 70–99)
HCG SERPL-ACNC: <4 MIU/ML — SIGNIFICANT CHANGE UP
HCG SERPL-ACNC: <4 MIU/ML — SIGNIFICANT CHANGE UP
HCT VFR BLD CALC: 33.1 % — LOW (ref 34.5–45)
HCT VFR BLD CALC: 33.1 % — LOW (ref 34.5–45)
HGB BLD-MCNC: 10.8 G/DL — LOW (ref 11.5–15.5)
HGB BLD-MCNC: 10.8 G/DL — LOW (ref 11.5–15.5)
IMM GRANULOCYTES NFR BLD AUTO: 0.1 % — SIGNIFICANT CHANGE UP (ref 0–0.9)
IMM GRANULOCYTES NFR BLD AUTO: 0.1 % — SIGNIFICANT CHANGE UP (ref 0–0.9)
INR BLD: 0.98 RATIO — SIGNIFICANT CHANGE UP (ref 0.85–1.18)
INR BLD: 0.98 RATIO — SIGNIFICANT CHANGE UP (ref 0.85–1.18)
LYMPHOCYTES # BLD AUTO: 2.89 K/UL — SIGNIFICANT CHANGE UP (ref 1–3.3)
LYMPHOCYTES # BLD AUTO: 2.89 K/UL — SIGNIFICANT CHANGE UP (ref 1–3.3)
LYMPHOCYTES # BLD AUTO: 39.6 % — SIGNIFICANT CHANGE UP (ref 13–44)
LYMPHOCYTES # BLD AUTO: 39.6 % — SIGNIFICANT CHANGE UP (ref 13–44)
MCHC RBC-ENTMCNC: 29.3 PG — SIGNIFICANT CHANGE UP (ref 27–34)
MCHC RBC-ENTMCNC: 29.3 PG — SIGNIFICANT CHANGE UP (ref 27–34)
MCHC RBC-ENTMCNC: 32.6 GM/DL — SIGNIFICANT CHANGE UP (ref 32–36)
MCHC RBC-ENTMCNC: 32.6 GM/DL — SIGNIFICANT CHANGE UP (ref 32–36)
MCV RBC AUTO: 89.9 FL — SIGNIFICANT CHANGE UP (ref 80–100)
MCV RBC AUTO: 89.9 FL — SIGNIFICANT CHANGE UP (ref 80–100)
MONOCYTES # BLD AUTO: 0.54 K/UL — SIGNIFICANT CHANGE UP (ref 0–0.9)
MONOCYTES # BLD AUTO: 0.54 K/UL — SIGNIFICANT CHANGE UP (ref 0–0.9)
MONOCYTES NFR BLD AUTO: 7.4 % — SIGNIFICANT CHANGE UP (ref 2–14)
MONOCYTES NFR BLD AUTO: 7.4 % — SIGNIFICANT CHANGE UP (ref 2–14)
NEUTROPHILS # BLD AUTO: 3.76 K/UL — SIGNIFICANT CHANGE UP (ref 1.8–7.4)
NEUTROPHILS # BLD AUTO: 3.76 K/UL — SIGNIFICANT CHANGE UP (ref 1.8–7.4)
NEUTROPHILS NFR BLD AUTO: 51.5 % — SIGNIFICANT CHANGE UP (ref 43–77)
NEUTROPHILS NFR BLD AUTO: 51.5 % — SIGNIFICANT CHANGE UP (ref 43–77)
PLATELET # BLD AUTO: 352 K/UL — SIGNIFICANT CHANGE UP (ref 150–400)
PLATELET # BLD AUTO: 352 K/UL — SIGNIFICANT CHANGE UP (ref 150–400)
POTASSIUM SERPL-MCNC: 3.7 MMOL/L — SIGNIFICANT CHANGE UP (ref 3.5–5.3)
POTASSIUM SERPL-MCNC: 3.7 MMOL/L — SIGNIFICANT CHANGE UP (ref 3.5–5.3)
POTASSIUM SERPL-SCNC: 3.7 MMOL/L — SIGNIFICANT CHANGE UP (ref 3.5–5.3)
POTASSIUM SERPL-SCNC: 3.7 MMOL/L — SIGNIFICANT CHANGE UP (ref 3.5–5.3)
PROT SERPL-MCNC: 6.9 G/DL — SIGNIFICANT CHANGE UP (ref 6.6–8.7)
PROT SERPL-MCNC: 6.9 G/DL — SIGNIFICANT CHANGE UP (ref 6.6–8.7)
PROTHROM AB SERPL-ACNC: 10.9 SEC — SIGNIFICANT CHANGE UP (ref 9.5–13)
PROTHROM AB SERPL-ACNC: 10.9 SEC — SIGNIFICANT CHANGE UP (ref 9.5–13)
RBC # BLD: 3.68 M/UL — LOW (ref 3.8–5.2)
RBC # BLD: 3.68 M/UL — LOW (ref 3.8–5.2)
RBC # FLD: 15.1 % — HIGH (ref 10.3–14.5)
RBC # FLD: 15.1 % — HIGH (ref 10.3–14.5)
SODIUM SERPL-SCNC: 140 MMOL/L — SIGNIFICANT CHANGE UP (ref 135–145)
SODIUM SERPL-SCNC: 140 MMOL/L — SIGNIFICANT CHANGE UP (ref 135–145)
T3 SERPL-MCNC: 116 NG/DL — SIGNIFICANT CHANGE UP (ref 80–200)
T3 SERPL-MCNC: 116 NG/DL — SIGNIFICANT CHANGE UP (ref 80–200)
T4 AB SER-ACNC: 7.5 UG/DL — SIGNIFICANT CHANGE UP (ref 4.5–12)
T4 AB SER-ACNC: 7.5 UG/DL — SIGNIFICANT CHANGE UP (ref 4.5–12)
TROPONIN T, HIGH SENSITIVITY RESULT: <6 NG/L — SIGNIFICANT CHANGE UP (ref 0–51)
TROPONIN T, HIGH SENSITIVITY RESULT: <6 NG/L — SIGNIFICANT CHANGE UP (ref 0–51)
TSH SERPL-MCNC: 1.12 UIU/ML — SIGNIFICANT CHANGE UP (ref 0.27–4.2)
TSH SERPL-MCNC: 1.12 UIU/ML — SIGNIFICANT CHANGE UP (ref 0.27–4.2)
WBC # BLD: 7.3 K/UL — SIGNIFICANT CHANGE UP (ref 3.8–10.5)
WBC # BLD: 7.3 K/UL — SIGNIFICANT CHANGE UP (ref 3.8–10.5)
WBC # FLD AUTO: 7.3 K/UL — SIGNIFICANT CHANGE UP (ref 3.8–10.5)
WBC # FLD AUTO: 7.3 K/UL — SIGNIFICANT CHANGE UP (ref 3.8–10.5)

## 2023-11-13 PROCEDURE — 99285 EMERGENCY DEPT VISIT HI MDM: CPT

## 2023-11-13 PROCEDURE — 93010 ELECTROCARDIOGRAM REPORT: CPT

## 2023-11-13 PROCEDURE — 76830 TRANSVAGINAL US NON-OB: CPT | Mod: 26

## 2023-11-13 PROCEDURE — 86901 BLOOD TYPING SEROLOGIC RH(D): CPT

## 2023-11-13 PROCEDURE — 85025 COMPLETE CBC W/AUTO DIFF WBC: CPT

## 2023-11-13 PROCEDURE — 36415 COLL VENOUS BLD VENIPUNCTURE: CPT

## 2023-11-13 PROCEDURE — 76856 US EXAM PELVIC COMPLETE: CPT

## 2023-11-13 PROCEDURE — 86900 BLOOD TYPING SEROLOGIC ABO: CPT

## 2023-11-13 PROCEDURE — 76856 US EXAM PELVIC COMPLETE: CPT | Mod: 26

## 2023-11-13 PROCEDURE — 84436 ASSAY OF TOTAL THYROXINE: CPT

## 2023-11-13 PROCEDURE — 93005 ELECTROCARDIOGRAM TRACING: CPT

## 2023-11-13 PROCEDURE — 76830 TRANSVAGINAL US NON-OB: CPT

## 2023-11-13 PROCEDURE — 84480 ASSAY TRIIODOTHYRONINE (T3): CPT

## 2023-11-13 PROCEDURE — 99214 OFFICE O/P EST MOD 30 MIN: CPT

## 2023-11-13 PROCEDURE — 99285 EMERGENCY DEPT VISIT HI MDM: CPT | Mod: 25

## 2023-11-13 PROCEDURE — 85610 PROTHROMBIN TIME: CPT

## 2023-11-13 PROCEDURE — 71045 X-RAY EXAM CHEST 1 VIEW: CPT | Mod: 26

## 2023-11-13 PROCEDURE — 71045 X-RAY EXAM CHEST 1 VIEW: CPT

## 2023-11-13 PROCEDURE — 85730 THROMBOPLASTIN TIME PARTIAL: CPT

## 2023-11-13 PROCEDURE — 86850 RBC ANTIBODY SCREEN: CPT

## 2023-11-13 PROCEDURE — 84702 CHORIONIC GONADOTROPIN TEST: CPT

## 2023-11-13 PROCEDURE — 84443 ASSAY THYROID STIM HORMONE: CPT

## 2023-11-13 PROCEDURE — 80053 COMPREHEN METABOLIC PANEL: CPT

## 2023-11-13 PROCEDURE — 84484 ASSAY OF TROPONIN QUANT: CPT

## 2023-11-13 RX ORDER — ALPRAZOLAM 0.25 MG
0.5 TABLET ORAL ONCE
Refills: 0 | Status: DISCONTINUED | OUTPATIENT
Start: 2023-11-13 | End: 2023-11-13

## 2023-11-13 RX ORDER — HYDROXYZINE HCL 10 MG
50 TABLET ORAL ONCE
Refills: 0 | Status: COMPLETED | OUTPATIENT
Start: 2023-11-13 | End: 2023-11-13

## 2023-11-13 RX ADMIN — Medication 50 MILLIGRAM(S): at 21:14

## 2023-11-13 RX ADMIN — Medication 0.5 MILLIGRAM(S): at 22:12

## 2023-11-13 NOTE — ED ADULT NURSE NOTE - NSFALLHARMRISKINTERV_ED_ALL_ED

## 2023-11-13 NOTE — ED PROVIDER NOTE - PATIENT PORTAL LINK FT
You can access the FollowMyHealth Patient Portal offered by [facility] by registering at the following website: [email or web address]. By joining [facility]’s FollowMyHealth portal, you will also be able to view your health information using other applications (apps) compatible with our system.

## 2023-11-13 NOTE — ED ADULT NURSE NOTE - CAS TRG GEN SKIN CONDITION
Unit 6D Observation [address] [address]    Phone:  [phone]    Fax:  [phone]                                       After Visit Summary   11/16/2017    Clarisa Bella    MRN: [id number]           After Visit Summary Signature Page     I have received my discharge instructions, and my questions have been answered. I have discussed any challenges I see with this plan with the nurse or doctor.    ..........................................................................................................................................  Patient/Patient Representative Signature      ..........................................................................................................................................  Patient Representative Print Name and Relationship to Patient    ..................................................               ................................................  Date                                            Time    ..........................................................................................................................................  Reviewed by Signature/Title    ...................................................              ..............................................  Date                                                            Time           Warm/Dry

## 2023-11-13 NOTE — ED ADULT TRIAGE NOTE - GLASGOW COMA SCALE: BEST VERBAL RESPONSE, MLM
ICU Vital Signs Last 24 Hrs  T(C): 36.7 (03 Apr 2023 20:13), Max: 37.3 (03 Apr 2023 10:17)  T(F): 98.1 (03 Apr 2023 20:13), Max: 99.1 (03 Apr 2023 10:17)  HR: 63 (03 Apr 2023 20:13) (63 - 91)  BP: 120/62 (03 Apr 2023 20:13) (120/62 - 171/90)  RR: 20 (03 Apr 2023 20:13) (16 - 20)  SpO2: 98% (03 Apr 2023 20:13) (98% - 100%)    O2 Parameters below as of 03 Apr 2023 20:13  Patient On (Oxygen Delivery Method): nasal cannula  O2 Flow (L/min): 3    General: Awake, cooperative with exam. No acute distress.   Skin: Warm, dry, and pink.   Eyes: Pupils equal and reactive to light. Extraocular eye movements intact. No conjunctival injection, discharge, or scleral icterus.   HEENT: Atraumatic, normocephalic. Moist mucus membranes.   Cardiology: Normal S1, S2. No murmurs, rubs, or gallops. Regular rate and rhythm.   Respiratory: Rhonchi throughout the lung fields mostly on the left. Normal chest expansion.   Gastrointestinal: Positive bowel sounds. Soft, non-tender, non-distended. No guarding, rigidity, or rebound tenderness. No hepatosplenomegaly. Hemorrhoids on rectal exam (chaperone present), occult negative.   Musculoskeletal: 5/5 motor strength in all extremities. Normal range of motion.   Extremities: No peripheral edema bilaterally. Dorsalis pedis pulses 2+ bilaterally. AV fistula left arm.   Neurological: A+Ox2 (person, place). Cranial nerves 2-12 intact. Normal speech. No facial droop. No focal neurological deficits. 5/5 motor strength in all extremities.  Psychiatric: Normal affect. Normal mood.
(V5) oriented

## 2023-11-13 NOTE — ED PROVIDER NOTE - CLINICAL SUMMARY MEDICAL DECISION MAKING FREE TEXT BOX
48-year-old female history of NSTEMI in 2022 status post LAD stent, recent work-up for right dysfunctional uterine bleeding with finding of 2 cysts though no comment on endometrial lining, presenting for evaluation of both chest pain as well as vaginal bleeding.  On examination blood pressure is soft, heart rate is normal, O2 saturation within normal limits, abdominal exam benign, lungs clear to auscultation bilaterally.  Will check labs including coagulation studies, troponin, obtain transvaginal sonogram, follow-up studies reassess dispo pending clinical course and results.  Possibly obtain OB/GYN and cardiology consultations given complaints.

## 2023-11-13 NOTE — ED PROVIDER NOTE - PROGRESS NOTE DETAILS
Pt feels very anxious, states she usually takes atarax at home for anxiety. Will give while pt awaiting GYN evaluation. Colby Thomas MD Spoke with GYN team. Pt cleared from GYN standpoint. Needs cardiac clearance for endometrial biopsy under anesthesiology on outpatient basis. Pt and family at bedside in agreement, will arrange for outpt appointment. Phoenix Thomas MD

## 2023-11-13 NOTE — ED PROVIDER NOTE - CARE PLAN
1 Principal Discharge DX:	Uterine bleeding   Principal Discharge DX:	Chest pain  Secondary Diagnosis:	Uterine bleeding

## 2023-11-13 NOTE — ED ADULT NURSE NOTE - NSFALLCONCLUSION_ED_ALL_ED
Continue furosemide 40 mg daily and spironolactone 25mg daily.  Encourage low sodium, high protein diet.  Continue with famotidine for symptomatic relief of heartburn.  Repeat upper endoscopy for evaluation in January 2026.  Recommend right upper quadrant ultrasound for surveillance against hepatocellular carcinoma in September 2023.  
Fall with Harm Risk

## 2023-11-13 NOTE — ED PROVIDER NOTE - OBJECTIVE STATEMENT
48-year-old female history of MI in 2022 status post 1 stent to the LAD presenting for evaluation of dysfunctional uterine bleeding for the past 3 weeks as well as chest pain that has been present for approximately a week and a half.  Patient states she was seen in [place] yesterday for the same complaint,  however she notes she did not have a work-up with a troponin level while in [place] and that is how they picked up her heart attack when she had it in 2022.  Subsequently she called her cardiologist office today as well as her OB/GYN clinic today, was referred to the ED for further evaluation.  Denies any recent fevers, chills, nausea, vomiting, diarrhea, constipation, sore throat, runny nose.  Is not on any blood thinners.  Does have a history of anxiety and feels like she is very nervous, also has a history of neurogenic pain and states she ran out of her medication for this a few weeks ago.

## 2023-11-13 NOTE — ED ADULT NURSE NOTE - OBJECTIVE STATEMENT
Pt presents A&O x4 c/o vaginal bleeding x3 weeks and palpitations chest pain x1 week. Pt reports vaginal bleeding as intermittently heavy with clots. Pt reports intermittent dizziness. Chest pain aggravated with going up stairs. Palpitations noted intermittently when laying down or moving around. Pt reports hx of MI, panic disorder, and HTN. Pt denies N/V. RR even and unlabored. Skin is warm, dry and appropriate for ethnicity. Cardiac and  monitoring in place. Able to make needs known.

## 2023-11-14 VITALS
SYSTOLIC BLOOD PRESSURE: 100 MMHG | TEMPERATURE: 98 F | HEART RATE: 66 BPM | RESPIRATION RATE: 18 BRPM | OXYGEN SATURATION: 97 % | DIASTOLIC BLOOD PRESSURE: 65 MMHG

## 2023-11-15 RX ORDER — MEDROXYPROGESTERONE ACETATE 5 MG/1
5 TABLET ORAL DAILY
Qty: 10 | Refills: 0 | Status: ACTIVE | COMMUNITY
Start: 2023-11-15 | End: 1900-01-01

## 2023-11-19 NOTE — ED ADULT NURSE NOTE - CAS DISCH ACCOMP BY
GENERAL: No fever, no chills  	EYES: No change in vision  	HEENT: No trouble swallowing or speaking  	CARDIAC: No chest pain  	PULMONARY: No cough, no SOB  	GI: No abdominal pain, no nausea, no vomiting, no diarrhea, no constipation  	: No changes in urination  	SKIN: No rashes  	NEURO: No headache, no numbness  	MSK: No visible bony deformity   Otherwise as HPI or negative.
Family

## 2023-11-20 RX ORDER — MEDROXYPROGESTERONE ACETATE 10 MG/1
10 TABLET ORAL
Qty: 14 | Refills: 0 | Status: ACTIVE | COMMUNITY
Start: 2023-11-20 | End: 1900-01-01

## 2023-11-27 ENCOUNTER — NON-APPOINTMENT (OUTPATIENT)
Age: 48
End: 2023-11-27

## 2023-11-27 ENCOUNTER — APPOINTMENT (OUTPATIENT)
Dept: CARDIOLOGY | Facility: CLINIC | Age: 48
End: 2023-11-27
Payer: MEDICAID

## 2023-11-27 VITALS
OXYGEN SATURATION: 99 % | HEIGHT: 63 IN | SYSTOLIC BLOOD PRESSURE: 116 MMHG | HEART RATE: 87 BPM | WEIGHT: 164 LBS | BODY MASS INDEX: 29.06 KG/M2 | DIASTOLIC BLOOD PRESSURE: 78 MMHG

## 2023-11-27 DIAGNOSIS — I25.10 ATHEROSCLEROTIC HEART DISEASE OF NATIVE CORONARY ARTERY W/OUT ANGINA PECTORIS: ICD-10-CM

## 2023-11-27 DIAGNOSIS — Z01.810 ENCOUNTER FOR PREPROCEDURAL CARDIOVASCULAR EXAMINATION: ICD-10-CM

## 2023-11-27 PROCEDURE — 93000 ELECTROCARDIOGRAM COMPLETE: CPT

## 2023-11-27 PROCEDURE — 99215 OFFICE O/P EST HI 40 MIN: CPT | Mod: 25

## 2023-11-27 RX ORDER — SERTRALINE HYDROCHLORIDE 50 MG/1
50 TABLET, FILM COATED ORAL
Qty: 30 | Refills: 0 | Status: DISCONTINUED | COMMUNITY
Start: 2022-08-01 | End: 2023-11-27

## 2023-12-04 ENCOUNTER — APPOINTMENT (OUTPATIENT)
Dept: OBGYN | Facility: CLINIC | Age: 48
End: 2023-12-04
Payer: MEDICAID

## 2023-12-04 VITALS
HEIGHT: 63 IN | DIASTOLIC BLOOD PRESSURE: 82 MMHG | WEIGHT: 166 LBS | BODY MASS INDEX: 29.41 KG/M2 | SYSTOLIC BLOOD PRESSURE: 134 MMHG

## 2023-12-04 PROCEDURE — 58558Z: CUSTOM

## 2023-12-04 PROCEDURE — 81025 URINE PREGNANCY TEST: CPT

## 2023-12-04 RX ORDER — CYCLOBENZAPRINE HYDROCHLORIDE 5 MG/1
5 TABLET, FILM COATED ORAL
Qty: 6 | Refills: 0 | Status: ACTIVE | COMMUNITY
Start: 2023-12-04 | End: 1900-01-01

## 2023-12-06 LAB
HCG UR QL: NEGATIVE
QUALITY CONTROL: YES

## 2023-12-15 LAB — CORE LAB BIOPSY: NORMAL

## 2023-12-22 ENCOUNTER — APPOINTMENT (OUTPATIENT)
Dept: CARDIOLOGY | Facility: CLINIC | Age: 48
End: 2023-12-22

## 2024-01-04 ENCOUNTER — APPOINTMENT (OUTPATIENT)
Dept: OBGYN | Facility: CLINIC | Age: 49
End: 2024-01-04
Payer: MEDICAID

## 2024-01-04 VITALS
HEIGHT: 63 IN | WEIGHT: 159.3 LBS | BODY MASS INDEX: 28.23 KG/M2 | SYSTOLIC BLOOD PRESSURE: 120 MMHG | DIASTOLIC BLOOD PRESSURE: 70 MMHG

## 2024-01-04 PROCEDURE — 99214 OFFICE O/P EST MOD 30 MIN: CPT

## 2024-01-04 NOTE — COUNSELING
[Nutrition/ Exercise/ Weight Management] : nutrition, exercise, weight management [Body Image] : body image [Vitamins/Supplements] : vitamins/supplements [Smoking Cessation] : smoking cessation [Drugs/Alcohol] : drugs, alcohol [Breast Self Exam] : breast self exam [Bladder Hygiene] : bladder hygiene [Confidentiality] : confidentiality [Lab Results] : lab results [Medication Management] : medication management

## 2024-01-04 NOTE — HISTORY OF PRESENT ILLNESS
[FreeTextEntry1] : 48 y.o  (LMP 10/2023) presenting for follow up after EMB.  Overall feels well. No further vaginal spotting since last visit. Reports intermittent pelvic discomfort. Tolerable with OTC pain medications. Of note, states that she had Essure devices placed several years ago and would like it removed. No further complaints

## 2024-01-04 NOTE — DISCUSSION/SUMMARY
[FreeTextEntry1] : 48 y.o  (LMP 10/2023) presenting for follow up after EMB.  Plan: - EMB discussed. Final pathology:  Fragments of inactive endometrium with extensive endometrial stromal breakdown. - Counseled patient regarding essure device and methods forremoval. Voiced desire for total hysterectomy if surgical removal is only option. - R/B/A discussed. RTO 1 month for pap/annual. Will discuss surgical options/management at that time.

## 2024-01-18 ENCOUNTER — APPOINTMENT (OUTPATIENT)
Dept: CARDIOLOGY | Facility: CLINIC | Age: 49
End: 2024-01-18

## 2024-01-29 NOTE — ED CDU PROVIDER SUBSEQUENT DAY NOTE - CROS ED CARDIOVAS ALL NEG
Patient attended the cognitive behavior therapy group session. Group focused on the topic of core beliefs including how beliefs develop over a lifetime, its helpfulness in regulating our behaviors, as well as how rigid and maladaptive they can be.  Group expectations and guidelines (as well as confidentiality and its limitations) were addressed. Principles of cognitive behavior therapy related to today's group topic were reviewed.  Group members illustrated understanding of these concepts by giving personal examples based on their current experiences. Discussions stemmed from the group topics to the causal connection between thoughts, feelings, and behaviors. Patient attended the cognitive behavior therapy (CBT) group session.  Group focused on the topic of motivation and learning how to develop the commitment to change their current circumstances.  Patient was encouraged to list difficulties as well as their strengths in dealing with their problems.  Patient was also encouraged to do a cost benefit analysis of changing current situations.  Group expectations and guidelines (as well as confidentiality and its limitations) were addressed.  Principles of cognitive behavior therapy related to today's group topic were reviewed.  Group members illustrated understanding of these concepts by giving personal examples based on their current experiences.  Discussions stemmed from the group topics to the causal connection between thoughts, feelings, and behaviors. Patient attended the cognitive behavior therapy group session. Group session focused on the stages of change.  Discussion revolved around learning how to navigate the stages of Precontemplation, Contemplation, Preparation, Action, and Maintenance. Techniques focusing on learning about the different aspects of each stage.  Strategies for change as well as reviewing the possibilities of relapse were also discussed.  Group expectations and guidelines, as well as confidentiality and its limitations, were addressed. Principles of cognitive behavior therapy related to today's group topic were reviewed and discussed. Group members illustrated understanding of these concepts by giving personal examples based on their current experiences. Discussions stemmed from the group topics to the causal connection between thoughts, feelings, and behaviors. negative... Patient attended the cognitive behavior therapy group session. Group focused on the topic of procrastination, including understanding the lethargy cycle, identifying mind-sets associated with procrastination, as well as learning self-activation techniques.  Group expectations and guidelines as well as confidentiality and its limitations were addressed. Principles of cognitive behavior therapy related to today's group topic were reviewed.  Group members illustrated understanding of these concepts by giving personal examples based on their current experiences. Discussions stemmed from the group topics to the causal connection between thoughts, feelings, and behaviors.

## 2024-02-07 ENCOUNTER — APPOINTMENT (OUTPATIENT)
Dept: CARDIOLOGY | Facility: CLINIC | Age: 49
End: 2024-02-07

## 2024-02-29 ENCOUNTER — APPOINTMENT (OUTPATIENT)
Dept: OBGYN | Facility: CLINIC | Age: 49
End: 2024-02-29

## 2024-03-26 ENCOUNTER — APPOINTMENT (OUTPATIENT)
Dept: ORTHOPEDIC SURGERY | Facility: CLINIC | Age: 49
End: 2024-03-26
Payer: MEDICAID

## 2024-03-26 VITALS
DIASTOLIC BLOOD PRESSURE: 77 MMHG | SYSTOLIC BLOOD PRESSURE: 117 MMHG | BODY MASS INDEX: 28.88 KG/M2 | HEIGHT: 63 IN | HEART RATE: 73 BPM | WEIGHT: 163 LBS

## 2024-03-26 DIAGNOSIS — M54.2 CERVICALGIA: ICD-10-CM

## 2024-03-26 DIAGNOSIS — M54.50 LOW BACK PAIN, UNSPECIFIED: ICD-10-CM

## 2024-03-26 DIAGNOSIS — Z86.59 PERSONAL HISTORY OF OTHER MENTAL AND BEHAVIORAL DISORDERS: ICD-10-CM

## 2024-03-26 PROCEDURE — 72040 X-RAY EXAM NECK SPINE 2-3 VW: CPT

## 2024-03-26 PROCEDURE — 99204 OFFICE O/P NEW MOD 45 MIN: CPT

## 2024-03-26 RX ORDER — SERTRALINE HYDROCHLORIDE 50 MG/1
50 TABLET, FILM COATED ORAL
Refills: 0 | Status: ACTIVE | COMMUNITY

## 2024-03-26 NOTE — DISCUSSION/SUMMARY
[de-identified] : Based upon cervical thoracic lumbar pain history of fibromyalgia history of lupus history of bipolar disorder history of cardiac disorder multiple or multifactorial management is going to be initiated physical therapy anti-inflammatories have been prescribed by myself home exercises topical modalities aerobic conditioning additional treatment such as acupuncture etc. have all been recommended patient definitely needs to be followed up by her primary care physician for fibromyalgia management bipolar management cardiomyopathy management and reinitiation of her lupus patient is can to follow-up in approximately 6 weeks at that point in time if signs are still persistent which they probably will be to some degree we are going to initiate MRI imaging etc.   Patient with multiple medical comorbidity increasing the risk of perioperative and postoperative complications as well as diminished spine outcomes as per the current medical literature. These include but are not limited to obesity, anxiety/depression, cardiac illness, kidney disease, peripheral vascular disease, history of cancer, COPD, dysmetabolic syndrome including but not limited to diabetes, hyperlipidemia, hypertension. Patient is being referred back to primary care provider for medical optimization, as well as other appropriate specialists as needed for optimization prior to spine surgery.

## 2024-03-26 NOTE — PHYSICAL EXAM
[Antalgic] : antalgic [de-identified] : CONSTITUTIONAL: The patient is a very pleasant individual who is well-nourished and who appears stated age. PSYCHIATRIC: The patient is alert and oriented X 3 and in no apparent distress, and participates with orthopedic evaluation well. HEAD: Atraumatic and is nonsyndromic in appearance. EENT: No visible thyromegaly, EOMI. RESPIRATORY: Respiratory rate is regular, not dyspneic on examination. LYMPHATICS: There is no inguinal lymphadenopathy INTEGUMENTARY: Skin is clean, dry, and intact about the bilateral lower extremities and lumbar spine. VASCULAR: There is brisk capillary refill about the bilateral lower extremities. NEUROLOGIC: There are no pathologic reflexes. There is no decrease in sensation of the bilateral lower extremities on manual  examination. Deep tendon reflexes are well maintained at 2+/4 of the bilateral lower extremities and are symmetric.. MUSCULOSKELETAL: There is no visible muscular atrophy. Manual motor strength is well maintained in the bilateral lower extremities. Range of motion of lumbar spine is well maintained. The patient ambulates in a non-myelopathic manner. Negative tension sign and straight leg raise bilaterally. Quad extension, ankle dorsiflexion, EHL, plantar flexion, and ankle eversion are well preserved. Normal secondary orthopaedic exam of bilateral hips, greater trochanteric area, knees and ankles,  mechanically orientated neck and low back pain consistent with a combination of fibromyalgia and mechanically orientated back pain. [de-identified] : X-ray of the lumbar spine from [facility] radiology March 4, 2024 demonstrates well-maintained lumbar lordosis to spaces are maintained.  Overall no acute osseous abnormalities.  AP x-rays been reviewed showing well-maintained pelvic structures as well as well-maintained hip joints with no significant hip degenerative joint disease.  Bilateral shoulder x-rays have been reviewed no acute osseous abnormalities well-maintained joint spaces also from [facility]  DEXA scan has been reviewed showing normal findings  2 views of the cervical spine taken on today's date of March 26, 2024 demonstrates well-maintained cervical alignment there is cervical degenerative disc disease primarily at 5 6 to a lesser degree 6 7 anterior osteophytes noted at 5 6 and 4 5

## 2024-03-26 NOTE — HISTORY OF PRESENT ILLNESS
[de-identified] :  Ronit is a very pleasant 49-year-old female who has been well-known to the practice for approximately a decade.  She has longstanding history of cervical thoracic lumbar pain diagnosed with fibromyalgia and she has a history of very dense bipolar disorder.  She also has a history of lupus currently the lupus has been untreated as well as the fibromyalgia her DEBBI questionnaire is negative she has had no recent treatment she has noticed worsening cervical thoracic and lumbar pain recently.  No components of radiculopathy just more of a pain complaint.   Currently on meloxicam [Worsening] : worsening [___ yrs] : [unfilled] year(s) ago [1] : a current pain level of 1/10 [10] : a maximum pain level of 10/10 [Constant] : ~He/She~ states the symptoms seem to be constant [Bending] : worsened by bending [Lifting] : worsened by lifting [NSAIDs] : relieved by nonsteroidal anti-inflammatory drugs [Rest] : relieved by rest [Incontinence] : no incontinence [Loss of Dexterity] : good dexterity [Urinary Ret.] : no urinary retention

## 2024-04-02 ENCOUNTER — APPOINTMENT (OUTPATIENT)
Dept: CARDIOLOGY | Facility: CLINIC | Age: 49
End: 2024-04-02

## 2024-04-10 ENCOUNTER — APPOINTMENT (OUTPATIENT)
Dept: OBGYN | Facility: CLINIC | Age: 49
End: 2024-04-10

## 2024-04-10 ENCOUNTER — APPOINTMENT (OUTPATIENT)
Dept: CARDIOLOGY | Facility: CLINIC | Age: 49
End: 2024-04-10
Payer: MEDICAID

## 2024-04-10 VITALS
OXYGEN SATURATION: 100 % | HEIGHT: 63 IN | WEIGHT: 165 LBS | HEART RATE: 70 BPM | TEMPERATURE: 98.6 F | DIASTOLIC BLOOD PRESSURE: 71 MMHG | BODY MASS INDEX: 29.23 KG/M2 | SYSTOLIC BLOOD PRESSURE: 104 MMHG

## 2024-04-10 DIAGNOSIS — E78.2 MIXED HYPERLIPIDEMIA: ICD-10-CM

## 2024-04-10 DIAGNOSIS — R00.2 PALPITATIONS: ICD-10-CM

## 2024-04-10 DIAGNOSIS — R09.89 OTHER SPECIFIED SYMPTOMS AND SIGNS INVOLVING THE CIRCULATORY AND RESPIRATORY SYSTEMS: ICD-10-CM

## 2024-04-10 PROCEDURE — 93000 ELECTROCARDIOGRAM COMPLETE: CPT

## 2024-04-10 PROCEDURE — 99215 OFFICE O/P EST HI 40 MIN: CPT | Mod: 25

## 2024-04-10 RX ORDER — CARIPRAZINE 3 MG/1
3 CAPSULE, GELATIN COATED ORAL
Refills: 0 | Status: DISCONTINUED | COMMUNITY
End: 2024-04-10

## 2024-04-10 RX ORDER — CARIPRAZINE 1.5 MG/1
1.5 CAPSULE, GELATIN COATED ORAL
Refills: 0 | Status: ACTIVE | COMMUNITY

## 2024-04-10 NOTE — HISTORY OF PRESENT ILLNESS
[FreeTextEntry1] : pt is seen today with an episode of syncope, CAD, HLD and prior MI  last Tuesday did not sleep well, she took more trazadone than usual.  She felt tired and went to go to bed. She fell and passed out. She also vomited and was nauseous.  2 days later she went to [facility], she had ct scan of head.  she has a lot of stress in family.   history of bipolar disorder, Covid 19 infection - 9/17/2021, went to [facility] for CP and diagnosed of MI s/p PCI to LAD, also found with LV apical thrombus - given heparin. She signed out from [facility] on 9/25/2021 and went to [facility] due to chest pain. LHC on 10/5/2021 revealed patent LAD stent, other coronaries: LM - normal, LCx - normal, RCA - mild atherosclerosis with no flow limiting lesions. Request for limited Echo to evaluate resolution of apical thrombus was denied according to patient.  No cp or sob.  EKG: NSR, old AS MI, no st/t changes. TTE from 4/4/22 : Normal LVEF, 60-65%, no significant VHD  11/10/2022 Returns for office visit. Has been having left sided chest discomfort when stressed and at times when using staircase; resolves after 1-2 minutes with rest. Also has been having palpitations with increased stress level. She has the monitor but did not wear yet. Denies syncopal episode since last visit. She will see her Psychiatrist and therapist ([facility]) later this month.  8/9/2023 returns for follow up . she still c/o of morning retrosternal chest pressure radiating to both shoulders and back , improves by relaxing and getting some air, It lasts for 5 minutes .  occasionally exertional as well .  she continues to smoke and is tyring to quit .   4/10/2024 Returns for follow up . still smokes and uses marijuana ( indiga) to help sleep, uses trazodone .  has a lot of body pain and doesn't sleep pain . She has many X-ray taken for falling twice ( coming down the stairs)

## 2024-04-10 NOTE — ASSESSMENT
[FreeTextEntry1] : 48 y/o F, with history of CAD/MI s/p PCI to [facility] - 9/2021 at [facility], HLD and bipolar disorder  A/P:  1.  CAD/MI s/p PCI to LAD - 9/2021 - stable , no chest pain, body pain from falling , didn't do the stress test and doesn't want to have an exercise stress test.  2- hyperlipidemia on atorvastatin  3- Active smoker discussed about stopping smoking  4- right carotid bruit, will order carotid U/S .

## 2024-04-10 NOTE — REVIEW OF SYSTEMS
[Chest Discomfort] : chest discomfort [Palpitations] : palpitations [SOB] : no shortness of breath [Lower Ext Edema] : no extremity edema [Syncope] : no syncope

## 2024-04-10 NOTE — PHYSICAL EXAM
[Well Developed] : well developed [Well Nourished] : well nourished [No Acute Distress] : no acute distress [Normal Venous Pressure] : normal venous pressure [Normal S1, S2] : normal S1, S2 [No Murmur] : no murmur [Clear Lung Fields] : clear lung fields [Good Air Entry] : good air entry [No Edema] : no edema [Moves all extremities] : moves all extremities [Alert and Oriented] : alert and oriented [de-identified] : right carotid bruit

## 2024-04-11 ENCOUNTER — NON-APPOINTMENT (OUTPATIENT)
Age: 49
End: 2024-04-11

## 2024-04-25 ENCOUNTER — APPOINTMENT (OUTPATIENT)
Dept: OBGYN | Facility: CLINIC | Age: 49
End: 2024-04-25

## 2024-04-30 ENCOUNTER — APPOINTMENT (OUTPATIENT)
Dept: CARDIOLOGY | Facility: CLINIC | Age: 49
End: 2024-04-30

## 2024-05-07 ENCOUNTER — APPOINTMENT (OUTPATIENT)
Dept: ORTHOPEDIC SURGERY | Facility: CLINIC | Age: 49
End: 2024-05-07

## 2024-05-28 NOTE — ASU PATIENT PROFILE, ADULT - FALL HARM RISK - FALLEN IN PAST
Patient previously declined genetic amniocentesis/invasive diagnostic testing of both twin sacs.     Patient previously declined invasive prenatal diagnostic testing today (including for evaluation and testing for fetal aneuploidy, fetal  infections, fetal cystic fibrosis mutation status, fetal single gene disorders, fetal microarray testing, etc).     Please refer to non-invasive prenatal testing/NIPT results (via [facility]).     Patient previously opted for maternal carrier testing ([facility]'s Horizon Carrier Screen) - not completed.      Options with respect to re-offering the patient invasive genetic prenatal testing, maternal carrier genetic testing, and evaluation for various  infections could not be completed today, as the patient declines a Maternal-Fetal Medicine physician consultation today.     Patient declines a Maternal-Fetal Medicine complete physician consultation today regarding the fetal and/or maternal medical/obstetrical complications/co-morbidities of pregnancy (specificallly for dilated loops of fetal bowel for both fetuses).      Maternal-Fetal Medicine (MFM) attending physician will defer all management for these medical/obstetrical complications of pregnancy to the primary attending obstetrical physician/provider, as a result.  Therefore, only an ultrasound evaluation was completed today in the MFM office.     Accidental fall

## 2024-06-17 NOTE — ED PROVIDER NOTE - WR ORDER STATUS 1
"[facility]    Patient Name: Марина Tilley  :1979  44 y.o.  LOS: 9  Encounter Provider: MARY Hauser      Patient Care Team:  Provider, No Known as PCP - General    Chief Complaint: Follow-up endocarditis    Interval History: Midsternal incision is healing well.  Denies dyspnea or chest pain.  Hopes to transition to SNF today       Objective   Vital Signs  Temp:  [97.8 °F (36.6 °C)-99.7 °F (37.6 °C)] 98.8 °F (37.1 °C)  Heart Rate:  [73-82] 73  Resp:  [18] 18  BP: (106-126)/(69-82) 126/82    Intake/Output Summary (Last 24 hours) at 2024 0934  Last data filed at 2024 0207  Gross per 24 hour   Intake 1060 ml   Output 2200 ml   Net -1140 ml     Flowsheet Rows      Flowsheet Row First Filed Value   Admission Height 167.6 cm (66\") Documented at 06/15/2024 1305   Admission Weight 92.6 kg (204 lb 1.6 oz) Documented at 2024 0600              Vitals and nursing note reviewed.   Constitutional:       Appearance: Normal appearance. Well-developed.   Eyes:      Conjunctiva/sclera: Conjunctivae normal.   Neck:      Vascular: No carotid bruit.   Pulmonary:      Breath sounds: Normal breath sounds.   Cardiovascular:      Normal rate. Regular rhythm. Normal S1 with normal intensity. Normal S2 with normal intensity.       Murmurs: There is no murmur.      No gallop.  No click. No rub.      Comments: Midsternal incision is well approximated without signs of wound dehiscence, erythema, soft tissue swelling, drainage.    Edema:     Peripheral edema absent.   Musculoskeletal: Normal range of motion. Skin:     General: Skin is warm and dry.   Neurological:      Mental Status: Alert and oriented to person, place, and time.      GCS: GCS eye subscore is 4. GCS verbal subscore is 5. GCS motor subscore is 6.   Psychiatric:         Speech: Speech normal.         Behavior: Behavior normal.         Thought Content: Thought content normal.         Judgment: Judgment normal.           Pertinent " Test Results:  Results from last 7 days   Lab Units 06/17/24  0729 06/16/24  1337 06/16/24  0315 06/15/24  0312 06/14/24  1813 06/14/24  0551 06/13/24  0257 06/12/24  1736 06/12/24  1333   SODIUM mmol/L 135*  --  135* 136  --  135* 143 139 141   POTASSIUM mmol/L 4.1 4.6 3.4* 4.1 4.2 3.6 4.2 4.0 4.0   CHLORIDE mmol/L 100  --  95* 98  --  96* 107 103 101   CO2 mmol/L 29.0  --  29.0 30.0*  --  28.2 27.9 26.7 25.4   BUN mg/dL 18  --  17 22*  --  16 20 23* 23*   CREATININE mg/dL 0.98  --  0.89 1.06*  --  0.98 1.09* 1.27* 1.33*   GLUCOSE mg/dL 104*  --  102* 133*  --  125* 120* 129* 193*   CALCIUM mg/dL 8.4*  --  8.2* 8.3*  --  8.8 9.2 9.3 8.5*         Results from last 7 days   Lab Units 06/16/24  0315 06/15/24  0312 06/14/24  0551 06/13/24  0257 06/12/24  2334 06/12/24  1736 06/12/24  1333 06/12/24  1144 06/12/24  1143   WBC 10*3/mm3 7.89 9.02 13.38* 13.04*  --  15.34* 21.31*  --  27.88*   HEMOGLOBIN g/dL 8.4* 8.8* 8.9* 8.5* 8.6* 8.0* 7.7*  --  7.2*   HEMOGLOBIN, POC   --   --   --   --   --   --   --    < >  --    HEMATOCRIT % 26.1* 27.7* 27.8* 26.1* 25.7* 23.9* 24.5*  --  22.4*   HEMATOCRIT POC   --   --   --   --   --   --   --    < >  --    PLATELETS 10*3/mm3 130* 122* 134* 126*  --  127* 154  --  151    < > = values in this interval not displayed.     Results from last 7 days   Lab Units 06/13/24  0257 06/12/24  1333 06/12/24  1146 06/12/24  1143 06/10/24  1828   INR  1.14* 1.38* 1.8* 1.82* 1.23*   APTT seconds  --  29.8  --  28.7 24.5     Results from last 7 days   Lab Units 06/16/24  0315 06/12/24  1736 06/12/24  1333 06/12/24  0509 06/11/24  0427 06/10/24  1828   MAGNESIUM mg/dL 1.3* 1.9 1.6 1.8 1.7 1.5*     Results from last 7 days   Lab Units 06/10/24  1828   CHOLESTEROL mg/dL 97   TRIGLYCERIDES mg/dL 163*   HDL CHOL mg/dL 21*                   Medication Review:   aspirin, 81 mg, Oral, Daily  busPIRone, 5 mg, Oral, TID  furosemide, 20 mg, Oral, BID  gabapentin, 400 mg, Oral, Q8H  guaiFENesin, 1,200 mg, Oral,  Q12H  magnesium oxide, 400 mg, Oral, Daily  metoprolol tartrate, 25 mg, Oral, Q12H  mupirocin, , Each Nare, BID  nicotine, 1 patch, Transdermal, Q24H  pantoprazole, 40 mg, Oral, QAM  senna-docusate sodium, 2 tablet, Oral, Nightly  sertraline, 25 mg, Oral, Daily  sodium chloride, 10 mL, Intravenous, Q12H  vancomycin, 1,750 mg, Intravenous, Q24H         Pharmacy to dose vancomycin,         Assessment & Plan     Active Hospital Problems    Diagnosis  POA    **Acute bacterial endocarditis [I33.0]  Yes      Resolved Hospital Problems   No resolved problems to display.        Bacterial endocarditis status post tooth extraction  On vancomycin per infectious disease team  Status post aortic valve replacement with 23 mm magna pericardial prosthesis 6/12/2024  Status post mitral valve annuloplasty with 30 mm Medtronic flexible band  Tricuspid valve repair with partial reconstruction including a septal leaflet with moving pericardium and 30 mm physio tricuspid ring followed by tricuspid valve replacement with a 31 mm Ramirez 2 porcine prosthesis  PFO/ASD closed  [place] closure  History of polysubstance abuse  Avoid narcotics, continue gabapentin and Ultram  Tobacco abuse  During this admission nursing staff has removed a vaping device that was brought to her by her family  Small to moderate pleural effusion with vascular congestion on CXR.  Continue oral Diuretics  Euvolemic on exam  Hypokalemia, and electrolyte replacement protocol    Okay to discharge from cardiovascular standpoint.  Follow-up with me in clinic in 2 months.  Patient will be in rehab for approximately 6 weeks to receive IV antibiotics           MARY Hauser  [facility]   [facility]  [address] [place] [address] - [address]  [place] [address]  Office: [phone]    06/17/24  09:34 EDT           Performed

## 2024-06-19 ENCOUNTER — APPOINTMENT (OUTPATIENT)
Dept: ORTHOPEDIC SURGERY | Facility: CLINIC | Age: 49
End: 2024-06-19

## 2024-07-13 ENCOUNTER — INPATIENT (INPATIENT)
Facility: HOSPITAL | Age: 49
LOS: 0 days | Discharge: HOME | DRG: 761 | End: 2024-07-14
Attending: STUDENT IN AN ORGANIZED HEALTH CARE EDUCATION/TRAINING PROGRAM | Admitting: STUDENT IN AN ORGANIZED HEALTH CARE EDUCATION/TRAINING PROGRAM
Payer: MEDICAID

## 2024-07-13 ENCOUNTER — TRANSCRIPTION ENCOUNTER (OUTPATIENT)
Age: 49
End: 2024-07-13

## 2024-07-13 VITALS
OXYGEN SATURATION: 99 % | TEMPERATURE: 98 F | SYSTOLIC BLOOD PRESSURE: 122 MMHG | RESPIRATION RATE: 17 BRPM | DIASTOLIC BLOOD PRESSURE: 79 MMHG | HEART RATE: 84 BPM

## 2024-07-13 DIAGNOSIS — Z90.89 ACQUIRED ABSENCE OF OTHER ORGANS: Chronic | ICD-10-CM

## 2024-07-13 DIAGNOSIS — Z90.721 ACQUIRED ABSENCE OF OVARIES, UNILATERAL: Chronic | ICD-10-CM

## 2024-07-13 DIAGNOSIS — S31.41XA LACERATION WITHOUT FOREIGN BODY OF VAGINA AND VULVA, INITIAL ENCOUNTER: ICD-10-CM

## 2024-07-13 DIAGNOSIS — Z30.8 ENCOUNTER FOR OTHER CONTRACEPTIVE MANAGEMENT: Chronic | ICD-10-CM

## 2024-07-13 LAB
ALBUMIN SERPL ELPH-MCNC: 4.2 G/DL — SIGNIFICANT CHANGE UP (ref 3.3–5.2)
ALP SERPL-CCNC: 112 U/L — SIGNIFICANT CHANGE UP (ref 40–120)
ALT FLD-CCNC: 13 U/L — SIGNIFICANT CHANGE UP
ANION GAP SERPL CALC-SCNC: 18 MMOL/L — HIGH (ref 5–17)
APTT BLD: 26.6 SEC — SIGNIFICANT CHANGE UP (ref 24.5–35.6)
AST SERPL-CCNC: 26 U/L — SIGNIFICANT CHANGE UP
BASOPHILS # BLD AUTO: 0.05 K/UL — SIGNIFICANT CHANGE UP (ref 0–0.2)
BASOPHILS NFR BLD AUTO: 0.3 % — SIGNIFICANT CHANGE UP (ref 0–2)
BILIRUB SERPL-MCNC: 0.4 MG/DL — SIGNIFICANT CHANGE UP (ref 0.4–2)
BLD GP AB SCN SERPL QL: SIGNIFICANT CHANGE UP
BUN SERPL-MCNC: 17.7 MG/DL — SIGNIFICANT CHANGE UP (ref 8–20)
CALCIUM SERPL-MCNC: 9.3 MG/DL — SIGNIFICANT CHANGE UP (ref 8.4–10.5)
CHLORIDE SERPL-SCNC: 97 MMOL/L — SIGNIFICANT CHANGE UP (ref 96–108)
CO2 SERPL-SCNC: 20 MMOL/L — LOW (ref 22–29)
CREAT SERPL-MCNC: 0.8 MG/DL — SIGNIFICANT CHANGE UP (ref 0.5–1.3)
EGFR: 89 ML/MIN/1.73M2 — SIGNIFICANT CHANGE UP
EGFR: 89 ML/MIN/1.73M2 — SIGNIFICANT CHANGE UP
EOSINOPHIL # BLD AUTO: 0.01 K/UL — SIGNIFICANT CHANGE UP (ref 0–0.5)
EOSINOPHIL NFR BLD AUTO: 0.1 % — SIGNIFICANT CHANGE UP (ref 0–6)
GLUCOSE SERPL-MCNC: 104 MG/DL — HIGH (ref 70–99)
HCG SERPL-ACNC: <4 MIU/ML — SIGNIFICANT CHANGE UP
HCT VFR BLD CALC: 30.4 % — LOW (ref 34.5–45)
HGB BLD-MCNC: 10 G/DL — LOW (ref 11.5–15.5)
IMM GRANULOCYTES NFR BLD AUTO: 0.5 % — SIGNIFICANT CHANGE UP (ref 0–0.9)
INR BLD: 1.07 RATIO — SIGNIFICANT CHANGE UP (ref 0.85–1.18)
LYMPHOCYTES # BLD AUTO: 18 % — SIGNIFICANT CHANGE UP (ref 13–44)
LYMPHOCYTES # BLD AUTO: 2.62 K/UL — SIGNIFICANT CHANGE UP (ref 1–3.3)
MCHC RBC-ENTMCNC: 24.9 PG — LOW (ref 27–34)
MCHC RBC-ENTMCNC: 32.9 GM/DL — SIGNIFICANT CHANGE UP (ref 32–36)
MCV RBC AUTO: 75.8 FL — LOW (ref 80–100)
MONOCYTES # BLD AUTO: 0.99 K/UL — HIGH (ref 0–0.9)
MONOCYTES NFR BLD AUTO: 6.8 % — SIGNIFICANT CHANGE UP (ref 2–14)
NEUTROPHILS # BLD AUTO: 10.81 K/UL — HIGH (ref 1.8–7.4)
NEUTROPHILS NFR BLD AUTO: 74.3 % — SIGNIFICANT CHANGE UP (ref 43–77)
PLATELET # BLD AUTO: 432 K/UL — HIGH (ref 150–400)
POTASSIUM SERPL-MCNC: 3.7 MMOL/L — SIGNIFICANT CHANGE UP (ref 3.5–5.3)
POTASSIUM SERPL-SCNC: 3.7 MMOL/L — SIGNIFICANT CHANGE UP (ref 3.5–5.3)
PROT SERPL-MCNC: 7.4 G/DL — SIGNIFICANT CHANGE UP (ref 6.6–8.7)
PROTHROM AB SERPL-ACNC: 11.9 SEC — SIGNIFICANT CHANGE UP (ref 9.5–13)
RBC # BLD: 4.01 M/UL — SIGNIFICANT CHANGE UP (ref 3.8–5.2)
RBC # FLD: 19.9 % — HIGH (ref 10.3–14.5)
SODIUM SERPL-SCNC: 135 MMOL/L — SIGNIFICANT CHANGE UP (ref 135–145)
WBC # BLD: 14.56 K/UL — HIGH (ref 3.8–10.5)
WBC # FLD AUTO: 14.56 K/UL — HIGH (ref 3.8–10.5)

## 2024-07-13 PROCEDURE — 99291 CRITICAL CARE FIRST HOUR: CPT

## 2024-07-13 RX ORDER — ONDANSETRON HCL/PF 4 MG/2 ML
4 VIAL (ML) INJECTION ONCE
Refills: 0 | Status: COMPLETED | OUTPATIENT
Start: 2024-07-13 | End: 2024-07-13

## 2024-07-13 RX ADMIN — Medication 4 MILLIGRAM(S): at 22:04

## 2024-07-13 RX ADMIN — Medication 1000 MILLILITER(S): at 20:27

## 2024-07-14 VITALS
OXYGEN SATURATION: 100 % | RESPIRATION RATE: 19 BRPM | DIASTOLIC BLOOD PRESSURE: 78 MMHG | TEMPERATURE: 98 F | SYSTOLIC BLOOD PRESSURE: 105 MMHG | HEART RATE: 79 BPM

## 2024-07-14 LAB
HCT VFR BLD CALC: 22.6 % — LOW (ref 34.5–45)
HGB BLD-MCNC: 7.6 G/DL — LOW (ref 11.5–15.5)
MCHC RBC-ENTMCNC: 26.1 PG — LOW (ref 27–34)
MCHC RBC-ENTMCNC: 33.6 GM/DL — SIGNIFICANT CHANGE UP (ref 32–36)
MCV RBC AUTO: 77.7 FL — LOW (ref 80–100)
PLATELET # BLD AUTO: 278 K/UL — SIGNIFICANT CHANGE UP (ref 150–400)
RBC # BLD: 2.91 M/UL — LOW (ref 3.8–5.2)
RBC # FLD: 19.5 % — HIGH (ref 10.3–14.5)
WBC # BLD: 11.24 K/UL — HIGH (ref 3.8–10.5)
WBC # FLD AUTO: 11.24 K/UL — HIGH (ref 3.8–10.5)

## 2024-07-14 PROCEDURE — 36415 COLL VENOUS BLD VENIPUNCTURE: CPT

## 2024-07-14 PROCEDURE — 99291 CRITICAL CARE FIRST HOUR: CPT

## 2024-07-14 PROCEDURE — 85027 COMPLETE CBC AUTOMATED: CPT

## 2024-07-14 PROCEDURE — 85730 THROMBOPLASTIN TIME PARTIAL: CPT

## 2024-07-14 PROCEDURE — 85610 PROTHROMBIN TIME: CPT

## 2024-07-14 PROCEDURE — 57210 REPAIR VAGINA/PERINEUM: CPT

## 2024-07-14 PROCEDURE — 84702 CHORIONIC GONADOTROPIN TEST: CPT

## 2024-07-14 PROCEDURE — 86901 BLOOD TYPING SEROLOGIC RH(D): CPT

## 2024-07-14 PROCEDURE — 36430 TRANSFUSION BLD/BLD COMPNT: CPT

## 2024-07-14 PROCEDURE — 85025 COMPLETE CBC W/AUTO DIFF WBC: CPT

## 2024-07-14 PROCEDURE — 86900 BLOOD TYPING SEROLOGIC ABO: CPT

## 2024-07-14 PROCEDURE — P9016: CPT

## 2024-07-14 PROCEDURE — 80053 COMPREHEN METABOLIC PANEL: CPT

## 2024-07-14 PROCEDURE — 86923 COMPATIBILITY TEST ELECTRIC: CPT

## 2024-07-14 PROCEDURE — 86850 RBC ANTIBODY SCREEN: CPT

## 2024-07-14 RX ORDER — SERTRALINE 100 MG/1
50 TABLET, FILM COATED ORAL DAILY
Refills: 0 | Status: DISCONTINUED | OUTPATIENT
Start: 2024-07-14 | End: 2024-07-14

## 2024-07-14 RX ORDER — SODIUM CHLORIDE 9 G/1000ML
1000 INJECTION, SOLUTION INTRAVENOUS
Refills: 0 | Status: DISCONTINUED | OUTPATIENT
Start: 2024-07-14 | End: 2024-07-14

## 2024-07-14 RX ORDER — FENTANYL CITRATE-0.9 % NACL/PF 100MCG/2ML
50 SYRINGE (ML) INTRAVENOUS
Refills: 0 | Status: DISCONTINUED | OUTPATIENT
Start: 2024-07-14 | End: 2024-07-14

## 2024-07-14 RX ORDER — FERROUS SULFATE 137(45) MG
1 TABLET, EXTENDED RELEASE ORAL
Qty: 30 | Refills: 2
Start: 2024-07-14 | End: 2024-10-11

## 2024-07-14 RX ORDER — HYDROMORPHONE/SOD CHLOR,ISO/PF 2 MG/10 ML
0.5 SYRINGE (ML) INJECTION
Refills: 0 | Status: DISCONTINUED | OUTPATIENT
Start: 2024-07-14 | End: 2024-07-14

## 2024-07-14 RX ADMIN — Medication 0.5 MILLIGRAM(S): at 01:12

## 2024-07-14 RX ADMIN — SODIUM CHLORIDE 75 MILLILITER(S): 9 INJECTION, SOLUTION INTRAVENOUS at 02:02

## 2024-07-14 RX ADMIN — Medication 0.5 MILLIGRAM(S): at 01:02

## 2024-07-18 ENCOUNTER — APPOINTMENT (OUTPATIENT)
Dept: UROLOGY | Facility: CLINIC | Age: 49
End: 2024-07-18
Payer: MEDICAID

## 2024-07-18 ENCOUNTER — APPOINTMENT (OUTPATIENT)
Dept: OBGYN | Facility: CLINIC | Age: 49
End: 2024-07-18
Payer: MEDICAID

## 2024-07-18 VITALS
BODY MASS INDEX: 27.64 KG/M2 | HEIGHT: 63 IN | WEIGHT: 156 LBS | SYSTOLIC BLOOD PRESSURE: 98 MMHG | DIASTOLIC BLOOD PRESSURE: 67 MMHG | HEART RATE: 91 BPM

## 2024-07-18 VITALS — DIASTOLIC BLOOD PRESSURE: 68 MMHG | SYSTOLIC BLOOD PRESSURE: 100 MMHG | BODY MASS INDEX: 27.46 KG/M2 | WEIGHT: 155 LBS

## 2024-07-18 DIAGNOSIS — R10.2 PELVIC AND PERINEAL PAIN: ICD-10-CM

## 2024-07-18 DIAGNOSIS — N39.46 MIXED INCONTINENCE: ICD-10-CM

## 2024-07-18 LAB
BILIRUB UR QL STRIP: NORMAL
CLARITY UR: CLEAR
COLLECTION METHOD: NORMAL
GLUCOSE UR-MCNC: NORMAL
HCG UR QL: 0.2 EU/DL
HGB UR QL STRIP.AUTO: NORMAL
KETONES UR-MCNC: NORMAL
LEUKOCYTE ESTERASE UR QL STRIP: ABNORMAL
NITRITE UR QL STRIP: NORMAL
PH UR STRIP: 7
PROT UR STRIP-MCNC: NORMAL
SP GR UR STRIP: 1.01

## 2024-07-18 PROCEDURE — 99204 OFFICE O/P NEW MOD 45 MIN: CPT

## 2024-07-18 PROCEDURE — 99214 OFFICE O/P EST MOD 30 MIN: CPT

## 2024-07-18 PROCEDURE — 99459 PELVIC EXAMINATION: CPT

## 2024-07-18 PROCEDURE — 81003 URINALYSIS AUTO W/O SCOPE: CPT | Mod: QW

## 2024-07-18 RX ORDER — TROSPIUM CHLORIDE 20 MG/1
20 TABLET, FILM COATED ORAL
Qty: 60 | Refills: 4 | Status: ACTIVE | COMMUNITY
Start: 2024-07-18 | End: 1900-01-01

## 2024-07-30 ENCOUNTER — APPOINTMENT (OUTPATIENT)
Dept: UROLOGY | Facility: CLINIC | Age: 49
End: 2024-07-30

## 2024-07-31 ENCOUNTER — APPOINTMENT (OUTPATIENT)
Dept: OBGYN | Facility: CLINIC | Age: 49
End: 2024-07-31

## 2024-08-01 ENCOUNTER — APPOINTMENT (OUTPATIENT)
Dept: RHEUMATOLOGY | Facility: CLINIC | Age: 49
End: 2024-08-01

## 2024-08-01 ENCOUNTER — APPOINTMENT (OUTPATIENT)
Dept: NEUROLOGY | Facility: CLINIC | Age: 49
End: 2024-08-01
Payer: MEDICAID

## 2024-08-01 ENCOUNTER — APPOINTMENT (OUTPATIENT)
Dept: RHEUMATOLOGY | Facility: CLINIC | Age: 49
End: 2024-08-01
Payer: MEDICAID

## 2024-08-01 VITALS
HEART RATE: 85 BPM | TEMPERATURE: 98 F | SYSTOLIC BLOOD PRESSURE: 100 MMHG | OXYGEN SATURATION: 99 % | BODY MASS INDEX: 27.46 KG/M2 | RESPIRATION RATE: 17 BRPM | HEIGHT: 63 IN | WEIGHT: 155 LBS | DIASTOLIC BLOOD PRESSURE: 74 MMHG

## 2024-08-01 DIAGNOSIS — I25.2 OLD MYOCARDIAL INFARCTION: ICD-10-CM

## 2024-08-01 DIAGNOSIS — L93.2 OTHER LOCAL LUPUS ERYTHEMATOSUS: ICD-10-CM

## 2024-08-01 DIAGNOSIS — Z82.61 FAMILY HISTORY OF ARTHRITIS: ICD-10-CM

## 2024-08-01 DIAGNOSIS — R68.2 DRY MOUTH, UNSPECIFIED: ICD-10-CM

## 2024-08-01 DIAGNOSIS — R20.2 PARESTHESIA OF SKIN: ICD-10-CM

## 2024-08-01 DIAGNOSIS — M32.9 SYSTEMIC LUPUS ERYTHEMATOSUS, UNSPECIFIED: ICD-10-CM

## 2024-08-01 DIAGNOSIS — M79.7 FIBROMYALGIA: ICD-10-CM

## 2024-08-01 DIAGNOSIS — R53.83 OTHER FATIGUE: ICD-10-CM

## 2024-08-01 DIAGNOSIS — M13.0 POLYARTHRITIS, UNSPECIFIED: ICD-10-CM

## 2024-08-01 DIAGNOSIS — L65.9 NONSCARRING HAIR LOSS, UNSPECIFIED: ICD-10-CM

## 2024-08-01 DIAGNOSIS — R52 PAIN, UNSPECIFIED: ICD-10-CM

## 2024-08-01 DIAGNOSIS — R27.0 ATAXIA, UNSPECIFIED: ICD-10-CM

## 2024-08-01 DIAGNOSIS — Z87.891 PERSONAL HISTORY OF NICOTINE DEPENDENCE: ICD-10-CM

## 2024-08-01 DIAGNOSIS — G62.9 POLYNEUROPATHY, UNSPECIFIED: ICD-10-CM

## 2024-08-01 DIAGNOSIS — F17.210 NICOTINE DEPENDENCE, CIGARETTES, UNCOMPLICATED: ICD-10-CM

## 2024-08-01 DIAGNOSIS — Z56.0 UNEMPLOYMENT, UNSPECIFIED: ICD-10-CM

## 2024-08-01 DIAGNOSIS — Z78.9 OTHER SPECIFIED HEALTH STATUS: ICD-10-CM

## 2024-08-01 PROCEDURE — 99205 OFFICE O/P NEW HI 60 MIN: CPT

## 2024-08-01 PROCEDURE — 99204 OFFICE O/P NEW MOD 45 MIN: CPT

## 2024-08-01 PROCEDURE — G2211 COMPLEX E/M VISIT ADD ON: CPT | Mod: NC,1L

## 2024-08-01 SDOH — ECONOMIC STABILITY - INCOME SECURITY: UNEMPLOYMENT, UNSPECIFIED: Z56.0

## 2024-08-01 NOTE — HISTORY OF PRESENT ILLNESS
[Arthralgias] : arthralgias [Myalgias] : myalgias [FreeTextEntry1] : 49 year old female with PMHx as listed below reports that about 20 years ago, she began to experience rashes, rita on her face.  She saw dermatology, who performed a skin biopsy which reportedly was c/w lupus.  She reports that she was never treated for it.  She denies any current skin lesions but reports taht they occur occasionally, lasting for a few days at a time. In addition, she reports that she has been experiencing diffuse pain all over her body, as well as persistent fatigue.  The pain is constant, occurring both with activity and at rest.  She describes the pain as achy, sharp, and burning, 8-10 out of 10.  (+)swelling in her left knee.  (+)AM stiffness throughout her body, usually lasting 1-2 hours.  She gets some relief from heat.  No other known alleviating factors.  She saw another rheumatologist, who diagnosed her with fibromyalgia several years ago.  However, she was lost to follow up and was never treated for the fibromyalgia. No F/C, no unintentional weight loss, no night sweats, no oral ulcers, no other rashes, no alopecia, no photosensitivity, no dry eyes/dry mouth, no Raynaud symptoms, no focal weakness, no dysphagia  [Anorexia] : no anorexia [Weight Loss] : no weight loss [Malaise] : no malaise [Fever] : no fever [Chills] : no chills [Fatigue] : no fatigue [Malar Facial Rash] : no malar facial rash [Skin Lesions] : no lesions [Skin Nodules] : no skin nodules [Oral Ulcers] : no oral ulcers [Cough] : no cough [Dry Mouth] : no dry mouth [Dysphonia] : no dysphonia [Dysphagia] : no dysphagia [Shortness of Breath] : no shortness of breath [Chest Pain] : no chest pain [Joint Swelling] : no joint swelling [Joint Warmth] : no joint warmth [Joint Deformity] : no joint deformity [Decreased ROM] : no decreased range of motion [Morning Stiffness] : no morning stiffness [Falls] : no falls [Difficulty Standing] : no difficulty standing [Difficulty Walking] : no difficulty walking [Dyspnea] : no dyspnea [Muscle Weakness] : no muscle weakness [Muscle Spasms] : no muscle spasms [Muscle Cramping] : no muscle cramping [Visual Changes] : no visual changes [Eye Pain] : no eye pain [Eye Redness] : no eye redness [Dry Eyes] : no dry eyes

## 2024-08-01 NOTE — ASSESSMENT
[FreeTextEntry1] : Reports balance difficulties Neurological examination unremarkable Gait shows favoring of the left leg due to knee pain. Brain MRI showed no clinically relevant abnormalities Also complaining about a lot of numbness in the arms  She will return for EMG and nerve conduction studies of the upper and lower extremities for further evaluation  Encourage smoking cessation

## 2024-08-01 NOTE — HISTORY OF PRESENT ILLNESS
[FreeTextEntry1] : This 49-year-old woman was seen in neurological consultation today She reports balance difficulties going on for about the last 4 to 5 months.  Says she  has actually fallen about 4 times under unclear circumstances She has not lost consciousness but says she just would lose her balance She injured her knee about 5 months ago and that is causing difficulty with walking as well She has tried some physical therapy which did not help much  She also reported arms and hands going numb especially upon awakening in the morning.  Says has been going on for a long time She has fibromyalgia and has diffuse pains including the neck and lower back  Medical history otherwise significant for coronary artery disease status post MI and stent, lupus, fibromyalgia, bipolar disorder  She does smoke, no alcohol use, not employed

## 2024-08-01 NOTE — CONSULT LETTER
[Dear  ___] : Dear  [unfilled], [Consult Letter:] : I had the pleasure of evaluating your patient, [unfilled]. [Please see my note below.] : Please see my note below. [Consult Closing:] : Thank you very much for allowing me to participate in the care of this patient.  If you have any questions, please do not hesitate to contact me. [Sincerely,] : Sincerely, [FreeTextEntry3] : Tomas Ding MD Rheumatology [facility] [place] [facility] Saldana Kelsey [facility] at [address] [place] [address]  phone:  [phone] fax:      [phone]

## 2024-08-01 NOTE — PHYSICAL EXAM
[General Appearance - Alert] : alert [General Appearance - In No Acute Distress] : in no acute distress [Sclera] : the sclera and conjunctiva were normal [Outer Ear] : the ears and nose were normal in appearance [Oropharynx] : the oropharynx was normal [Neck Appearance] : the appearance of the neck was normal [Neck Cervical Mass (___cm)] : no neck mass was observed [Jugular Venous Distention Increased] : there was no jugular-venous distention [Thyroid Diffuse Enlargement] : the thyroid was not enlarged [Thyroid Nodule] : there were no palpable thyroid nodules [Auscultation Breath Sounds / Voice Sounds] : lungs were clear to auscultation bilaterally [Heart Rate And Rhythm] : heart rate was normal and rhythm regular [Heart Sounds] : normal S1 and S2 [Heart Sounds Gallop] : no gallops [Murmurs] : no murmurs [Heart Sounds Pericardial Friction Rub] : no pericardial rub [Edema] : there was no peripheral edema [Bowel Sounds] : normal bowel sounds [Abdomen Soft] : soft [Abdomen Tenderness] : non-tender [Abdomen Mass (___ Cm)] : no abdominal mass palpated [Cervical Lymph Nodes Enlarged Posterior Bilaterally] : posterior cervical [Cervical Lymph Nodes Enlarged Anterior Bilaterally] : anterior cervical [Supraclavicular Lymph Nodes Enlarged Bilaterally] : supraclavicular [Skin Color & Pigmentation] : normal skin color and pigmentation [Skin Turgor] : normal skin turgor [] : no rash [No Focal Deficits] : no focal deficits [Oriented To Time, Place, And Person] : oriented to person, place, and time [Impaired Insight] : insight and judgment were intact [Affect] : the affect was normal [FreeTextEntry1] : (+)diffuse tenderness, rita in hands;  B/L shoulders w/ pain upon movement in all planes;  B/L ips w/pain upon internal rotation and external rotation;  BN/L knees w/pain  upon flexion/extension

## 2024-08-01 NOTE — DATA REVIEWED
[de-identified] : Brain MRI dated 4/9/2024 reports mild to moderate nonspecific white matter changes and moderate inflammatory paraspinal sinus disease

## 2024-08-01 NOTE — ASSESSMENT
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  No obvious signs/symptoms of SLE at this time.  No current lesions on skin   - Check labs, including serologies.     - x-rays of hands, feet   - If lesions begin to recure and/or if evidence of SLE, will plan to likely start Plaquenil. 2)  Diffuse pain, persistent fatigue:  most c/w fibromyalgia though this is a diagnosis of exclusion.  pt also w/ evidence of polyarticular osteoarthritis on prior x-rays.   - Discussed importance of exercise   - Tylenol prn (pt unable to take NSAID's as she is on Plavix).   - Cont cyclobenzaprine - advised her to take it qhs for now and can titrate up as tolerated.   - heating pads or ice packs   - OTC topical analgesics

## 2024-08-01 NOTE — DATA REVIEWED
[FreeTextEntry1] : x-rays B/L shoulders (4/9/2024):  Minimal osteoarthropathy of the glenohumeral joint appears unchanged since the prior study.  Minimal osteoarthropathy of the acromioclavicular joint is again noted.  Heterotopic mineralization measuring approximately 3 mm is observed just superior to the posterior aspect of the greater tuberosity, probably representing calcific rotator cuff tendinopathy.  No acute fracture or post fracture deformity is identified. x-rays B/L knees (7/18/2024):  unremarkable x-rays B/L hips (4/9/2024):  Small bilateral greater trochanteric enthesophytes .  Otherwise unremarkable x-rays of C-spine (4/9/2024)   Straightening of the cervical curvature is observed, a finding that may relate to muscle spasm or spondylosis.  At C4-5, mild spondylosis is observed.  At C5-6, moderate disc narrowing and moderate spondylosis are observed.  At C6-7, mild disc narrowing and mild spondylosis are observed.  At C7-T1, facet arthropathy is observed.  No subluxation or compression fracture is identified.  Surgical clips are identified in the region of the right lobe of the thyroid gland.  No prevertebral soft tissue edema is identified. x-rays L-spine (4/9/2024):  There are 5 lumbar vertebral segments. No scoliosis is observed.  Mild multilevel spondylosis appears unchanged since the prior study with particular involvement of L1-2. Disc narrowing is also observed at L1-2.  Multilevel facet arthropathy is again noted with particular involvement of L5-S1.  There is no evidence of compression fracture or spondylolisthesis.  Again, 2 calcifications measuring approximately 5 mm in diameter involve the right mid abdomen, probably representing right lower pole nephrolithiasis.  Pelvic surgical wires are again noted. A metallic object is projected over the umbilical region.

## 2024-08-01 NOTE — PHYSICAL EXAM
[FreeTextEntry1] : Diffuse spinal palpation tenderness, mild Full range of movement of the cervical and lumbar spines.  There is full range of movement of the cervical spine. Tinel sign negative at the wrists and elbows. Phalen sign negative at the wrists. Adson's maneuver negative bilaterally. [General Appearance - Alert] : alert [General Appearance - In No Acute Distress] : in no acute distress [General Appearance - Well-Appearing] : healthy appearing [Oriented To Time, Place, And Person] : oriented to person, place, and time [Impaired Insight] : insight and judgment were intact [Affect] : the affect was normal [Memory Recent] : recent memory was not impaired [Person] : oriented to person [Place] : oriented to place [Time] : oriented to time [Span Intact] : the attention span was normal [Concentration Intact] : normal concentrating ability [Visual Intact] : visual attention was ~T not ~L decreased [Fluency] : fluency intact [Comprehension] : comprehension intact [Past History] : adequate knowledge of personal past history [Cranial Nerves Optic (II)] : visual acuity intact bilaterally,  visual fields full to confrontation, pupils equal round and reactive to light [Cranial Nerves Oculomotor (III)] : extraocular motion intact [Cranial Nerves Trigeminal (V)] : facial sensation intact symmetrically [Cranial Nerves Facial (VII)] : face symmetrical [Cranial Nerves Vestibulocochlear (VIII)] : hearing was intact bilaterally [Cranial Nerves Glossopharyngeal (IX)] : tongue and palate midline [Cranial Nerves Accessory (XI - Cranial And Spinal)] : head turning and shoulder shrug symmetric [Cranial Nerves Hypoglossal (XII)] : there was no tongue deviation with protrusion [Motor Tone] : muscle tone was normal in all four extremities [Motor Strength] : muscle strength was normal in all four extremities [No Muscle Atrophy] : normal bulk in all four extremities [Paresis Pronator Drift Right-Sided] : no pronator drift on the right [Paresis Pronator Drift Left-Sided] : no pronator drift on the left [Sensation Tactile Decrease] : light touch was intact [Sensation Pain / Temperature Decrease] : pain and temperature was intact [Proprioception] : proprioception was intact [Romberg's Sign] : Romberg's sign was negtive [Balance] : balance was intact [Past-pointing] : there was no past-pointing [Tremor] : no tremor present [Coordination - Dysmetria Impaired Finger-to-Nose Bilateral] : not present [Coordination - Dysmetria Impaired Heel-to-Shin Bilateral] : not present [2+] : Ankle jerk left 2+ [FreeTextEntry8] : Gait antalgic with favoring of the left leg due to knee pain.  Uses a cane typically but able to walk independently in the office. [PERRL With Normal Accommodation] : pupils were equal in size, round, reactive to light, with normal accommodation [Extraocular Movements] : extraocular movements were intact [Full Visual Field] : full visual field

## 2024-08-26 DIAGNOSIS — M25.562 PAIN IN LEFT KNEE: ICD-10-CM

## 2024-08-28 ENCOUNTER — APPOINTMENT (OUTPATIENT)
Dept: ORTHOPEDIC SURGERY | Facility: CLINIC | Age: 49
End: 2024-08-28

## 2024-08-29 ENCOUNTER — APPOINTMENT (OUTPATIENT)
Dept: OBGYN | Facility: CLINIC | Age: 49
End: 2024-08-29

## 2024-09-05 ENCOUNTER — APPOINTMENT (OUTPATIENT)
Dept: OBGYN | Facility: CLINIC | Age: 49
End: 2024-09-05
Payer: MEDICAID

## 2024-09-05 ENCOUNTER — LABORATORY RESULT (OUTPATIENT)
Age: 49
End: 2024-09-05

## 2024-09-05 ENCOUNTER — APPOINTMENT (OUTPATIENT)
Dept: RHEUMATOLOGY | Facility: CLINIC | Age: 49
End: 2024-09-05
Payer: MEDICAID

## 2024-09-05 VITALS
HEIGHT: 63 IN | WEIGHT: 153 LBS | DIASTOLIC BLOOD PRESSURE: 76 MMHG | BODY MASS INDEX: 27.11 KG/M2 | SYSTOLIC BLOOD PRESSURE: 100 MMHG

## 2024-09-05 VITALS
DIASTOLIC BLOOD PRESSURE: 70 MMHG | SYSTOLIC BLOOD PRESSURE: 117 MMHG | HEIGHT: 63 IN | OXYGEN SATURATION: 98 % | HEART RATE: 93 BPM | TEMPERATURE: 98 F

## 2024-09-05 DIAGNOSIS — M13.0 POLYARTHRITIS, UNSPECIFIED: ICD-10-CM

## 2024-09-05 DIAGNOSIS — N90.5 ATROPHY OF VULVA: ICD-10-CM

## 2024-09-05 DIAGNOSIS — M79.7 FIBROMYALGIA: ICD-10-CM

## 2024-09-05 DIAGNOSIS — R68.2 DRY MOUTH, UNSPECIFIED: ICD-10-CM

## 2024-09-05 DIAGNOSIS — R52 PAIN, UNSPECIFIED: ICD-10-CM

## 2024-09-05 DIAGNOSIS — L65.9 NONSCARRING HAIR LOSS, UNSPECIFIED: ICD-10-CM

## 2024-09-05 DIAGNOSIS — R53.83 OTHER FATIGUE: ICD-10-CM

## 2024-09-05 DIAGNOSIS — N95.2 POSTMENOPAUSAL ATROPHIC VAGINITIS: ICD-10-CM

## 2024-09-05 DIAGNOSIS — L93.2 OTHER LOCAL LUPUS ERYTHEMATOSUS: ICD-10-CM

## 2024-09-05 DIAGNOSIS — N90.89 OTHER SPECIFIED NONINFLAMMATORY DISORDERS OF VULVA AND PERINEUM: ICD-10-CM

## 2024-09-05 PROCEDURE — 99214 OFFICE O/P EST MOD 30 MIN: CPT

## 2024-09-05 PROCEDURE — 99214 OFFICE O/P EST MOD 30 MIN: CPT | Mod: 25

## 2024-09-05 PROCEDURE — 99396 PREV VISIT EST AGE 40-64: CPT

## 2024-09-05 PROCEDURE — G2211 COMPLEX E/M VISIT ADD ON: CPT | Mod: NC

## 2024-09-05 PROCEDURE — 99459 PELVIC EXAMINATION: CPT

## 2024-09-05 RX ORDER — TRAZODONE HYDROCHLORIDE 150 MG/1
150 TABLET ORAL
Refills: 0 | Status: ACTIVE | COMMUNITY

## 2024-09-05 RX ORDER — CYCLOBENZAPRINE HYDROCHLORIDE 10 MG/1
10 TABLET, FILM COATED ORAL
Qty: 90 | Refills: 3 | Status: ACTIVE | COMMUNITY
Start: 2024-09-05 | End: 1900-01-01

## 2024-09-05 NOTE — PHYSICAL EXAM
[Chaperone Present] : A chaperone was present in the examining room during all aspects of the physical examination [26071] : A chaperone was present during the pelvic exam. [FreeTextEntry2] : Mario [Appropriately responsive] : appropriately responsive [Alert] : alert [No Acute Distress] : no acute distress [No Lymphadenopathy] : no lymphadenopathy [Regular Rate Rhythm] : regular rate rhythm [No Murmurs] : no murmurs [Clear to Auscultation B/L] : clear to auscultation bilaterally [Soft] : soft [Non-tender] : non-tender [Non-distended] : non-distended [No HSM] : No HSM [No Lesions] : no lesions [No Mass] : no mass [Oriented x3] : oriented x3 [Examination Of The Breasts] : a normal appearance [No Masses] : no breast masses were palpable [Vulvar Atrophy] : vulvar atrophy [Labia Majora] : normal [Labia Minora] : normal [Atrophy] : atrophy [Normal] : normal [Uterine Adnexae] : normal [Declined] : Patient declined rectal exam [FreeTextEntry1] : Vulvar lesions viral cultures sent with typing.

## 2024-09-05 NOTE — DATA REVIEWED
[FreeTextEntry1] : Hgb 9.2;  rest of CBC unremarkable CO2 19,  alk phos 127;  rest of CMP unremarkable U Pr/Cr 332 SHIRIN negative SSA/SSB, Sm, RNP, thyroid AB's - all negative RF, CCP negative C3/C4 WNL ESR 45 / CRP 4 mg/L

## 2024-09-05 NOTE — HISTORY OF PRESENT ILLNESS
[FreeTextEntry1] : 49-year-old -0-6-3 presents for GYN evaluation.  She had vaginal laceration with sexual activities on 2024 and had surgical repair.  No more pain or bleeding with sexual activities but complains of vaginal dryness.  She also complains of vulvar lesions.

## 2024-09-05 NOTE — ASSESSMENT
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  No obvious signs/symptoms of SLE at this time.  No current lesions on skin   - If lesions begin to recure and/or if evidence of SLE, will plan to likely start Plaquenil. 2)  Diffuse pain, persistent fatigue:  most c/w fibromyalgia.  W/u for other etiologies negative.   - Reiterated importance of exercise   - Increase cyclobenzaprine to 10mg - advised her to take it qhs for now and can titrate up as tolerated. 3)  Polyarticular OA, rita hands, shoulders, knees:   - Reiterated importance of exercise   - Cont Tylenol prn (pt unable to take NSAID's as she is on Plavix).   - heating pads or ice packs   - OTC topical analgesics 4)  Left knee pain s/p recent fall:  pt underwent MRI yesterday - results not yet available.

## 2024-09-05 NOTE — DISCUSSION/SUMMARY
[FreeTextEntry1] : 49-year-old presents for GYN evaluation.  Recently had vaginal repair from tear from sexual activities.  Complains of vulvovaginal irritation and vulvar lesion.  Physical exam showed vulvar lesion and herpes testing obtained.  Pap GC chlamydia sent.  Mammogram reviewed. Safe sexual practices discussed. Diet and exercise discussed. Return in 6 months for follow-up.

## 2024-09-05 NOTE — HISTORY OF PRESENT ILLNESS
[Arthralgias] : arthralgias [Myalgias] : myalgias [FreeTextEntry1] : 9/5/2024:  Feeling "the same" overall.  Still w/ diffuse pain, unchanged.  Also still w/ joint pains, including her hands, shoulders, and knees.  She also c/o left knee pain s/p a recent fall. She underwent an MRI yesterday - results not yet available. [Anorexia] : no anorexia [Weight Loss] : no weight loss [Malaise] : no malaise [Fever] : no fever [Chills] : no chills [Fatigue] : no fatigue [Malar Facial Rash] : no malar facial rash [Skin Lesions] : no lesions [Skin Nodules] : no skin nodules [Oral Ulcers] : no oral ulcers [Cough] : no cough [Dry Mouth] : no dry mouth [Dysphonia] : no dysphonia [Dysphagia] : no dysphagia [Shortness of Breath] : no shortness of breath [Chest Pain] : no chest pain [Joint Swelling] : no joint swelling [Joint Warmth] : no joint warmth [Joint Deformity] : no joint deformity [Decreased ROM] : no decreased range of motion [Morning Stiffness] : no morning stiffness [Falls] : no falls [Difficulty Standing] : no difficulty standing [Difficulty Walking] : no difficulty walking [Dyspnea] : no dyspnea [Muscle Weakness] : no muscle weakness [Muscle Spasms] : no muscle spasms [Muscle Cramping] : no muscle cramping [Visual Changes] : no visual changes [Eye Pain] : no eye pain [Eye Redness] : no eye redness [Dry Eyes] : no dry eyes

## 2024-09-09 LAB
HHV SPEC CULT: ABNORMAL
HSV TYPE 1: NORMAL
HSV TYPE 2: ABNORMAL

## 2024-09-10 ENCOUNTER — APPOINTMENT (OUTPATIENT)
Dept: ORTHOPEDIC SURGERY | Facility: CLINIC | Age: 49
End: 2024-09-10
Payer: MEDICAID

## 2024-09-10 VITALS
HEART RATE: 84 BPM | SYSTOLIC BLOOD PRESSURE: 102 MMHG | DIASTOLIC BLOOD PRESSURE: 67 MMHG | BODY MASS INDEX: 27.11 KG/M2 | WEIGHT: 153 LBS | HEIGHT: 63 IN

## 2024-09-10 DIAGNOSIS — Z91.81 HISTORY OF FALLING: ICD-10-CM

## 2024-09-10 DIAGNOSIS — S39.012A STRAIN OF MUSCLE, FASCIA AND TENDON OF LOWER BACK, INITIAL ENCOUNTER: ICD-10-CM

## 2024-09-10 LAB — CYTOLOGY CVX/VAG DOC THIN PREP: ABNORMAL

## 2024-09-10 PROCEDURE — 72100 X-RAY EXAM L-S SPINE 2/3 VWS: CPT

## 2024-09-10 PROCEDURE — 99214 OFFICE O/P EST MOD 30 MIN: CPT

## 2024-09-11 DIAGNOSIS — M25.562 PAIN IN LEFT KNEE: ICD-10-CM

## 2024-09-12 ENCOUNTER — APPOINTMENT (OUTPATIENT)
Dept: ORTHOPEDIC SURGERY | Facility: CLINIC | Age: 49
End: 2024-09-12
Payer: MEDICAID

## 2024-09-12 VITALS
HEIGHT: 63 IN | HEART RATE: 76 BPM | BODY MASS INDEX: 27.11 KG/M2 | DIASTOLIC BLOOD PRESSURE: 69 MMHG | WEIGHT: 153 LBS | SYSTOLIC BLOOD PRESSURE: 100 MMHG

## 2024-09-12 DIAGNOSIS — S83.249A OTHER TEAR OF MEDIAL MENISCUS, CURRENT INJURY, UNSPECIFIED KNEE, INITIAL ENCOUNTER: ICD-10-CM

## 2024-09-12 PROCEDURE — 99213 OFFICE O/P EST LOW 20 MIN: CPT

## 2024-09-12 PROCEDURE — 73564 X-RAY EXAM KNEE 4 OR MORE: CPT | Mod: LT

## 2024-09-12 NOTE — PHYSICAL EXAM
[de-identified] : GENERAL APPEARANCE: Well nourished and hydrated, pleasant, alert, and oriented x 3. Appears their stated age.  HEENT: Normocephalic, extraocular eye motion intact. Nasal septum midline. Oral cavity clear. External auditory canal clear.  RESPIRATORY: Breath sounds clear and audible in all lobes. No wheezing, No accessory muscle use. CARDIOVASCULAR: No apparent abnormalities. No lower leg edema. No varicosities. Pedal pulses are palpable. NEUROLOGIC: Sensation is normal, no muscle weakness in the upper or lower extremities. DERMATOLOGIC: No apparent skin lesions, moist, warm, no rash. SPINE: Cervical spine appears normal and moves freely; thoracic spine appears normal and moves freely; lumbosacral spine appears normal and moves freely, normal, nontender. MUSCULOSKELETAL: Hands, wrists, and elbows are normal and move freely, shoulders are normal and move freely.  Psychiatric: Oriented to person, place, and time, insight and judgement were intact and the affect was normal.  Musculoskeletal:. Left knee exam shows no effusion, ROM is 0-1 30 degrees, no instability, no pain with Jayla, no joint line tenderness.  There is full extensor mechanism strength against resistance.  Mild tenderness palpation around the patella 5/5 motor strength in bilateral lower extremities. Sensory: Intact in bilateral lower extremities. DTRs: Biceps, brachioradialis, triceps, patellar, ankle and plantar 2+ and symmetric bilaterally. Pulses: dorsalis pedis, posterior tibial, femoral, popliteal, and radial 2+ and symmetric bilaterally.  Constitutional: Alert and in no acute distress, but well-appearing.  [de-identified] : 4 views left knee obtained the office today show no acute fracture or dislocation.  No significant degenerative changes noted.  MRI of the left knee dated 9/3/2024 shows suggestion of a meniscocapsular separation of the posterior horn the medial meniscus.  Small effusion.  Small popliteal cyst.  Mild bursitis.  Mild edema in the fat pad which may be impingement.

## 2024-09-12 NOTE — HISTORY OF PRESENT ILLNESS
[de-identified] : Patient is a 49-year-old female here today for evaluation of left knee pain is going on for many years.  She states that she has had multiple falls in the past.  Recently obtained an MRI of the beginning of the month.  However she states that 2 days ago she had a fall in the left knee.  States it is swollen.  The swelling is now resolved.  She is using a cane to ambulate due to her high risk of falls.  Denies any feelings of instability in the knee.  Denies any clicking.  Denies any locking

## 2024-09-12 NOTE — DISCUSSION/SUMMARY
[Medication Risks Reviewed] : Medication risks reviewed [de-identified] : Patient is a 49-year-old female here today for evaluation of left knee pain that has been going on for many years.  Based on her exam I see no acute change after her fall yesterday.  However her MRI was obtained prior to her fall.  I recommended conservative treatment at this time.  I have recommended low impact activity and exercise.  We discussed rest ice and elevation.  I have given a prescription for physical therapy.  She will take Tylenol as needed for the pain.  I will see her back in 6 weeks for repeat evaluation and possible repeat MRI if she continues to have pain.  All questions were asked and answered

## 2024-09-19 ENCOUNTER — APPOINTMENT (OUTPATIENT)
Dept: RHEUMATOLOGY | Facility: CLINIC | Age: 49
End: 2024-09-19
Payer: MEDICARE

## 2024-09-19 ENCOUNTER — NON-APPOINTMENT (OUTPATIENT)
Age: 49
End: 2024-09-19

## 2024-09-19 VITALS
HEIGHT: 63 IN | WEIGHT: 153 LBS | SYSTOLIC BLOOD PRESSURE: 110 MMHG | OXYGEN SATURATION: 97 % | DIASTOLIC BLOOD PRESSURE: 76 MMHG | BODY MASS INDEX: 27.11 KG/M2 | RESPIRATION RATE: 17 BRPM | HEART RATE: 94 BPM | TEMPERATURE: 98.2 F

## 2024-09-19 DIAGNOSIS — R52 PAIN, UNSPECIFIED: ICD-10-CM

## 2024-09-19 DIAGNOSIS — M13.0 POLYARTHRITIS, UNSPECIFIED: ICD-10-CM

## 2024-09-19 DIAGNOSIS — R53.83 OTHER FATIGUE: ICD-10-CM

## 2024-09-19 DIAGNOSIS — L93.2 OTHER LOCAL LUPUS ERYTHEMATOSUS: ICD-10-CM

## 2024-09-19 DIAGNOSIS — L65.9 NONSCARRING HAIR LOSS, UNSPECIFIED: ICD-10-CM

## 2024-09-19 DIAGNOSIS — R68.2 DRY MOUTH, UNSPECIFIED: ICD-10-CM

## 2024-09-19 DIAGNOSIS — M79.7 FIBROMYALGIA: ICD-10-CM

## 2024-09-19 PROCEDURE — 99204 OFFICE O/P NEW MOD 45 MIN: CPT

## 2024-09-19 PROCEDURE — G2211 COMPLEX E/M VISIT ADD ON: CPT

## 2024-09-19 NOTE — ASSESSMENT
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  No obvious signs/symptoms of SLE at this time.  No current lesions on skin   - If lesions begin to recure and/or if evidence of SLE, will plan to likely start Plaquenil. 2)  Diffuse pain, persistent fatigue:  most c/w fibromyalgia.  W/u for other etiologies negative.   - Reiterated importance of exercise   - Started Lyrica 75mg BID.   - Cont cyclobenzaprine 10mg qhs. 3)  Polyarticular OA, rita hands, shoulders, knees:   - Reiterated importance of exercise   - Cont Tylenol prn (pt unable to take NSAID's as she is on Plavix).   - heating pads or ice packs   - OTC topical analgesics 4)  Left knee pain s/p recent fall:  pt underwent MRI yesterday - results not yet available. 5)  Mild proteinuria:  unclear etiology.  Unlikely SLE, as dsDNA, C3, C4 all WNL.   - Awaiting repeat UA, U Pr/Cr.

## 2024-09-19 NOTE — HISTORY OF PRESENT ILLNESS
[Arthralgias] : arthralgias [Myalgias] : myalgias [FreeTextEntry1] : 9/19/2024:  Continues to experience diffuse pain and persistent fatigue, unchanged.  She had called about it 2 days ago and I prescribed Lyrica, which she started taking yesterday.  Still w/ left knee, s/p a recent fall for which she is following w/ orthopedics.  No new complaints. 9/5/2024:  Feeling "the same" overall.  Still w/ diffuse pain, unchanged.  Also still w/ joint pains, including her hands, shoulders, and knees.  She also c/o left knee pain s/p a recent fall. She underwent an MRI yesterday - results not yet available. [Anorexia] : no anorexia [Weight Loss] : no weight loss [Malaise] : no malaise [Fever] : no fever [Chills] : no chills [Fatigue] : no fatigue [Malar Facial Rash] : no malar facial rash [Skin Lesions] : no lesions [Skin Nodules] : no skin nodules [Oral Ulcers] : no oral ulcers [Cough] : no cough [Dry Mouth] : no dry mouth [Dysphonia] : no dysphonia [Dysphagia] : no dysphagia [Shortness of Breath] : no shortness of breath [Chest Pain] : no chest pain [Joint Swelling] : no joint swelling [Joint Warmth] : no joint warmth [Joint Deformity] : no joint deformity [Decreased ROM] : no decreased range of motion [Morning Stiffness] : no morning stiffness [Falls] : no falls [Difficulty Standing] : no difficulty standing [Difficulty Walking] : no difficulty walking [Dyspnea] : no dyspnea [Muscle Weakness] : no muscle weakness [Muscle Spasms] : no muscle spasms [Muscle Cramping] : no muscle cramping [Visual Changes] : no visual changes [Eye Pain] : no eye pain [Eye Redness] : no eye redness [Dry Eyes] : no dry eyes

## 2024-09-19 NOTE — REASON FOR VISIT
Chief Complaint   Patient presents with    Welcome To Medicare    Follow-up     Hypertension, Hyperlipidemia, Hypothyroidism and labs    Nail Problem    Snoring    Foot Problem     Kiara Ordaz is a 62 y.o. female here for Welcome to Medicare Visit.     Patient is here for follow-up on hypertension and hyperlipidemia. Patient denies chest pain, dyspnea, exertional chest pressure/discomfort, near-syncope, orthopnea, palpitations, paroxysmal nocturnal dyspnea, and syncope. Taking her medication regularly with no side effects.    She presents for follow up of hypothyroidism. Current symptoms: heat intolerance. Patient denies change in energy level, diarrhea, nervousness, palpitations, and weight changes.     She complains of possible neuropathy of feet. She describes symptoms of numbness and tingling,hypersensitivity of the bottom of both feet. Onset of symptoms was sudden, not related to any specific activity. Symptoms are currently of moderate severity. Symptoms occur all day and last days. She denies burning and cramping. Toes are curling at top of knuckles.     She has some creases in her fingernails, and the tips of her nails are cracking in the middle and grows side to side, mainly the ring fingers on both hands, developed over the past 6 months.    She has noticed some skin discoloration with in the last few years. She is concerned of cancer, right side face, upper chest and left shoulder. She would like a referral to a Dermatologist for skin evaluation.    She complains of snoring, periods of not breathing, tossing and turning, kicking, awakening in the middle of the night because of urination.  Symptoms began several years ago, unchanged since that time. She denies uncomfortable room temperature, uncomfortable bedding. Previous evaluation and treatment has included C-PAP trial. She does not have a cpap machine, they have no been able to find a mask that works with her.    Past Medical, Surgical, and Family  "History reviewed and updated in chart.    Reviewed all medications by prescribing practitioner or clinical pharmacist (such as prescriptions, OTCs, herbal therapies and supplements) and documented in the medical record.    Patient Self Assessment of Health Status  Patient Self Assessment: Fair    Nutrition and Exercise  Current Diet: Well Balanced Diet  Adequate Fluid Intake: Yes  Caffeine: Yes  Exercise Frequency: No Exercise    Functional Ability/Level of Safety  Cognitive Impairment Observed: No cognitive impairment observed  Cognitive Impairment Reported: No cognitive impairment reported by patient or family    Home Safety Risk Factors: None    Review of Systems - General ROS: negative for - fever, malaise, or weight loss  Psychological ROS: negative for - anxiety, concentration difficulties, depression, or memory difficulties  ENT ROS: negative for - hearing change, nasal discharge, oral lesions, sinus pain, sore throat, tinnitus or vertigo  Allergy and Immunology ROS: negative for - hives, nasal congestion or seasonal allergies  Hematological and Lymphatic ROS: negative for - bruising, fatigue, or pallor  Endocrine ROS: negative for - malaise/lethargy, palpitations, or unexpected weight changes  Respiratory ROS: negative for - cough, hemoptysis, pleuritic pain, shortness of breath or wheezing  Cardiovascular ROS: no chest pain or dyspnea on exertion  Gastrointestinal ROS: no abdominal pain, change in bowel habits, or black or bloody stools  Genito-Urinary ROS: no dysuria, trouble voiding, or hematuria  Neurological ROS: negative for - dizziness, gait disturbance, headaches, impaired coordination/balance, tremors or visual changes    Objective   Vitals:  /64   Pulse 61   Temp 36.2 °C (97.1 °F)   Resp 15   Ht 1.626 m (5' 4\")   Wt 110 kg (242 lb 6.4 oz)   SpO2 97%   BMI 41.61 kg/m²       Physical Examination: General appearance - alert, well appearing, and in no distress  Mental status - alert, " "oriented to person, place, and time  Eyes - pupils equal and reactive, extraocular eye movements intact  Ears - bilateral TM's and external ear canals normal  Mouth - mucous membranes moist, pharynx normal without lesions  Neck - supple, no significant adenopathy  Lymphatics - no palpable lymphadenopathy, no hepatosplenomegaly  Chest - clear to auscultation, no wheezes, rales or rhonchi, symmetric air entry  Heart - normal rate, regular rhythm, normal S1, S2, no murmurs, rubs, clicks or gallops  Abdomen - soft, nontender, nondistended, no masses or organomegaly  Neurological - alert, oriented, normal speech, no focal findings or movement disorder noted  Extremities: peripheral pulses normal, no pedal edema, no clubbing or cyanosis.    Assessment/Plan     Problem List Items Addressed This Visit       Essential hypertension    Current Assessment & Plan     Well-controlled, continue current medications and management.  Dietary sodium restriction.  Regular aerobic exercise program is recommended to help achieve and maintain normal body weight, fitness and improve lipid balance. .  Dietary changes: Increase soluble fiber  Plant sterols 2grams per day (e.g. Benecol)  Reduce saturated fat, \"trans\" monounsaturated fatty acids, and cholesterol           Relevant Orders    Comprehensive Metabolic Panel    Lipid Panel    Follow Up In [facility] - PCP - Established    Hammer toe of right foot    Current Assessment & Plan     We will refer her to the Podiatrist for further evaluation.         Relevant Orders    Referral to Podiatry    Hypothyroidism    Current Assessment & Plan     We will reduce the dose of the Levothyroxine to 125 mcg daily and recheck her thyroid function in 3 months.         Relevant Medications    levothyroxine (Synthroid, Levoxyl) 125 mcg tablet    Other Relevant Orders    Thyroid Stimulating Hormone    Thyroxine, Free    Follow Up In [facility] - PCP - Established    Mixed " hyperlipidemia    Current Assessment & Plan     Well-controlled, continue current medications and management.           Relevant Orders    Comprehensive Metabolic Panel    Lipid Panel    Follow Up In [facility] - PCP - Established    Morbid obesity with BMI of 40.0-44.9, adult (CMS/[facility])    Current Assessment & Plan     Continue decrease calorie diet and not more than 1500 calorie per day diet and low-fat diet.  Continue with regular exercise program.  We advised exercise at least 5 days a week for at least 45 minutes and also a minimum of 10,000 steps a day.  The detrimental effects of obesity on health were discussed.           Multiple nevi    Current Assessment & Plan     We will refer her to the Dermatologist for further evaluation.         Relevant Orders    Referral to Dermatology    Numbness and tingling of both feet    Current Assessment & Plan     EMG ordered.         Relevant Orders    EMG & nerve conduction    Vitamin B12    Folate    Obstructive sleep apnea on CPAP    Current Assessment & Plan     We will refer her to the Sleep Specialist for further evaluation.         Relevant Orders    Referral to Pulmonology    Welcome to Medicare preventive visit - Primary     Other Visit Diagnoses       Need for influenza vaccination        Relevant Orders    Flu vaccine (IIV4) age 6 months and greater, preservative free (Completed)          Scribe Attestation  By signing my name below, ICarl, Cydneyibrafael   attest that this documentation has been prepared under the direction and in the presence of Niall Miranda MD.   [Follow-Up: _____] : a [unfilled] follow-up visit

## 2024-09-20 ENCOUNTER — NON-APPOINTMENT (OUTPATIENT)
Age: 49
End: 2024-09-20

## 2024-09-20 RX ORDER — PREGABALIN 75 MG/1
75 CAPSULE ORAL
Qty: 60 | Refills: 3 | Status: DISCONTINUED | COMMUNITY
Start: 2024-09-17 | End: 2024-09-20

## 2024-09-20 RX ORDER — GABAPENTIN 100 MG/1
100 CAPSULE ORAL 3 TIMES DAILY
Qty: 90 | Refills: 2 | Status: ACTIVE | COMMUNITY
Start: 2024-09-20 | End: 1900-01-01

## 2024-09-20 NOTE — HISTORY OF PRESENT ILLNESS
[de-identified] : 49-year-old female known to our practice for over 10 years.  States she slipped and fell yesterday onto her bent left knee and thigh.  She now has focal left knee pain focal left ankle pain for which she has an appointment tomorrow [facility].  She is also experiencing low back pain at this time.  She has had history of neck and back pain history of fibromyalgia history of lupus. He has a longstanding history of back and neck pain wanted to resume physical therapy for the pain that she has been having intermittently.  Pain is described as a dull ache with a acute exacerbations intermittently that is sharp and stabbing.  Pain rates 5 out of 10 on a regular basis.  Pain is not particularly radiating down the arms but after her slip and fall yesterday there is focal right knee and right ankle pain.  She is limping and using a cane for ambulation.  He is having difficulty weightbearing on her left lower extremity.

## 2024-09-20 NOTE — HISTORY OF PRESENT ILLNESS
[de-identified] : 49-year-old female known to our practice for over 10 years.  States she slipped and fell yesterday onto her bent left knee and thigh.  She now has focal left knee pain focal left ankle pain for which she has an appointment tomorrow [facility].  She is also experiencing low back pain at this time.  She has had history of neck and back pain history of fibromyalgia history of lupus. He has a longstanding history of back and neck pain wanted to resume physical therapy for the pain that she has been having intermittently.  Pain is described as a dull ache with a acute exacerbations intermittently that is sharp and stabbing.  Pain rates 5 out of 10 on a regular basis.  Pain is not particularly radiating down the arms but after her slip and fall yesterday there is focal right knee and right ankle pain.  She is limping and using a cane for ambulation.  He is having difficulty weightbearing on her left lower extremity.

## 2024-09-20 NOTE — PHYSICAL EXAM
[de-identified] : CONSTITUTIONAL: The patient is a very pleasant individual who is well-nourished and who appears stated age. PSYCHIATRIC: The patient is alert and oriented X 3 and in no apparent distress, and participates with orthopedic evaluation well. HEAD: Atraumatic and is nonsyndromic in appearance. EENT: No visible thyromegaly, EOMI. RESPIRATORY: Respiratory rate is regular, not dyspneic on examination. LYMPHATICS: There is no inguinal lymphadenopathy INTEGUMENTARY: Skin is clean, dry, and intact about the bilateral lower extremities and lumbar spine. VASCULAR: There is brisk capillary refill about the bilateral lower extremities. NEUROLOGIC: There are no pathologic reflexes. There is no decrease in sensation of the bilateral lower extremities on manual examination. Deep tendon reflexes are well maintained at 2+/4 of the bilateral lower extremities and are symmetric.. MUSCULOSKELETAL: There is no visible muscular atrophy. Manual motor strength is well maintained in the bilateral lower extremities. Range of motion of lumbar spine is well maintained. The patient ambulates in a non-myelopathic manner. Negative tension sign and straight leg raise bilaterally. Quad extension, ankle dorsiflexion, EHL, plantar flexion, and ankle eversion are well preserved. Normal secondary orthopaedic exam of bilateral hips, greater trochanteric area,  Patient is able to ambulate with the assistance of a cane.  She has focal left knee and left ankle pain.  She has back pain on flexion and extension tension, mechanical low back pain.  Knee has mild swelling, tenderness at the joint line medial and lateral and patient is having difficulty weightbearing on the left without pain. [de-identified] : AP lateral x-ray of the lumbar spine done today in the office on the date of September 10, 2024 demonstrates a well-maintained lumbar lordosis well-maintained intervertebral disc spaces and no acute fracture noted.

## 2024-09-20 NOTE — DISCUSSION/SUMMARY
[de-identified] : 30 minutes was spent reviewing the x-rays as well as discussing with the patient their clinical presentation, diagnosis and providing education.  Conservative treatment was discussed with the patient at length. Anticipatory guidance regarding disease process, back strain and sprain status post fall, left knee pain left ankle pain status post fall avoidance of acute exacerbation this was discussed at length and all patients commenting concerns were answered to the patient's satisfaction. Physical therapy for decrease pain and increase function was ordered. Patient was given home exercises as approved by [employer] and works well held directed toward this particular process. Intermittent use of acetaminophen 500 mg 2 tablets t.i.d. p.r.n. mild to moderate pain, ibuprofen 600 mg t.i.d. p.r.n. severe pain with food or milk if there is no medical contraindication. Home exercise including stretching on a daily basis for 20-30 minutes was recommended. Heat, ice, topical were discussed as needed. The patient will followup in 6-8 weeks at which point in time if symptoms continue we will order lumbar MRI studies to guide treatment plan including possible injection therapy with pain management versus surgical option.  Guarding knee and ankle pain on the left she will follow-up with [facility] tomorrow as planned.  Addendum 9/20/24: Patient calls the office stating that she fell 8 times within the last month and that her knee is giving out.  She recently saw her knee specialty here at [facility] and the patient was told that there is no significant finding that would cause repeated falls.  She states her back hurts after her falls and that back and neck pain now rates 9/10 on the pain scale and she is having difficulty with activities of daily living including washing her hair, showering, dressing and preparing meals.  Upon chart review I see that she last visited cardiology in the spring, stress test was ordered but patient did not want to comply with it.  Patient had reported chest pain on exertion.  She also did report tingling and numbness to neurologist recently and had a brain MRI.  I think she would be best served to go to urgent care to see why she is falling repeatedly and make sure that there is no serious reason for her falling.  We did examine her only a week and a half ago and found that she had some back sprain and strain.  I did call her back discussed for her to go to her primary care or walk-in urgent care for pain control which we will not be doing since she has multiple medical comorbidities and takes multiple medications which are contraindicated for pain medications that we can be giving from our office.  We would like her to be seen at urgent care to make sure there is no cardiac or neurologic emergency causing repeated falls, in light of her medical comorbidities. Due to severe ataxia, tingling and numbness of hands, frequent falls and neck pain, we will order MRI cervical, thoracic and lumbar to see if there is a spine related etiology.  Patient will follow up as planned here next week.

## 2024-09-20 NOTE — DISCUSSION/SUMMARY
[de-identified] : 30 minutes was spent reviewing the x-rays as well as discussing with the patient their clinical presentation, diagnosis and providing education.  Conservative treatment was discussed with the patient at length. Anticipatory guidance regarding disease process, back strain and sprain status post fall, left knee pain left ankle pain status post fall avoidance of acute exacerbation this was discussed at length and all patients commenting concerns were answered to the patient's satisfaction. Physical therapy for decrease pain and increase function was ordered. Patient was given home exercises as approved by [employer] and works well held directed toward this particular process. Intermittent use of acetaminophen 500 mg 2 tablets t.i.d. p.r.n. mild to moderate pain, ibuprofen 600 mg t.i.d. p.r.n. severe pain with food or milk if there is no medical contraindication. Home exercise including stretching on a daily basis for 20-30 minutes was recommended. Heat, ice, topical were discussed as needed. The patient will followup in 6-8 weeks at which point in time if symptoms continue we will order lumbar MRI studies to guide treatment plan including possible injection therapy with pain management versus surgical option.  Guarding knee and ankle pain on the left she will follow-up with [facility] tomorrow as planned.  Addendum 9/20/24: Patient calls the office stating that she fell 8 times within the last month and that her knee is giving out.  She recently saw her knee specialty here at [facility] and the patient was told that there is no significant finding that would cause repeated falls.  She states her back hurts after her falls and that back and neck pain now rates 9/10 on the pain scale and she is having difficulty with activities of daily living including washing her hair, showering, dressing and preparing meals.  Upon chart review I see that she last visited cardiology in the spring, stress test was ordered but patient did not want to comply with it.  Patient had reported chest pain on exertion.  She also did report tingling and numbness to neurologist recently and had a brain MRI.  I think she would be best served to go to urgent care to see why she is falling repeatedly and make sure that there is no serious reason for her falling.  We did examine her only a week and a half ago and found that she had some back sprain and strain.  I did call her back discussed for her to go to her primary care or walk-in urgent care for pain control which we will not be doing since she has multiple medical comorbidities and takes multiple medications which are contraindicated for pain medications that we can be giving from our office.  We would like her to be seen at urgent care to make sure there is no cardiac or neurologic emergency causing repeated falls, in light of her medical comorbidities. Due to severe ataxia, tingling and numbness of hands, frequent falls and neck pain, we will order MRI cervical, thoracic and lumbar to see if there is a spine related etiology.  Patient will follow up as planned here next week.

## 2024-09-20 NOTE — PHYSICAL EXAM
[de-identified] : CONSTITUTIONAL: The patient is a very pleasant individual who is well-nourished and who appears stated age. PSYCHIATRIC: The patient is alert and oriented X 3 and in no apparent distress, and participates with orthopedic evaluation well. HEAD: Atraumatic and is nonsyndromic in appearance. EENT: No visible thyromegaly, EOMI. RESPIRATORY: Respiratory rate is regular, not dyspneic on examination. LYMPHATICS: There is no inguinal lymphadenopathy INTEGUMENTARY: Skin is clean, dry, and intact about the bilateral lower extremities and lumbar spine. VASCULAR: There is brisk capillary refill about the bilateral lower extremities. NEUROLOGIC: There are no pathologic reflexes. There is no decrease in sensation of the bilateral lower extremities on manual examination. Deep tendon reflexes are well maintained at 2+/4 of the bilateral lower extremities and are symmetric.. MUSCULOSKELETAL: There is no visible muscular atrophy. Manual motor strength is well maintained in the bilateral lower extremities. Range of motion of lumbar spine is well maintained. The patient ambulates in a non-myelopathic manner. Negative tension sign and straight leg raise bilaterally. Quad extension, ankle dorsiflexion, EHL, plantar flexion, and ankle eversion are well preserved. Normal secondary orthopaedic exam of bilateral hips, greater trochanteric area,  Patient is able to ambulate with the assistance of a cane.  She has focal left knee and left ankle pain.  She has back pain on flexion and extension tension, mechanical low back pain.  Knee has mild swelling, tenderness at the joint line medial and lateral and patient is having difficulty weightbearing on the left without pain. [de-identified] : AP lateral x-ray of the lumbar spine done today in the office on the date of September 10, 2024 demonstrates a well-maintained lumbar lordosis well-maintained intervertebral disc spaces and no acute fracture noted.

## 2024-09-25 ENCOUNTER — APPOINTMENT (OUTPATIENT)
Dept: ORTHOPEDIC SURGERY | Facility: CLINIC | Age: 49
End: 2024-09-25

## 2024-10-01 ENCOUNTER — APPOINTMENT (OUTPATIENT)
Dept: NEUROLOGY | Facility: CLINIC | Age: 49
End: 2024-10-01
Payer: MEDICAID

## 2024-10-01 PROCEDURE — 95886 MUSC TEST DONE W/N TEST COMP: CPT

## 2024-10-01 PROCEDURE — 95912 NRV CNDJ TEST 11-12 STUDIES: CPT

## 2024-10-03 ENCOUNTER — APPOINTMENT (OUTPATIENT)
Dept: MRI IMAGING | Facility: CLINIC | Age: 49
End: 2024-10-03

## 2024-10-03 PROCEDURE — 72141 MRI NECK SPINE W/O DYE: CPT | Mod: 26

## 2024-10-03 PROCEDURE — 72146 MRI CHEST SPINE W/O DYE: CPT | Mod: 26

## 2024-10-03 PROCEDURE — 72148 MRI LUMBAR SPINE W/O DYE: CPT | Mod: 26

## 2024-10-10 ENCOUNTER — APPOINTMENT (OUTPATIENT)
Dept: ORTHOPEDIC SURGERY | Facility: CLINIC | Age: 49
End: 2024-10-10
Payer: MEDICARE

## 2024-10-10 ENCOUNTER — APPOINTMENT (OUTPATIENT)
Dept: OBGYN | Facility: CLINIC | Age: 49
End: 2024-10-10

## 2024-10-10 VITALS
SYSTOLIC BLOOD PRESSURE: 122 MMHG | WEIGHT: 154.5 LBS | BODY MASS INDEX: 27.38 KG/M2 | HEIGHT: 63 IN | DIASTOLIC BLOOD PRESSURE: 80 MMHG

## 2024-10-10 VITALS
BODY MASS INDEX: 27.38 KG/M2 | HEART RATE: 80 BPM | SYSTOLIC BLOOD PRESSURE: 117 MMHG | DIASTOLIC BLOOD PRESSURE: 81 MMHG | HEIGHT: 63 IN | WEIGHT: 154.5 LBS

## 2024-10-10 DIAGNOSIS — Z01.419 ENCOUNTER FOR GYNECOLOGICAL EXAMINATION (GENERAL) (ROUTINE) W/OUT ABNORMAL FINDINGS: ICD-10-CM

## 2024-10-10 DIAGNOSIS — M54.2 CERVICALGIA: ICD-10-CM

## 2024-10-10 DIAGNOSIS — N90.5 ATROPHY OF VULVA: ICD-10-CM

## 2024-10-10 DIAGNOSIS — M54.50 LOW BACK PAIN, UNSPECIFIED: ICD-10-CM

## 2024-10-10 DIAGNOSIS — N95.2 POSTMENOPAUSAL ATROPHIC VAGINITIS: ICD-10-CM

## 2024-10-10 DIAGNOSIS — N90.89 OTHER SPECIFIED NONINFLAMMATORY DISORDERS OF VULVA AND PERINEUM: ICD-10-CM

## 2024-10-10 PROCEDURE — 72040 X-RAY EXAM NECK SPINE 2-3 VW: CPT

## 2024-10-10 PROCEDURE — 99204 OFFICE O/P NEW MOD 45 MIN: CPT

## 2024-10-10 PROCEDURE — 99213 OFFICE O/P EST LOW 20 MIN: CPT | Mod: 24

## 2024-10-12 ENCOUNTER — NON-APPOINTMENT (OUTPATIENT)
Age: 49
End: 2024-10-12

## 2024-10-12 ENCOUNTER — EMERGENCY (EMERGENCY)
Facility: HOSPITAL | Age: 49
LOS: 1 days | Discharge: HOME | End: 2024-10-12
Attending: EMERGENCY MEDICINE
Payer: MEDICAID

## 2024-10-12 VITALS
WEIGHT: 159.84 LBS | HEIGHT: 63 IN | HEART RATE: 66 BPM | DIASTOLIC BLOOD PRESSURE: 84 MMHG | TEMPERATURE: 98 F | OXYGEN SATURATION: 99 % | RESPIRATION RATE: 18 BRPM | SYSTOLIC BLOOD PRESSURE: 131 MMHG

## 2024-10-12 DIAGNOSIS — Z90.721 ACQUIRED ABSENCE OF OVARIES, UNILATERAL: Chronic | ICD-10-CM

## 2024-10-12 DIAGNOSIS — Z30.8 ENCOUNTER FOR OTHER CONTRACEPTIVE MANAGEMENT: Chronic | ICD-10-CM

## 2024-10-12 DIAGNOSIS — Z90.89 ACQUIRED ABSENCE OF OTHER ORGANS: Chronic | ICD-10-CM

## 2024-10-12 PROCEDURE — 99283 EMERGENCY DEPT VISIT LOW MDM: CPT

## 2024-10-12 PROCEDURE — 99282 EMERGENCY DEPT VISIT SF MDM: CPT

## 2024-10-16 ENCOUNTER — APPOINTMENT (OUTPATIENT)
Dept: CARDIOLOGY | Facility: CLINIC | Age: 49
End: 2024-10-16
Payer: MEDICAID

## 2024-10-16 VITALS
WEIGHT: 150 LBS | BODY MASS INDEX: 26.58 KG/M2 | HEIGHT: 63 IN | DIASTOLIC BLOOD PRESSURE: 63 MMHG | OXYGEN SATURATION: 99 % | SYSTOLIC BLOOD PRESSURE: 93 MMHG | HEART RATE: 88 BPM

## 2024-10-16 VITALS — SYSTOLIC BLOOD PRESSURE: 100 MMHG | DIASTOLIC BLOOD PRESSURE: 60 MMHG

## 2024-10-16 DIAGNOSIS — F17.210 NICOTINE DEPENDENCE, CIGARETTES, UNCOMPLICATED: ICD-10-CM

## 2024-10-16 DIAGNOSIS — I25.10 ATHEROSCLEROTIC HEART DISEASE OF NATIVE CORONARY ARTERY W/OUT ANGINA PECTORIS: ICD-10-CM

## 2024-10-16 DIAGNOSIS — R06.09 OTHER FORMS OF DYSPNEA: ICD-10-CM

## 2024-10-16 PROCEDURE — 93000 ELECTROCARDIOGRAM COMPLETE: CPT

## 2024-10-16 PROCEDURE — 99214 OFFICE O/P EST MOD 30 MIN: CPT | Mod: 25

## 2024-10-21 ENCOUNTER — APPOINTMENT (OUTPATIENT)
Dept: NEUROLOGY | Facility: CLINIC | Age: 49
End: 2024-10-21

## 2024-10-25 ENCOUNTER — APPOINTMENT (OUTPATIENT)
Dept: PHYSICAL MEDICINE AND REHAB | Facility: CLINIC | Age: 49
End: 2024-10-25

## 2024-10-29 ENCOUNTER — APPOINTMENT (OUTPATIENT)
Dept: CARDIOLOGY | Facility: CLINIC | Age: 49
End: 2024-10-29

## 2024-11-03 ENCOUNTER — NON-APPOINTMENT (OUTPATIENT)
Age: 49
End: 2024-11-03

## 2024-11-05 ENCOUNTER — APPOINTMENT (OUTPATIENT)
Dept: CARDIOLOGY | Facility: CLINIC | Age: 49
End: 2024-11-05

## 2024-11-19 ENCOUNTER — APPOINTMENT (OUTPATIENT)
Dept: PULMONOLOGY | Facility: CLINIC | Age: 49
End: 2024-11-19

## 2024-11-22 ENCOUNTER — APPOINTMENT (OUTPATIENT)
Dept: CARDIOLOGY | Facility: CLINIC | Age: 49
End: 2024-11-22

## 2024-11-29 ENCOUNTER — APPOINTMENT (OUTPATIENT)
Dept: CARDIOLOGY | Facility: CLINIC | Age: 49
End: 2024-11-29

## 2024-12-17 ENCOUNTER — APPOINTMENT (OUTPATIENT)
Dept: CARDIOLOGY | Facility: CLINIC | Age: 49
End: 2024-12-17

## 2024-12-23 ENCOUNTER — APPOINTMENT (OUTPATIENT)
Dept: PULMONOLOGY | Facility: CLINIC | Age: 49
End: 2024-12-23

## 2024-12-23 ENCOUNTER — APPOINTMENT (OUTPATIENT)
Dept: RHEUMATOLOGY | Facility: CLINIC | Age: 49
End: 2024-12-23
Payer: MEDICAID

## 2024-12-23 ENCOUNTER — APPOINTMENT (OUTPATIENT)
Dept: RHEUMATOLOGY | Facility: CLINIC | Age: 49
End: 2024-12-23

## 2024-12-23 VITALS
WEIGHT: 149 LBS | BODY MASS INDEX: 26.4 KG/M2 | TEMPERATURE: 98 F | HEIGHT: 63 IN | DIASTOLIC BLOOD PRESSURE: 68 MMHG | RESPIRATION RATE: 17 BRPM | SYSTOLIC BLOOD PRESSURE: 116 MMHG | HEART RATE: 96 BPM | OXYGEN SATURATION: 98 %

## 2024-12-23 DIAGNOSIS — L65.9 NONSCARRING HAIR LOSS, UNSPECIFIED: ICD-10-CM

## 2024-12-23 DIAGNOSIS — R68.2 DRY MOUTH, UNSPECIFIED: ICD-10-CM

## 2024-12-23 DIAGNOSIS — L93.2 OTHER LOCAL LUPUS ERYTHEMATOSUS: ICD-10-CM

## 2024-12-23 DIAGNOSIS — G56.01 CARPAL TUNNEL SYNDROME, RIGHT UPPER LIMB: ICD-10-CM

## 2024-12-23 DIAGNOSIS — M79.7 FIBROMYALGIA: ICD-10-CM

## 2024-12-23 DIAGNOSIS — G56.03 CARPAL TUNNEL SYNDROM,BILATERAL UPPER LIMBS: ICD-10-CM

## 2024-12-23 DIAGNOSIS — R52 PAIN, UNSPECIFIED: ICD-10-CM

## 2024-12-23 DIAGNOSIS — R80.9 PROTEINURIA, UNSPECIFIED: ICD-10-CM

## 2024-12-23 DIAGNOSIS — R53.83 OTHER FATIGUE: ICD-10-CM

## 2024-12-23 DIAGNOSIS — M13.0 POLYARTHRITIS, UNSPECIFIED: ICD-10-CM

## 2024-12-23 PROCEDURE — 20526 THER INJECTION CARP TUNNEL: CPT | Mod: 50

## 2024-12-23 PROCEDURE — 99214 OFFICE O/P EST MOD 30 MIN: CPT | Mod: 25

## 2024-12-23 RX ORDER — PREGABALIN 150 MG/1
150 CAPSULE ORAL
Qty: 60 | Refills: 3 | Status: ACTIVE | COMMUNITY
Start: 2024-12-23 | End: 1900-01-01

## 2024-12-23 RX ORDER — METHYLPRED ACET/NACL,ISO-OS/PF 40 MG/ML
40 VIAL (ML) INJECTION
Refills: 0 | Status: COMPLETED | OUTPATIENT
Start: 2024-12-23

## 2024-12-23 RX ADMIN — METHYLPREDNISOLONE ACETATE MG/ML: 40 INJECTION, SUSPENSION INTRA-ARTICULAR; INTRALESIONAL; INTRAMUSCULAR; SOFT TISSUE at 00:00

## 2025-01-21 ENCOUNTER — APPOINTMENT (OUTPATIENT)
Dept: CARDIOLOGY | Facility: CLINIC | Age: 50
End: 2025-01-21

## 2025-02-11 DIAGNOSIS — M25.551 PAIN IN RIGHT HIP: ICD-10-CM

## 2025-02-12 ENCOUNTER — APPOINTMENT (OUTPATIENT)
Dept: ORTHOPEDIC SURGERY | Facility: CLINIC | Age: 50
End: 2025-02-12

## 2025-04-15 ENCOUNTER — APPOINTMENT (OUTPATIENT)
Dept: OBGYN | Facility: CLINIC | Age: 50
End: 2025-04-15
Payer: MEDICARE

## 2025-04-15 VITALS
WEIGHT: 164 LBS | SYSTOLIC BLOOD PRESSURE: 118 MMHG | BODY MASS INDEX: 28 KG/M2 | DIASTOLIC BLOOD PRESSURE: 76 MMHG | HEIGHT: 64 IN

## 2025-04-15 DIAGNOSIS — N95.0 POSTMENOPAUSAL BLEEDING: ICD-10-CM

## 2025-04-15 DIAGNOSIS — Z11.3 ENCOUNTER FOR SCREENING FOR INFECTIONS WITH A PREDOMINANTLY SEXUAL MODE OF TRANSMISSION: ICD-10-CM

## 2025-04-15 PROCEDURE — 99203 OFFICE O/P NEW LOW 30 MIN: CPT | Mod: 25

## 2025-04-15 PROCEDURE — 36415 COLL VENOUS BLD VENIPUNCTURE: CPT

## 2025-04-15 PROCEDURE — 99459 PELVIC EXAMINATION: CPT

## 2025-04-16 LAB
C TRACH RRNA SPEC QL NAA+PROBE: NOT DETECTED
N GONORRHOEA RRNA SPEC QL NAA+PROBE: NOT DETECTED
SOURCE AMPLIFICATION: NORMAL

## 2025-04-22 ENCOUNTER — APPOINTMENT (OUTPATIENT)
Dept: RHEUMATOLOGY | Facility: CLINIC | Age: 50
End: 2025-04-22

## 2025-04-29 ENCOUNTER — APPOINTMENT (OUTPATIENT)
Dept: OBGYN | Facility: CLINIC | Age: 50
End: 2025-04-29
Payer: MEDICARE

## 2025-04-29 VITALS
SYSTOLIC BLOOD PRESSURE: 124 MMHG | DIASTOLIC BLOOD PRESSURE: 80 MMHG | WEIGHT: 159.13 LBS | HEIGHT: 64 IN | BODY MASS INDEX: 27.17 KG/M2

## 2025-04-29 DIAGNOSIS — Z87.42 PERSONAL HISTORY OF OTHER DISEASES OF THE FEMALE GENITAL TRACT: ICD-10-CM

## 2025-04-29 DIAGNOSIS — N95.0 POSTMENOPAUSAL BLEEDING: ICD-10-CM

## 2025-04-29 PROCEDURE — 99213 OFFICE O/P EST LOW 20 MIN: CPT

## 2025-04-29 PROCEDURE — 99459 PELVIC EXAMINATION: CPT

## 2025-04-30 ENCOUNTER — NON-APPOINTMENT (OUTPATIENT)
Age: 50
End: 2025-04-30

## 2025-05-11 NOTE — ED PROVIDER NOTE - PHYSICAL EXAMINATION
Follow up with your pain management provider tomorrow as scheduled    Return for worsening symptoms or concerns.    General: In NAD, non-toxic/well-appearing; well nourished/developed.  Skin: Warm, dry, color normal for race. Multiple superficial scratches, ?puncture wounds to thenar eminence of right hand. +Swelling. No erythema, drainage, or bleeding.   Head: Normocephalic/atraumatic.   Eyes: Sclera anicteric, conjunctivae clear b/l. PERRLA, EOMI.   Neck: Supple, FROM.   Cardio: Rate and rhythm regular. No audible murmur, gallop, or rub.  PV: Pulses: b/l 2+ radial. Capillary refill <2 seconds.  Resp: Normal AP to lateral diameter, symmetrical excursion b/l. Breath sounds vesicular, symmetrical and without rales, rhonchi or wheezing b/l.  MSK: MAEx4. FROM. Able to oppose all digits.   Neuro: A&Ox3. No motor sensory deficits.

## 2025-06-11 ENCOUNTER — NON-APPOINTMENT (OUTPATIENT)
Age: 50
End: 2025-06-11

## 2025-06-13 ENCOUNTER — APPOINTMENT (OUTPATIENT)
Dept: CARDIOLOGY | Facility: CLINIC | Age: 50
End: 2025-06-13
Payer: MEDICARE

## 2025-06-13 ENCOUNTER — APPOINTMENT (OUTPATIENT)
Dept: OBGYN | Facility: CLINIC | Age: 50
End: 2025-06-13

## 2025-06-13 VITALS — HEART RATE: 89 BPM

## 2025-06-13 VITALS
HEART RATE: 102 BPM | OXYGEN SATURATION: 99 % | HEIGHT: 64 IN | WEIGHT: 162 LBS | SYSTOLIC BLOOD PRESSURE: 103 MMHG | DIASTOLIC BLOOD PRESSURE: 71 MMHG | BODY MASS INDEX: 27.66 KG/M2

## 2025-06-13 PROCEDURE — 99214 OFFICE O/P EST MOD 30 MIN: CPT | Mod: 25

## 2025-06-13 PROCEDURE — 93000 ELECTROCARDIOGRAM COMPLETE: CPT | Mod: NC

## 2025-06-17 ENCOUNTER — APPOINTMENT (OUTPATIENT)
Dept: CARDIOLOGY | Facility: CLINIC | Age: 50
End: 2025-06-17
Payer: MEDICARE

## 2025-06-17 PROCEDURE — 93306 TTE W/DOPPLER COMPLETE: CPT

## 2025-06-17 RX ORDER — ALPRAZOLAM 0.5 MG/1
0.5 TABLET ORAL
Qty: 2 | Refills: 0 | Status: ACTIVE | COMMUNITY
Start: 2025-06-17 | End: 1900-01-01

## 2025-06-19 ENCOUNTER — EMERGENCY (EMERGENCY)
Facility: HOSPITAL | Age: 50
LOS: 1 days | End: 2025-06-19
Attending: STUDENT IN AN ORGANIZED HEALTH CARE EDUCATION/TRAINING PROGRAM
Payer: MEDICARE

## 2025-06-19 ENCOUNTER — APPOINTMENT (OUTPATIENT)
Dept: CARDIOLOGY | Facility: CLINIC | Age: 50
End: 2025-06-19

## 2025-06-19 VITALS
RESPIRATION RATE: 16 BRPM | TEMPERATURE: 97 F | DIASTOLIC BLOOD PRESSURE: 75 MMHG | HEIGHT: 63 IN | HEART RATE: 86 BPM | OXYGEN SATURATION: 99 % | SYSTOLIC BLOOD PRESSURE: 111 MMHG | WEIGHT: 169.09 LBS

## 2025-06-19 DIAGNOSIS — Z90.89 ACQUIRED ABSENCE OF OTHER ORGANS: Chronic | ICD-10-CM

## 2025-06-19 DIAGNOSIS — Z30.8 ENCOUNTER FOR OTHER CONTRACEPTIVE MANAGEMENT: Chronic | ICD-10-CM

## 2025-06-19 DIAGNOSIS — Z90.721 ACQUIRED ABSENCE OF OVARIES, UNILATERAL: Chronic | ICD-10-CM

## 2025-06-19 LAB
ALBUMIN SERPL ELPH-MCNC: 3.9 G/DL — SIGNIFICANT CHANGE UP (ref 3.3–5.2)
ALP SERPL-CCNC: 98 U/L — SIGNIFICANT CHANGE UP (ref 40–120)
ALT FLD-CCNC: 16 U/L — SIGNIFICANT CHANGE UP
ANION GAP SERPL CALC-SCNC: 12 MMOL/L — SIGNIFICANT CHANGE UP (ref 5–17)
APPEARANCE UR: CLEAR — SIGNIFICANT CHANGE UP
APTT BLD: 31.6 SEC — SIGNIFICANT CHANGE UP (ref 26.1–36.8)
AST SERPL-CCNC: 21 U/L — SIGNIFICANT CHANGE UP
BACTERIA # UR AUTO: NEGATIVE /HPF — SIGNIFICANT CHANGE UP
BASOPHILS # BLD AUTO: 0.05 K/UL — SIGNIFICANT CHANGE UP (ref 0–0.2)
BASOPHILS NFR BLD AUTO: 0.7 % — SIGNIFICANT CHANGE UP (ref 0–2)
BILIRUB SERPL-MCNC: <0.2 MG/DL — LOW (ref 0.4–2)
BILIRUB UR-MCNC: NEGATIVE — SIGNIFICANT CHANGE UP
BLD GP AB SCN SERPL QL: SIGNIFICANT CHANGE UP
BUN SERPL-MCNC: 10.6 MG/DL — SIGNIFICANT CHANGE UP (ref 8–20)
CALCIUM SERPL-MCNC: 8.4 MG/DL — SIGNIFICANT CHANGE UP (ref 8.4–10.5)
CAST: 0 /LPF — SIGNIFICANT CHANGE UP (ref 0–4)
CHLORIDE SERPL-SCNC: 103 MMOL/L — SIGNIFICANT CHANGE UP (ref 96–108)
CO2 SERPL-SCNC: 23 MMOL/L — SIGNIFICANT CHANGE UP (ref 22–29)
COLOR SPEC: YELLOW — SIGNIFICANT CHANGE UP
CREAT SERPL-MCNC: 0.71 MG/DL — SIGNIFICANT CHANGE UP (ref 0.5–1.3)
DIFF PNL FLD: ABNORMAL
EGFR: 102 ML/MIN/1.73M2 — SIGNIFICANT CHANGE UP
EGFR: 102 ML/MIN/1.73M2 — SIGNIFICANT CHANGE UP
EOSINOPHIL # BLD AUTO: 0.27 K/UL — SIGNIFICANT CHANGE UP (ref 0–0.5)
EOSINOPHIL NFR BLD AUTO: 3.9 % — SIGNIFICANT CHANGE UP (ref 0–6)
GLUCOSE SERPL-MCNC: 79 MG/DL — SIGNIFICANT CHANGE UP (ref 70–99)
GLUCOSE UR QL: NEGATIVE MG/DL — SIGNIFICANT CHANGE UP
HCT VFR BLD CALC: 40 % — SIGNIFICANT CHANGE UP (ref 34.5–45)
HGB BLD-MCNC: 13.5 G/DL — SIGNIFICANT CHANGE UP (ref 11.5–15.5)
IMM GRANULOCYTES # BLD AUTO: 0.02 K/UL — SIGNIFICANT CHANGE UP (ref 0–0.07)
IMM GRANULOCYTES NFR BLD AUTO: 0.3 % — SIGNIFICANT CHANGE UP (ref 0–0.9)
INR BLD: 0.92 RATIO — SIGNIFICANT CHANGE UP (ref 0.85–1.16)
KETONES UR QL: ABNORMAL MG/DL
LEUKOCYTE ESTERASE UR-ACNC: ABNORMAL
LYMPHOCYTES # BLD AUTO: 2.85 K/UL — SIGNIFICANT CHANGE UP (ref 1–3.3)
LYMPHOCYTES NFR BLD AUTO: 41.7 % — SIGNIFICANT CHANGE UP (ref 13–44)
MCHC RBC-ENTMCNC: 32.1 PG — SIGNIFICANT CHANGE UP (ref 27–34)
MCHC RBC-ENTMCNC: 33.8 G/DL — SIGNIFICANT CHANGE UP (ref 32–36)
MCV RBC AUTO: 95 FL — SIGNIFICANT CHANGE UP (ref 80–100)
MONOCYTES # BLD AUTO: 0.56 K/UL — SIGNIFICANT CHANGE UP (ref 0–0.9)
MONOCYTES NFR BLD AUTO: 8.2 % — SIGNIFICANT CHANGE UP (ref 2–14)
NEUTROPHILS # BLD AUTO: 3.09 K/UL — SIGNIFICANT CHANGE UP (ref 1.8–7.4)
NEUTROPHILS NFR BLD AUTO: 45.2 % — SIGNIFICANT CHANGE UP (ref 43–77)
NITRITE UR-MCNC: NEGATIVE — SIGNIFICANT CHANGE UP
NRBC # BLD AUTO: 0 K/UL — SIGNIFICANT CHANGE UP (ref 0–0)
NRBC # FLD: 0 K/UL — SIGNIFICANT CHANGE UP (ref 0–0)
NRBC BLD AUTO-RTO: 0 /100 WBCS — SIGNIFICANT CHANGE UP (ref 0–0)
PH UR: 6 — SIGNIFICANT CHANGE UP (ref 5–8)
PLATELET # BLD AUTO: 280 K/UL — SIGNIFICANT CHANGE UP (ref 150–400)
PMV BLD: 9.3 FL — SIGNIFICANT CHANGE UP (ref 7–13)
POTASSIUM SERPL-MCNC: 4.3 MMOL/L — SIGNIFICANT CHANGE UP (ref 3.5–5.3)
POTASSIUM SERPL-SCNC: 4.3 MMOL/L — SIGNIFICANT CHANGE UP (ref 3.5–5.3)
PROT SERPL-MCNC: 6.6 G/DL — SIGNIFICANT CHANGE UP (ref 6.6–8.7)
PROT UR-MCNC: NEGATIVE MG/DL — SIGNIFICANT CHANGE UP
PROTHROM AB SERPL-ACNC: 10.7 SEC — SIGNIFICANT CHANGE UP (ref 9.9–13.4)
RBC # BLD: 4.21 M/UL — SIGNIFICANT CHANGE UP (ref 3.8–5.2)
RBC # FLD: 13.3 % — SIGNIFICANT CHANGE UP (ref 10.3–14.5)
RBC CASTS # UR COMP ASSIST: 110 /HPF — HIGH (ref 0–4)
SODIUM SERPL-SCNC: 138 MMOL/L — SIGNIFICANT CHANGE UP (ref 135–145)
SP GR SPEC: 1.02 — SIGNIFICANT CHANGE UP (ref 1–1.03)
SQUAMOUS # UR AUTO: 2 /HPF — SIGNIFICANT CHANGE UP (ref 0–5)
UROBILINOGEN FLD QL: 1 MG/DL — SIGNIFICANT CHANGE UP (ref 0.2–1)
WBC # BLD: 6.84 K/UL — SIGNIFICANT CHANGE UP (ref 3.8–10.5)
WBC # FLD AUTO: 6.84 K/UL — SIGNIFICANT CHANGE UP (ref 3.8–10.5)
WBC UR QL: 2 /HPF — SIGNIFICANT CHANGE UP (ref 0–5)

## 2025-06-19 PROCEDURE — 36415 COLL VENOUS BLD VENIPUNCTURE: CPT

## 2025-06-19 PROCEDURE — 76830 TRANSVAGINAL US NON-OB: CPT

## 2025-06-19 PROCEDURE — 76830 TRANSVAGINAL US NON-OB: CPT | Mod: 26

## 2025-06-19 PROCEDURE — 87086 URINE CULTURE/COLONY COUNT: CPT

## 2025-06-19 PROCEDURE — 85730 THROMBOPLASTIN TIME PARTIAL: CPT

## 2025-06-19 PROCEDURE — 99284 EMERGENCY DEPT VISIT MOD MDM: CPT | Mod: 25

## 2025-06-19 PROCEDURE — 76856 US EXAM PELVIC COMPLETE: CPT

## 2025-06-19 PROCEDURE — 81001 URINALYSIS AUTO W/SCOPE: CPT

## 2025-06-19 PROCEDURE — 85025 COMPLETE CBC W/AUTO DIFF WBC: CPT

## 2025-06-19 PROCEDURE — 76856 US EXAM PELVIC COMPLETE: CPT | Mod: 26

## 2025-06-19 PROCEDURE — 99284 EMERGENCY DEPT VISIT MOD MDM: CPT

## 2025-06-19 PROCEDURE — 86901 BLOOD TYPING SEROLOGIC RH(D): CPT

## 2025-06-19 PROCEDURE — 80053 COMPREHEN METABOLIC PANEL: CPT

## 2025-06-19 PROCEDURE — 86850 RBC ANTIBODY SCREEN: CPT

## 2025-06-19 PROCEDURE — 86900 BLOOD TYPING SEROLOGIC ABO: CPT

## 2025-06-19 PROCEDURE — 85610 PROTHROMBIN TIME: CPT

## 2025-06-19 NOTE — ED PROVIDER NOTE - PHYSICAL EXAMINATION
Gen: well appearing, no acute distress  Head: normocephalic, atraumatic  EENT: EOMI, moist mucous membranes  Lung: no increased work of breathing, clear to auscultation bilaterally, speaking in full sentences without distress  CV: regular rate, regular rhythm, normal s1/s2  Abd: soft, (+)mild LLQ tenderness, non-distended,  no CVA tenderness  MSK: No edema, no visible deformities, full range of motion in all 4 extremities  Neuro: Awake, alert, clear speech, no facial asymmetry, moving all extremities against gravity Gen: well appearing, no acute distress  Head: normocephalic, atraumatic  EENT: EOMI, moist mucous membranes  Lung: no increased work of breathing, clear to auscultation bilaterally, speaking in full sentences without distress  CV: regular rate, regular rhythm, normal s1/s2  Abd: soft, (+)mild LLQ tenderness, non-distended,  no CVA tenderness  : performed with Dr. Hawley. external exam appears normal. vaginal walls no obvious laceration. has pooling of blood in posterior canal. able to visualize os which is closed however difficult to ascertain source of bleeding due to pooling. no clots visualized.   MSK: No edema, no visible deformities, full range of motion in all 4 extremities  Neuro: Awake, alert, clear speech, no facial asymmetry, moving all extremities against gravity

## 2025-06-19 NOTE — ED PROVIDER NOTE - ATTENDING CONTRIBUTION TO CARE
50y F w/ hx SLE, CAD s/p stent on Plavix, fibromyalgia, bipolar disease; presents for vaginal bleeding. Pt reports noting vaginal spotting 1 week ago that got heavier over the past few days. Reports associated pelvic discomfort. Has been changing pads few times a day. Says she is postmenopausal (LMP 3 years ago). Pt was concerned because she has history of vaginal bleeding 2/2 vaginal laceration that required repair. Denies any trauma. No flank pain or urinary symptoms. Notes that she has had sexual intercourse twice over the past week, but that bleeding began prior. Says she was previously told that she had "thickened lining" of her uterus as per her GYN Dr. Xie. On my exam pt in no acute distress, abdomen soft and nontender. On pelvic exam performed by resident Dr. Rivas, chaperoned by me, pt with blood noted in vaginal vault, no obvious lacerations noted. No emergent findings on labs or pelvic sono. Pt advised of need to f/u closely with GYN as outpatient. Stable for discharge.

## 2025-06-19 NOTE — ED ADULT TRIAGE NOTE - CHIEF COMPLAINT QUOTE
pt c/o vaginal spotting that began about 1 week ago, states she had intercourse and now is bleeding heavily.  states she had similar symptoms in the past and had a "tear" and needed blood transfusions.

## 2025-06-19 NOTE — ED PROVIDER NOTE - CLINICAL SUMMARY MEDICAL DECISION MAKING FREE TEXT BOX
50 year old female presenting with vaginal bleeding x 1 week, did have intercourse twice during this week and has hx vaginal laceration requiring OR for repair following intercourse July 2024. Also endorsing LLQ discomfort. Plan labs, pelvic sono. 50 year old female presenting with vaginal bleeding x 1 week, did have intercourse twice during this week and has hx vaginal laceration requiring OR for repair following intercourse July 2024. Also endorsing LLQ discomfort.  exam as documented. Plan labs, pelvic sono.    Progress:   labs nonactionable. pelvic sono no acute findings.   hemodynamically stable. patient comfortable with outpatient f/u with Dr. Xie, will call tomorrow to make appointment.   strict return precautions. stable for dc home

## 2025-06-19 NOTE — ED PROVIDER NOTE - PATIENT PORTAL LINK FT
You can access the FollowMyHealth Patient Portal offered by [facility] by registering at the following website: [email or web address]. By joining [facility]’s FollowMyHealth portal, you will also be able to view your health information using other applications (apps) compatible with our system.

## 2025-06-19 NOTE — ED ADULT NURSE NOTE - NSFALLUNIVINTERV_ED_ALL_ED
Bed/Stretcher in lowest position, wheels locked, appropriate side rails in place/Call bell, personal items and telephone in reach/Instruct patient to call for assistance before getting out of bed/chair/stretcher/Non-slip footwear applied when patient is off stretcher/[place] to call system/Physically safe environment - no spills, clutter or unnecessary equipment/Purposeful proactive rounding/Room/bathroom lighting operational, light cord in reach

## 2025-06-19 NOTE — ED PROVIDER NOTE - OBJECTIVE STATEMENT
50 year old female with PMHx SLE, CAD s/p stents on plavix, fibromyalgia, bipolar disorder, fibroids, pshx ectopic s/p salpingectomy, had vaginal laceration s/p intercourse requiring OR for repair July 2024,  presenting for evaluation of vaginal bleeding x 1 week. reports noted vaginal spotting prior to intercourse 1 week ago, states intercourse was not particularly traumatic, however spotting increased and now has vaginal bleeding, uses about 4 pads per day, did note a clot recently. States also had intercourse a few days ago. Reports has had associated discomfort to left lower quadrant during this time. denies any chest pain, shortness of breath, lightheadedness, dizziness.

## 2025-06-19 NOTE — ED ADULT TRIAGE NOTE - HEIGHT IN FEET
[facility]  Gastroenterology  [address] [place] [id number]  Phone: [phone]  Fax: [phone]  Follow Up Time: Routine

## 2025-06-19 NOTE — ED PROVIDER NOTE - NSFOLLOWUPINSTRUCTIONS_ED_ALL_ED_FT
- Follow up with Dr. Xie. Call tomorrow to make appointment.   - Bring all results of today's visit with you.     If you feel worse/have other concerning symptoms, please return immediately to the emergency department.

## 2025-06-21 LAB
CULTURE RESULTS: SIGNIFICANT CHANGE UP
SPECIMEN SOURCE: SIGNIFICANT CHANGE UP

## 2025-06-23 ENCOUNTER — APPOINTMENT (OUTPATIENT)
Dept: OBGYN | Facility: CLINIC | Age: 50
End: 2025-06-23

## 2025-06-23 ENCOUNTER — APPOINTMENT (OUTPATIENT)
Dept: CARDIOLOGY | Facility: CLINIC | Age: 50
End: 2025-06-23
Payer: MEDICARE

## 2025-06-23 PROCEDURE — 93880 EXTRACRANIAL BILAT STUDY: CPT

## 2025-06-25 ENCOUNTER — NON-APPOINTMENT (OUTPATIENT)
Age: 50
End: 2025-06-25

## 2025-07-10 NOTE — ED PROVIDER NOTE - CROS ED [facility] ALL NEG
Goal Outcome Evaluation:      Plan of Care Reviewed With: parent    Overall Patient Progress: no changeOverall Patient Progress: no change     [id number]: VSS, afebrile. No signs or symptoms of pain noted or observed. No PRNs given. LS clear on 1/2L NC. Pt continues to tolerate feeds of 160ml/hr with 30ml FWF after. Voiding and stooling. No contact from family this shift. Planning for discharge Saturday. Continue with POC.           negative...

## 2025-07-24 NOTE — ED STATDOCS - INPATIENT RESIDENT/ACP NOTIFIED DATE
Patient ID: Florence Swann is a 65 y.o. female.    Chief Complaint: General Illness (Entered automatically based on patient selection in [employer].)    The patient initiated a request through [employer] on 7/24/2025 for evaluation and management with a chief complaint of General Illness (Entered automatically based on patient selection in [employer].)     I evaluated the questionnaire submission on 07/24/2025.    [facility] Peq Evisit General    7/24/2025  2:42 PM CDT - Filed by Patient   Do you agree to participate in an E-Visit? Yes   If you have any of the following symptoms, please go to the nearest emergency room or call 911: I acknowledge   Choose the state of your primary residence [place]   What is the main issue you would like addressed today? Discomfort in my private area due to new soap product used in bathing   Please describe your symptoms. Irration, itching   Where is your problem located? Private area   On a scale of 1-10, where 10 is the worst you can imagine, how severe are your symptoms? (range: 1 - 10) 6   Have you had these symptoms before? Yes   How long have you been having these symptoms? (Just today, For a few days, For a week, For one to four weeks, For more than a month) A few days   What helps with your symptoms? Nothing as of yet   What makes your symptoms feel worse? Not sure   Are these symptoms related to a condition that you currently have? (Yes, No, Not sure) Yes   What condition do you currently have? Itching and irritation of private area   When were you last seen for this condition?    Provide any information you feel is important to your history not asked above I have sensitive skin to perfumes and apparently moisturizer in soap. The irritation i have is very sensitive and very discomfortable. I do not have access to a shower and can therefore only take baths.   Please attach any relevant images or files    Are you able to take your vitals? No         Encounter Diagnosis   Name Primary?     Vulvar dermatitis Yes        No orders of the defined types were placed in this encounter.     Medications Ordered This Encounter   Medications    hydrOXYzine pamoate (VISTARIL) 25 MG Cap     Sig: Take 1 capsule (25 mg total) by mouth every 8 (eight) hours as needed (itching).     Dispense:  30 capsule     Refill:  0    triamcinolone acetonide 0.1% (KENALOG) 0.1 % ointment     Sig: Apply topically 2 (two) times daily. for 14 days     Dispense:  30 g     Refill:  1      Avoid contact with irritant. I sent a prescription for a topical steroid cream (Triamcinolone) and an oral antihistamine (Vistaril- may cause drowsiness) to use as directed to help with your symptoms and the itching. Please schedule an in office visit for direct evaluation if no improvement. Notify M.D. or ER if symptoms persist or worsen, pain, bleeding, discharge, urinary symptoms, temp >100.4, or any acute illness.      You can prevent vulvar itching from some causes if you:  ?Use only water and unscented non-soap liquid cleanser (sample brand names: Cetaphil Gentle Skin Cleanser, Vanicream Liquid Cleanser) to wash your vulva.  ?Take baths in plain warm water, and do not use scented bath products.  ?Pat your vulva dry with a soft towel instead of rubbing it.  ?Wear cotton underwear, and avoid tight underwear or pants.  ?Do not use sprays or powders on your vulva.  ?Do not douche (put liquid inside your vagina to rinse it out).  ?Do not wipe with baby wipes or scented toilet paper after using the toilet.    Follow up if symptoms worsen or fail to improve.      E-Visit Time Tracking:    Day 1 Time (in minutes): 11    Total Time (in minutes): 11              11-Oct-2021 19:29

## 2025-07-28 ENCOUNTER — APPOINTMENT (OUTPATIENT)
Dept: OBGYN | Facility: CLINIC | Age: 50
End: 2025-07-28
Payer: MEDICARE

## 2025-07-28 VITALS
BODY MASS INDEX: 27.66 KG/M2 | DIASTOLIC BLOOD PRESSURE: 72 MMHG | SYSTOLIC BLOOD PRESSURE: 100 MMHG | WEIGHT: 162 LBS | HEIGHT: 64 IN

## 2025-07-28 DIAGNOSIS — Z87.42 PERSONAL HISTORY OF OTHER DISEASES OF THE FEMALE GENITAL TRACT: ICD-10-CM

## 2025-07-28 DIAGNOSIS — N95.0 POSTMENOPAUSAL BLEEDING: ICD-10-CM

## 2025-07-28 PROCEDURE — 58558Z: CUSTOM

## 2025-07-30 LAB — CORE LAB BIOPSY: NORMAL

## 2025-08-07 ENCOUNTER — NON-APPOINTMENT (OUTPATIENT)
Age: 50
End: 2025-08-07

## 2025-08-21 ENCOUNTER — APPOINTMENT (OUTPATIENT)
Dept: RHEUMATOLOGY | Facility: CLINIC | Age: 50
End: 2025-08-21
Payer: MEDICARE

## 2025-08-21 VITALS
SYSTOLIC BLOOD PRESSURE: 110 MMHG | HEIGHT: 64 IN | DIASTOLIC BLOOD PRESSURE: 60 MMHG | BODY MASS INDEX: 27.66 KG/M2 | WEIGHT: 162 LBS

## 2025-08-21 DIAGNOSIS — M79.7 FIBROMYALGIA: ICD-10-CM

## 2025-08-21 DIAGNOSIS — G56.03 CARPAL TUNNEL SYNDROM,BILATERAL UPPER LIMBS: ICD-10-CM

## 2025-08-21 DIAGNOSIS — R68.2 DRY MOUTH, UNSPECIFIED: ICD-10-CM

## 2025-08-21 DIAGNOSIS — L65.9 NONSCARRING HAIR LOSS, UNSPECIFIED: ICD-10-CM

## 2025-08-21 DIAGNOSIS — M13.0 POLYARTHRITIS, UNSPECIFIED: ICD-10-CM

## 2025-08-21 DIAGNOSIS — L93.2 OTHER LOCAL LUPUS ERYTHEMATOSUS: ICD-10-CM

## 2025-08-21 DIAGNOSIS — R52 PAIN, UNSPECIFIED: ICD-10-CM

## 2025-08-21 DIAGNOSIS — R53.83 OTHER FATIGUE: ICD-10-CM

## 2025-08-21 PROCEDURE — 99214 OFFICE O/P EST MOD 30 MIN: CPT

## 2025-08-21 PROCEDURE — G2211 COMPLEX E/M VISIT ADD ON: CPT

## 2025-08-21 RX ORDER — METHYLPRED ACET/NACL,ISO-OS/PF 40 MG/ML
40 VIAL (ML) INJECTION
Refills: 0 | Status: COMPLETED | OUTPATIENT
Start: 2025-08-21

## 2025-08-21 RX ADMIN — METHYLPREDNISOLONE ACETATE MG/ML: 40 INJECTION, SUSPENSION INTRA-ARTICULAR; INTRALESIONAL; INTRAMUSCULAR; SOFT TISSUE at 00:00

## 2025-08-26 ENCOUNTER — APPOINTMENT (OUTPATIENT)
Dept: OBGYN | Facility: CLINIC | Age: 50
End: 2025-08-26

## 2025-09-18 ENCOUNTER — APPOINTMENT (OUTPATIENT)
Dept: CARDIOLOGY | Facility: CLINIC | Age: 50
End: 2025-09-18

## (undated) DEVICE — WARMING BLANKET UPPER ADULT

## (undated) DEVICE — DRAPE 3/4 SHEET 52X76"

## (undated) DEVICE — SOL IRR BAG NS 0.9% 3000ML

## (undated) DEVICE — Device

## (undated) DEVICE — VENODYNE/SCD SLEEVE CALF MEDIUM

## (undated) DEVICE — PREP DYNA-HEX CHG 4% 4OZ BOTTLE (BACTOSHIELD)

## (undated) DEVICE — DRAPE LIGHT HANDLE COVER (GREEN)

## (undated) DEVICE — PREP TRAY DRY SKIN PREP SCRUB

## (undated) DEVICE — PACK LITHOTOMY

## (undated) DEVICE — LAP PAD W RING 18 X 18"

## (undated) DEVICE — DRAPE TOWEL BLUE 17" X 24"